# Patient Record
Sex: FEMALE | Race: OTHER | HISPANIC OR LATINO
[De-identification: names, ages, dates, MRNs, and addresses within clinical notes are randomized per-mention and may not be internally consistent; named-entity substitution may affect disease eponyms.]

---

## 2019-03-12 ENCOUNTER — APPOINTMENT (OUTPATIENT)
Dept: NEUROSURGERY | Facility: CLINIC | Age: 74
End: 2019-03-12

## 2019-04-22 ENCOUNTER — APPOINTMENT (OUTPATIENT)
Dept: MRI IMAGING | Facility: IMAGING CENTER | Age: 74
End: 2019-04-22

## 2019-04-23 ENCOUNTER — APPOINTMENT (OUTPATIENT)
Dept: NEUROSURGERY | Facility: CLINIC | Age: 74
End: 2019-04-23

## 2019-07-17 ENCOUNTER — OUTPATIENT (OUTPATIENT)
Dept: OUTPATIENT SERVICES | Facility: HOSPITAL | Age: 74
LOS: 1 days | End: 2019-07-17
Payer: MEDICARE

## 2019-07-17 ENCOUNTER — APPOINTMENT (OUTPATIENT)
Dept: MRI IMAGING | Facility: HOSPITAL | Age: 74
End: 2019-07-17

## 2019-07-17 DIAGNOSIS — G93.9 DISORDER OF BRAIN, UNSPECIFIED: ICD-10-CM

## 2019-07-17 DIAGNOSIS — D33.2 BENIGN NEOPLASM OF BRAIN, UNSPECIFIED: ICD-10-CM

## 2019-07-17 PROCEDURE — 70553 MRI BRAIN STEM W/O & W/DYE: CPT

## 2019-07-17 PROCEDURE — 70553 MRI BRAIN STEM W/O & W/DYE: CPT | Mod: 26

## 2019-07-17 PROCEDURE — A9585: CPT

## 2019-07-23 ENCOUNTER — APPOINTMENT (OUTPATIENT)
Dept: NEUROSURGERY | Facility: CLINIC | Age: 74
End: 2019-07-23
Payer: MEDICARE

## 2019-07-23 VITALS
OXYGEN SATURATION: 97 % | HEIGHT: 61 IN | SYSTOLIC BLOOD PRESSURE: 129 MMHG | WEIGHT: 125 LBS | DIASTOLIC BLOOD PRESSURE: 64 MMHG | HEART RATE: 80 BPM | RESPIRATION RATE: 16 BRPM | BODY MASS INDEX: 23.6 KG/M2 | TEMPERATURE: 98.1 F

## 2019-07-23 PROCEDURE — 99215 OFFICE O/P EST HI 40 MIN: CPT

## 2019-07-23 NOTE — PHYSICAL EXAM
[General Appearance - Alert] : alert [General Appearance - Well Developed] : well developed [General Appearance - In No Acute Distress] : in no acute distress [General Appearance - Well Nourished] : well nourished [General Appearance - Well-Appearing] : healthy appearing [Affect] : the affect was normal [Impaired Insight] : insight and judgment were intact [Oriented To Time, Place, And Person] : oriented to person, place, and time [Memory Recent] : recent memory was not impaired [Mood] : the mood was normal [Time] : oriented to time [Person] : oriented to person [Place] : oriented to place [Cranial Nerves Trigeminal (V)] : facial sensation intact symmetrically [Cranial Nerves Oculomotor (III)] : extraocular motion intact [Cranial Nerves Optic (II)] : visual acuity intact bilaterally,  pupils equal round and reactive to light [Cranial Nerves Facial (VII)] : face symmetrical [Cranial Nerves Vestibulocochlear (VIII)] : hearing was intact bilaterally [Cranial Nerves Glossopharyngeal (IX)] : tongue and palate midline [Cranial Nerves Accessory (XI - Cranial And Spinal)] : head turning and shoulder shrug symmetric [Cranial Nerves Hypoglossal (XII)] : there was no tongue deviation with protrusion [Motor Handedness Right-Handed] : the patient is right hand dominant [Motor Strength] : muscle strength was normal in all four extremities [Sclera] : the sclera and conjunctiva were normal [Extraocular Movements] : extraocular movements were intact [Outer Ear] : the ears and nose were normal in appearance [Hearing Threshold Finger Rub Not [place]] : hearing was normal [Oropharynx] : the oropharynx was normal [Respiration, Rhythm And Depth] : normal respiratory rhythm and effort [Neck Appearance] : the appearance of the neck was normal [Auscultation Breath Sounds / Voice Sounds] : lungs were clear to auscultation bilaterally [Exaggerated Use Of Accessory Muscles For Inspiration] : no accessory muscle use [Apical Impulse] : the apical impulse was normal [Heart Sounds] : normal S1 and S2 [Heart Rate And Rhythm] : heart rate was normal and rhythm regular [Involuntary Movements] : no involuntary movements were seen [Abnormal Walk] : normal gait [] : no rash [Skin Color & Pigmentation] : normal skin color and pigmentation [Motor Tone] : muscle strength and tone were normal [2+] : Ankle jerk left 2+ [Optic Disc Abnormality] : the optic disc were normal in size and color [FreeTextEntry1] : Left pupil is larger 4mm with Right pupil 3 mm.  Partial ptosis left eye. Mild diplopia on left lateral gaze with left eye. All findings seem unchanged form baseline. Vision 20/30 OD and 20/25 -3 OS,

## 2019-07-23 NOTE — DATA REVIEWED
[de-identified] : EXAM: MR BRAIN WAW IC \par \par \par PROCEDURE DATE: 07/17/2019 \par \par \par \par \par INTERPRETATION: CLINICAL INFORMATION: Left eye twitching and cramping. 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 \par \par TECHNIQUE: MRI of the brain was performed with and without contrast. \par Sagittal and axial T1, axial T2, axial T2 FLAIR, SWI, DWI, ADC map, axial, \par sagittal, and coronal T1 postcontrast images were obtained. \par \par 5.5 cc of Gadavist was injected, with 2.0 cc discarded. \par \par COMPARISON: CT brain 12/4/2013. \par \par FINDINGS: \par \par There is a 3.0 x 2.9 x 2.4 cm (AP by TR by CC) avidly enhancing, \par extra-axial, dural based mass in the left anterior middle cranial fossa, \par which invades into the left cavernous sinus. There is encasement of the left \par cavernous internal carotid artery, with marked narrowing of the vascular \par flow-voids. There is mass effect on the anterior medial temporal lobe with \par no vasogenic edema on the T2/FLAIR sequences. The mass extends to the left \par lateral aspect of the sella, and also extends into the suprasellar cistern. \par There is superior and medial deviation of the left optic nerve, optic \par chiasm, and left optic tract from mass effect. The mass abuts and may \par involve the left optic canal. \par \par There is mild prominence of ventricles and sulci compatible with age \par appropriate cerebral volume loss. There is no diffusion abnormality to \par suggest acute or subacute infarction. 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 A focus of blooming artifact in the left frontal lobe may \par be secondary to a calcification, cavernoma, or hemosiderin deposition from \par prior hemorrhage. Major flow-voids at the base of the brain follow expected \par course and contour. \par \par The calvarium is intact. The visualized intraorbital compartments, paranasal \par sinuses and tympanomastoid cavities appear free of acute disease. 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 \par \par \par EXAM: MR BRAIN WAW IC \par \par \par PROCEDURE DATE: 07/17/2019 \par \par \par \par \par INTERPRETATION: CLINICAL INFORMATION: Left eye twitching and cramping. 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 \par \par TECHNIQUE: MRI of the brain was performed with and without contrast. \par Sagittal and axial T1, axial T2, axial T2 FLAIR, SWI, DWI, ADC map, axial, \par sagittal, and coronal T1 postcontrast images were obtained. \par \par 5.5 cc of Gadavist was injected, with 2.0 cc discarded. \par \par COMPARISON: CT brain 12/4/2013. \par \par FINDINGS: \par \par There is a 3.0 x 2.9 x 2.4 cm (AP by TR by CC) avidly enhancing, \par extra-axial, dural based mass in the left anterior middle cranial fossa, \par which invades into the left cavernous sinus. There is encasement of the left \par cavernous internal carotid artery, with marked narrowing of the vascular \par flow-voids. There is mass effect on the anterior medial temporal lobe with \par no vasogenic edema on the T2/FLAIR sequences. The mass extends to the left \par lateral aspect of the sella, and also extends into the suprasellar cistern. \par There is superior and medial deviation of the left optic nerve, optic \par chiasm, and left optic tract from mass effect. The mass abuts and may \par involve the left optic canal. \par \par There is mild prominence of ventricles and sulci compatible with age \par appropriate cerebral volume loss. There is no diffusion abnormality to \par suggest acute or subacute infarction. 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 A focus of blooming artifact in the left frontal lobe may \par be secondary to a calcification, cavernoma, or hemosiderin deposition from \par prior hemorrhage. Major flow-voids at the base of the brain follow expected \par course and contour. \par \par The calvarium is intact. The visualized intraorbital compartments, paranasal \par sinuses and tympanomastoid cavities appear free of acute disease. 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 \par \par \par \par EXAM: MR BRAIN WAW IC \par \par \par PROCEDURE DATE: 07/17/2019 \par \par \par \par \par INTERPRETATION: CLINICAL INFORMATION: Left eye twitching and cramping. 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 \par \par TECHNIQUE: MRI of the brain was performed with and without contrast. \par Sagittal and axial T1, axial T2, axial T2 FLAIR, SWI, DWI, ADC map, axial, \par sagittal, and coronal T1 postcontrast images were obtained. \par \par 5.5 cc of Gadavist was injected, with 2.0 cc discarded. \par \par COMPARISON: CT brain 12/4/2013. \par \par FINDINGS: \par \par There is a 3.0 x 2.9 x 2.4 cm (AP by TR by CC) avidly enhancing, \par extra-axial, dural based mass in the left anterior middle cranial fossa, \par which invades into the left cavernous sinus. There is encasement of the left \par cavernous internal carotid artery, with marked narrowing of the vascular \par flow-voids. There is mass effect on the anterior medial temporal lobe with \par no vasogenic edema on the T2/FLAIR sequences. The mass extends to the left \par lateral aspect of the sella, and also extends into the suprasellar cistern. \par There is superior and medial deviation of the left optic nerve, optic \par chiasm, and left optic tract from mass effect. The mass abuts and may \par involve the left optic canal. \par \par There is mild prominence of ventricles and sulci compatible with age \par appropriate cerebral volume loss. There is no diffusion abnormality to \par suggest acute or subacute infarction. 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 A focus of blooming artifact in the left frontal lobe may \par be secondary to a calcification, cavernoma, or hemosiderin deposition from \par prior hemorrhage. Major flow-voids at the base of the brain follow expected \par course and contour. \par \par The calvarium is intact. The visualized intraorbital compartments, paranasal \par sinuses and tympanomastoid cavities appear free of acute disease. 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 \par \par \par \par \par \par \par \par ALLI WYNN M.D., RADIOLOGY RESIDENT \par This document has been electronically signed. \par SARAH SPICER M.D., ATTENDING RADIOLOGIST \par This document has been electronically signed. Jul 17 2019 4:22PM \par \par \par \par \par \par \par    \par  \par  \par \par \par Bookmarks \par   \par    \par \par  \par \par  \par \par  \par \par \par 1\par    \par \par \par 2\par    \par \par \par 3\par    \par \par

## 2019-07-23 NOTE — ASSESSMENT
[FreeTextEntry1] : IMPRESSION:\par Left medial sphenoid wing and cavernous sinus meningioma - being followed\par Exam seems unchanged with mild left ptosis and slight limitation of left eye abduction and upward gaze\par Left pupil slightly larger than right - as before\par \par \par PLAN:\par 1. F/U with neuro ophthalmologist Dr. Bella, or Dr. Smith.\par 2. Called Dr. Jiménez at [facility] to send me the previous MRIs dated  5/25/17 and 8/22/16 for comparison.\par 3. Will follow up with patient after Neuroradiologists have compared the studies.\par \par Parish Jackson MD, FACS, FAANS\par Professor and \par Department of Neurosurgery\par [facility]\par [address] [place] [address], [address] [place] [address]\[place] [address]\par [phone] Clinical\par [phone] Academic\par \par

## 2019-07-23 NOTE — HISTORY OF PRESENT ILLNESS
[FreeTextEntry1] : Zahraa Worthington is a pleasant right handed 73 year old lady from [place] who presented in 2016 for evaluation of an intracranial mass that was discovered during an eye exam bye her ophthalmologist Dr. Zaheer Bella.  She presented with chronic 3 year history of left eye ptosis and decreased vision with sensation of foreign body at times in her left eye. Symptoms occurred 2-3 times per month. Her ophthalmologist ordered a CT and then an MRI, which demonstrated a 3 cm mass involving the left cavernous sinus - likely a medial sphenoid wing and cavernous sinus meningioma.   Today she presents for a routine follow-up and on questioning reports occasional double vision in left eye when she looks to the left and upwards.  When looking to the right she does not experience the double vision.\par \par She gets regular headaches 4/10, not associated with other symptoms and  controlled with Tylenol prn.  She denies any other untoward symptoms.  Denies any seizures, recent falls or trauma, vertigo, nausea, vomiting, dysmetria or gait changes, or other sensorimotor deficits. \par \par Patient’s past medical history includes severe left facial trauma related to a fall down concrete basement stairs in 1997 and carbon monoxide poisoning in 2013 with residual deficits.

## 2019-08-20 ENCOUNTER — RX RENEWAL (OUTPATIENT)
Age: 74
End: 2019-08-20

## 2019-08-26 ENCOUNTER — NON-APPOINTMENT (OUTPATIENT)
Age: 74
End: 2019-08-26

## 2019-08-26 ENCOUNTER — APPOINTMENT (OUTPATIENT)
Dept: INTERNAL MEDICINE | Facility: CLINIC | Age: 74
End: 2019-08-26
Payer: MEDICARE

## 2019-08-26 VITALS
HEIGHT: 61 IN | SYSTOLIC BLOOD PRESSURE: 120 MMHG | BODY MASS INDEX: 21.9 KG/M2 | OXYGEN SATURATION: 98 % | WEIGHT: 116 LBS | HEART RATE: 69 BPM | TEMPERATURE: 98.4 F | DIASTOLIC BLOOD PRESSURE: 80 MMHG

## 2019-08-26 DIAGNOSIS — R92.2 INCONCLUSIVE MAMMOGRAM: ICD-10-CM

## 2019-08-26 DIAGNOSIS — R53.83 OTHER FATIGUE: ICD-10-CM

## 2019-08-26 DIAGNOSIS — R35.0 FREQUENCY OF MICTURITION: ICD-10-CM

## 2019-08-26 LAB
BILIRUB UR QL STRIP: NORMAL
CLARITY UR: CLEAR
COLLECTION METHOD: NORMAL
GLUCOSE UR-MCNC: NORMAL
HCG UR QL: 0.2 EU/DL
HGB UR QL STRIP.AUTO: NORMAL
KETONES UR-MCNC: NORMAL
LEUKOCYTE ESTERASE UR QL STRIP: ABNORMAL
NITRITE UR QL STRIP: NORMAL
PH UR STRIP: 5
PROT UR STRIP-MCNC: NORMAL
SP GR UR STRIP: 1

## 2019-08-26 PROCEDURE — 81003 URINALYSIS AUTO W/O SCOPE: CPT | Mod: QW

## 2019-08-26 PROCEDURE — G0444 DEPRESSION SCREEN ANNUAL: CPT | Mod: 59

## 2019-08-26 PROCEDURE — 36415 COLL VENOUS BLD VENIPUNCTURE: CPT

## 2019-08-26 PROCEDURE — G0439: CPT

## 2019-08-26 NOTE — HEALTH RISK ASSESSMENT
[Very Good] : ~his/her~  mood as very good [] : Yes [6-10] : 6-10 [Yes] : Yes [2 - 4 times a month (2 pts)] : 2-4 times a month (2 points) [Never (0 pts)] : Never (0 points) [1 or 2 (0 pts)] : 1 or 2 (0 points) [No] : In the past 12 months have you used drugs other than those required for medical reasons? No [No falls in past year] : Patient reported no falls in the past year [0] : 2) Feeling down, depressed, or hopeless: Not at all (0) [Patient reported mammogram was normal] : Patient reported mammogram was normal [Patient reported bone density results were abnormal] : Patient reported bone density results were abnormal [Patient reported PAP Smear was normal] : Patient reported PAP Smear was normal [HIV Test offered] : HIV Test offered [Patient reported colonoscopy was normal] : Patient reported colonoscopy was normal [Hepatitis C test offered] : Hepatitis C test offered [Audit-CScore] : 2 [de-identified] : reg [de-identified] : working in [employer] [MammogramDate] : 8/27/19 [PapSmearDate] : 8/7/2017 [BoneDensityDate] : 8/7.2018 [BoneDensityComments] : osteoporosis [ColonoscopyDate] : 2015

## 2019-08-27 LAB
25(OH)D3 SERPL-MCNC: 31.8 NG/ML
ALBUMIN SERPL ELPH-MCNC: 4.4 G/DL
ALP BLD-CCNC: 141 U/L
ALT SERPL-CCNC: 10 U/L
ANION GAP SERPL CALC-SCNC: 11 MMOL/L
AST SERPL-CCNC: 22 U/L
BASOPHILS # BLD AUTO: 0.06 K/UL
BASOPHILS NFR BLD AUTO: 0.7 %
BILIRUB SERPL-MCNC: 0.2 MG/DL
BUN SERPL-MCNC: 10 MG/DL
CALCIUM SERPL-MCNC: 9.5 MG/DL
CHLORIDE SERPL-SCNC: 105 MMOL/L
CHOLEST SERPL-MCNC: 198 MG/DL
CHOLEST/HDLC SERPL: 3.7 RATIO
CO2 SERPL-SCNC: 25 MMOL/L
CREAT SERPL-MCNC: 0.56 MG/DL
EOSINOPHIL # BLD AUTO: 0.19 K/UL
EOSINOPHIL NFR BLD AUTO: 2.3 %
ESTIMATED AVERAGE GLUCOSE: 114 MG/DL
GLUCOSE SERPL-MCNC: 91 MG/DL
HBA1C MFR BLD HPLC: 5.6 %
HCT VFR BLD CALC: 41.6 %
HDLC SERPL-MCNC: 53 MG/DL
HGB BLD-MCNC: 12.8 G/DL
IMM GRANULOCYTES NFR BLD AUTO: 0.2 %
LDLC SERPL CALC-MCNC: 122 MG/DL
LYMPHOCYTES # BLD AUTO: 2.46 K/UL
LYMPHOCYTES NFR BLD AUTO: 30 %
MAN DIFF?: NORMAL
MCHC RBC-ENTMCNC: 25.7 PG
MCHC RBC-ENTMCNC: 30.8 GM/DL
MCV RBC AUTO: 83.4 FL
MONOCYTES # BLD AUTO: 0.65 K/UL
MONOCYTES NFR BLD AUTO: 7.9 %
NEUTROPHILS # BLD AUTO: 4.82 K/UL
NEUTROPHILS NFR BLD AUTO: 58.9 %
PLATELET # BLD AUTO: 384 K/UL
POTASSIUM SERPL-SCNC: 4.7 MMOL/L
PROT SERPL-MCNC: 8.1 G/DL
RBC # BLD: 4.99 M/UL
RBC # FLD: 15.5 %
SODIUM SERPL-SCNC: 141 MMOL/L
TRIGL SERPL-MCNC: 117 MG/DL
TSH SERPL-ACNC: 1.52 UIU/ML
VIT B12 SERPL-MCNC: 701 PG/ML
WBC # FLD AUTO: 8.2 K/UL

## 2019-09-24 ENCOUNTER — APPOINTMENT (OUTPATIENT)
Dept: INTERNAL MEDICINE | Facility: CLINIC | Age: 74
End: 2019-09-24

## 2019-10-14 ENCOUNTER — LABORATORY RESULT (OUTPATIENT)
Age: 74
End: 2019-10-14

## 2019-10-14 ENCOUNTER — APPOINTMENT (OUTPATIENT)
Dept: INTERNAL MEDICINE | Facility: CLINIC | Age: 74
End: 2019-10-14
Payer: MEDICARE

## 2019-10-14 PROCEDURE — 36415 COLL VENOUS BLD VENIPUNCTURE: CPT

## 2019-10-16 LAB
ALBUMIN SERPL ELPH-MCNC: 4.2 G/DL
ALP BLD-CCNC: 132 U/L
ALT SERPL-CCNC: 9 U/L
ANION GAP SERPL CALC-SCNC: 11 MMOL/L
AST SERPL-CCNC: 18 U/L
BILIRUB SERPL-MCNC: 0.3 MG/DL
BUN SERPL-MCNC: 14 MG/DL
CALCIUM SERPL-MCNC: 9.4 MG/DL
CHLORIDE SERPL-SCNC: 106 MMOL/L
CO2 SERPL-SCNC: 25 MMOL/L
CREAT SERPL-MCNC: 0.6 MG/DL
GLUCOSE SERPL-MCNC: 99 MG/DL
POTASSIUM SERPL-SCNC: 4.5 MMOL/L
PROT SERPL-MCNC: 7.9 G/DL
SODIUM SERPL-SCNC: 142 MMOL/L

## 2020-09-28 ENCOUNTER — APPOINTMENT (OUTPATIENT)
Dept: GASTROENTEROLOGY | Facility: CLINIC | Age: 75
End: 2020-09-28
Payer: MEDICARE

## 2020-09-28 VITALS
DIASTOLIC BLOOD PRESSURE: 64 MMHG | BODY MASS INDEX: 22.84 KG/M2 | TEMPERATURE: 96.9 F | HEIGHT: 61 IN | SYSTOLIC BLOOD PRESSURE: 145 MMHG | HEART RATE: 72 BPM | WEIGHT: 121 LBS

## 2020-09-28 DIAGNOSIS — K59.09 OTHER CONSTIPATION: ICD-10-CM

## 2020-09-28 PROCEDURE — 99214 OFFICE O/P EST MOD 30 MIN: CPT

## 2020-09-28 NOTE — REVIEW OF SYSTEMS
[Joint Pain] : joint pain [Itching] : itching [Negative] : Heme/Lymph [As Noted in HPI] : as noted in HPI [Constipation] : constipation

## 2020-09-28 NOTE — HISTORY OF PRESENT ILLNESS
[Heartburn] : denies heartburn [Nausea] : denies nausea [Vomiting] : denies vomiting [Diarrhea] : denies diarrhea [Constipation] : stable constipation [Yellow Skin Or Eyes (Jaundice)] : denies jaundice [Abdominal Pain] : denies abdominal pain [Abdominal Swelling] : denies abdominal swelling [Rectal Pain] : denies rectal pain [_________] : Performed [unfilled] [de-identified] : Zahraa presents to the office today with her son for follow up.  She was last seen in the office in August 2015 when she underwent a colonoscopy with Dr. Shannon.\par \par She currently feels well from a GI perspective and denies any dysphagia, nausea, abdominal pain, change in bowel habits, GI bleeding, weight loss or family history of GI malignancies.  She has a tendency for constipation but reports that she uses Miralax if she goes more than 3 days without a BM.  On her colonoscopy in 2015, only hemorrhoids were seen.\par  [de-identified] : hemorrhoids

## 2020-10-18 ENCOUNTER — APPOINTMENT (OUTPATIENT)
Dept: INTERNAL MEDICINE | Facility: CLINIC | Age: 75
End: 2020-10-18

## 2020-11-08 ENCOUNTER — NON-APPOINTMENT (OUTPATIENT)
Age: 75
End: 2020-11-08

## 2020-11-08 ENCOUNTER — APPOINTMENT (OUTPATIENT)
Dept: FAMILY MEDICINE | Facility: CLINIC | Age: 75
End: 2020-11-08
Payer: MEDICARE

## 2020-11-08 VITALS
HEIGHT: 61 IN | BODY MASS INDEX: 23.6 KG/M2 | RESPIRATION RATE: 12 BRPM | DIASTOLIC BLOOD PRESSURE: 70 MMHG | TEMPERATURE: 98.5 F | HEART RATE: 74 BPM | SYSTOLIC BLOOD PRESSURE: 140 MMHG | WEIGHT: 125 LBS

## 2020-11-08 DIAGNOSIS — L30.9 DERMATITIS, UNSPECIFIED: ICD-10-CM

## 2020-11-08 DIAGNOSIS — Z23 ENCOUNTER FOR IMMUNIZATION: ICD-10-CM

## 2020-11-08 PROCEDURE — 36415 COLL VENOUS BLD VENIPUNCTURE: CPT

## 2020-11-08 PROCEDURE — G0439: CPT

## 2020-11-08 PROCEDURE — 90662 IIV NO PRSV INCREASED AG IM: CPT

## 2020-11-08 PROCEDURE — G0008: CPT

## 2020-11-08 PROCEDURE — 99072 ADDL SUPL MATRL&STAF TM PHE: CPT

## 2020-11-10 LAB
25(OH)D3 SERPL-MCNC: 29.7 NG/ML
ALBUMIN SERPL ELPH-MCNC: 4.2 G/DL
ALP BLD-CCNC: 125 U/L
ALT SERPL-CCNC: 14 U/L
ANION GAP SERPL CALC-SCNC: 11 MMOL/L
APPEARANCE: CLEAR
AST SERPL-CCNC: 21 U/L
BASOPHILS # BLD AUTO: 0.07 K/UL
BASOPHILS NFR BLD AUTO: 1 %
BILIRUB SERPL-MCNC: 0.3 MG/DL
BILIRUBIN URINE: NEGATIVE
BLOOD URINE: NEGATIVE
BUN SERPL-MCNC: 13 MG/DL
CALCIUM SERPL-MCNC: 8.9 MG/DL
CHLORIDE SERPL-SCNC: 107 MMOL/L
CHOLEST SERPL-MCNC: 171 MG/DL
CO2 SERPL-SCNC: 23 MMOL/L
COLOR: COLORLESS
CREAT SERPL-MCNC: 0.53 MG/DL
EOSINOPHIL # BLD AUTO: 0.34 K/UL
EOSINOPHIL NFR BLD AUTO: 4.9 %
ESTIMATED AVERAGE GLUCOSE: 120 MG/DL
GLUCOSE QUALITATIVE U: NEGATIVE
GLUCOSE SERPL-MCNC: 96 MG/DL
HBA1C MFR BLD HPLC: 5.8 %
HCT VFR BLD CALC: 40.2 %
HDLC SERPL-MCNC: 51 MG/DL
HGB BLD-MCNC: 12.5 G/DL
IMM GRANULOCYTES NFR BLD AUTO: 0.1 %
KETONES URINE: NEGATIVE
LDLC SERPL CALC-MCNC: 102 MG/DL
LEUKOCYTE ESTERASE URINE: NEGATIVE
LYMPHOCYTES # BLD AUTO: 2.08 K/UL
LYMPHOCYTES NFR BLD AUTO: 30.2 %
MAN DIFF?: NORMAL
MCHC RBC-ENTMCNC: 25.9 PG
MCHC RBC-ENTMCNC: 31.1 GM/DL
MCV RBC AUTO: 83.4 FL
MONOCYTES # BLD AUTO: 0.69 K/UL
MONOCYTES NFR BLD AUTO: 10 %
NEUTROPHILS # BLD AUTO: 3.7 K/UL
NEUTROPHILS NFR BLD AUTO: 53.8 %
NITRITE URINE: NEGATIVE
NONHDLC SERPL-MCNC: 121 MG/DL
PH URINE: 7
PLATELET # BLD AUTO: 354 K/UL
POTASSIUM SERPL-SCNC: 4.1 MMOL/L
PROT SERPL-MCNC: 7.4 G/DL
PROTEIN URINE: NEGATIVE
RBC # BLD: 4.82 M/UL
RBC # FLD: 15.2 %
SARS-COV-2 IGG SERPL IA-ACNC: 0.09 INDEX
SARS-COV-2 IGG SERPL QL IA: NEGATIVE
SODIUM SERPL-SCNC: 141 MMOL/L
SPECIFIC GRAVITY URINE: 1.01
TRIGL SERPL-MCNC: 92 MG/DL
TSH SERPL-ACNC: 1.36 UIU/ML
UROBILINOGEN URINE: NORMAL
VIT B12 SERPL-MCNC: 607 PG/ML
WBC # FLD AUTO: 6.89 K/UL

## 2020-11-16 ENCOUNTER — APPOINTMENT (OUTPATIENT)
Dept: VASCULAR SURGERY | Facility: CLINIC | Age: 75
End: 2020-11-16
Payer: MEDICARE

## 2020-11-16 VITALS
SYSTOLIC BLOOD PRESSURE: 125 MMHG | BODY MASS INDEX: 23.6 KG/M2 | HEIGHT: 61 IN | TEMPERATURE: 97.7 F | HEART RATE: 68 BPM | DIASTOLIC BLOOD PRESSURE: 72 MMHG | WEIGHT: 125 LBS

## 2020-11-16 PROCEDURE — 93970 EXTREMITY STUDY: CPT

## 2020-11-16 PROCEDURE — 99072 ADDL SUPL MATRL&STAF TM PHE: CPT

## 2020-11-16 PROCEDURE — 99203 OFFICE O/P NEW LOW 30 MIN: CPT

## 2020-11-16 NOTE — PHYSICAL EXAM
[de-identified] : On physical examination the patient is in no acute distress. The lungs are clear to auscultation and the heart has a regular rate and rhythm. The abdomen is benign. Bilateral femoral and pulses are palpable. Hyperpigmentation changes of both ankles and lateral legs and anterior left shin. Varicose veins of both legs. No wounds present. \par \par A lower extremity venous duplex was obtained in the office today and showed:\par -No evidence of DVT/SVT in either extremity\par -Right: reflux in the great saphenous vein and calf tributaries\par -Left: reflux in calf tributaries\par -Both SSV appear closed\par

## 2020-11-16 NOTE — ASSESSMENT
[FreeTextEntry1] : In summary, Mrs. Worthington presents with lower extremity superficial venous insufficiency. I have provided her a new prescription for knee-high, 20-30mmg Hg compression stockings and instructed her to wear these during the day and remove at night. She should exercise regularly and keep her legs elevated whenever possible. We will schedule her for right GSV radiofrequency ablation and bilateral stab phlebectomy at her convenience. Her skin hyperpigmentation is likely the result of prior sclerotherapy and is unlikely to resolve.\par \par Thank you for allowing me to participate in the care of this nice lady. Please do not hesitate to contact me with any questions. \par   \par

## 2020-11-16 NOTE — HISTORY OF PRESENT ILLNESS
[FreeTextEntry1] : I have just had the pleasure of seeing Mrs. Zahraa Worthington in consultation for lower extremity venous insufficiency.\par \par Mrs. Worthington is a pleasant 75-year-old lady who presents with a long history of lower extremity varicose veins. She underwent ultrasound-guided sclerotherapy at [facility] 1-2 years ago and has had skin hyperpigmentation since the procedure. She works as a hairdresser’s assistant and spends her days standing for long periods of time. She has been wearing compression stockings since her vein procedure. Her legs feel fatigued as the day progresses and her veins are pruritic. She denies any lower extremity edema or ulcers. She denies any history of DVT/SVT/PE or other thromboembolic disease. She denies any history of lower extremity claudication, rest pain, or tissue loss. \par \par She denies any history of CAD, MI, DM, CRI, CVA, or TIA.\par \par Her medical history is otherwise significant for meningioma and breast cyst\par \par Medications: Vitamins\par \par Allergies: PCN\par \par Social history: Non-smoker\par \par FH: NC\par

## 2020-11-23 ENCOUNTER — APPOINTMENT (OUTPATIENT)
Dept: RADIOLOGY | Facility: CLINIC | Age: 75
End: 2020-11-23
Payer: MEDICARE

## 2020-11-23 ENCOUNTER — OUTPATIENT (OUTPATIENT)
Dept: OUTPATIENT SERVICES | Facility: HOSPITAL | Age: 75
LOS: 1 days | End: 2020-11-23
Payer: MEDICARE

## 2020-11-23 ENCOUNTER — RESULT REVIEW (OUTPATIENT)
Age: 75
End: 2020-11-23

## 2020-11-23 DIAGNOSIS — D33.2 BENIGN NEOPLASM OF BRAIN, UNSPECIFIED: ICD-10-CM

## 2020-11-23 PROCEDURE — 77080 DXA BONE DENSITY AXIAL: CPT

## 2020-11-23 PROCEDURE — 77080 DXA BONE DENSITY AXIAL: CPT | Mod: 26

## 2021-01-04 ENCOUNTER — APPOINTMENT (OUTPATIENT)
Dept: VASCULAR SURGERY | Facility: CLINIC | Age: 76
End: 2021-01-04

## 2021-01-04 DIAGNOSIS — I83.93 ASYMPTOMATIC VARICOSE VEINS OF BILATERAL LOWER EXTREMITIES: ICD-10-CM

## 2021-01-05 LAB — SARS-COV-2 N GENE NPH QL NAA+PROBE: NOT DETECTED

## 2021-01-07 ENCOUNTER — APPOINTMENT (OUTPATIENT)
Dept: VASCULAR SURGERY | Facility: CLINIC | Age: 76
End: 2021-01-07
Payer: MEDICARE

## 2021-01-07 PROCEDURE — 99072 ADDL SUPL MATRL&STAF TM PHE: CPT

## 2021-01-07 PROCEDURE — 37765 STAB PHLEB VEINS XTR 10-20: CPT | Mod: LT

## 2021-01-07 NOTE — PROCEDURE
[FreeTextEntry1] : left stab phlebectomy [FreeTextEntry3] : Procedural safety checklist and time out completed:\par Confirmed patient identification (Patient Name, , and/or medical record number including when possible affirmation by patient or parent/family/other.\par Confirmed procedure with the patient. Consent present, accurate and signed. \par Confirmed special equipment and supplies are present.\par Sterility confirmed. Position verified. \par Site/ side is marked and visible and confirmed. \par Procedure confirmed by consent. Accurate consent including side and site.\par Review of medical records noting correct procedure including site and side.\par MD/PA verifies presence and review of imaging studies and or written report of imaging studies.\par Specify equipment are available for the planned procedure.\par MD/PA has marked the patient's procedural site and side.\par Agreement on the procedure to be performed\par Time out completed.\par All of the above has been confirmed by the team.\par All patient-specific concerns have been addressed. \par \par Indication: Left lower extremity varicose veins with inflammation, leg pain, leg swelling, and leg cramping.  COVID PCR negative.\par \par Procedure: Stab phlebectomy left lower extremity\par \par  Ms. KIM CONNELLY is a 75 year old F with a history of symptomatic left lower extremity varicose veins. A trial of compression stockings, exercise, elevation, and pain medication was attempted without relief and definitive treatment with microphlebectomy was offered. \par \par I have discussed the risks of the procedure at length with the patient. The risks discussed were inclusive of but not limited to infection, irritation at the site of infiltration of local anesthesia, and also rare risk of deep venous thrombosis and pulmonary emboli. The patient agrees to proceed with the procedure. \par The patient was escorted into the procedure room, the varicose veins for treatment were marked out and the patient placed on the examination table. The entire limb was prepped and draped in sterile fashion and a  time out was called. \par \par Local anesthesia using 15 cc 1% lidocaine was infiltrated using a 25 gauge needle over the previously marked prominent varicose vein sites.\par Multiple small stab incisions, each less than 1 cm in length was made at the noted sites. With the help of a vein hook, the vein was fished out at each of these sites, rolled over a narrow-tipped mosquito clamp and removed. Hemostasis was secured with leg elevation and application of manual pressure. After assuring hemostasis, a sterile 4x4 was placed on the access sites and an ACE compression wrap was applied. Estimated blood loss: minimal. Patient tolerated procedure well. Patient was given post-procedure instructions and follow up appointment was scheduled. \par \par SIDE -  LEFT	\par SITE - CALF / THIGH\par LOCATION - MEDIAL / LATERAL  / POSTERIOR	\par Total Stab Incisions 12\par \par

## 2021-02-02 ENCOUNTER — APPOINTMENT (OUTPATIENT)
Dept: VASCULAR SURGERY | Facility: CLINIC | Age: 76
End: 2021-02-02

## 2021-02-03 LAB — SARS-COV-2 N GENE NPH QL NAA+PROBE: NOT DETECTED

## 2021-02-04 ENCOUNTER — APPOINTMENT (OUTPATIENT)
Dept: VASCULAR SURGERY | Facility: CLINIC | Age: 76
End: 2021-02-04
Payer: MEDICARE

## 2021-02-04 PROCEDURE — 99072 ADDL SUPL MATRL&STAF TM PHE: CPT

## 2021-02-04 PROCEDURE — 36475 ENDOVENOUS RF 1ST VEIN: CPT | Mod: RT

## 2021-02-04 RX ORDER — LIDOCAINE HYDROCHLORIDE 10 MG/ML
1 INJECTION, SOLUTION INFILTRATION; PERINEURAL
Qty: 1 | Refills: 0 | Status: COMPLETED | COMMUNITY
Start: 2021-01-04 | End: 2021-02-04

## 2021-02-04 RX ORDER — SODIUM CHLORIDE 9 G/ML
0.9 INJECTION, SOLUTION INTRAVENOUS
Qty: 1 | Refills: 0 | Status: COMPLETED | COMMUNITY
Start: 2021-01-29 | End: 2021-02-04

## 2021-02-04 RX ORDER — SODIUM BICARBONATE 84 MG/ML
8.4 INJECTION, SOLUTION INTRAVENOUS
Qty: 1 | Refills: 0 | Status: COMPLETED | COMMUNITY
Start: 2021-01-04 | End: 2021-02-04

## 2021-02-04 RX ORDER — SODIUM CHLORIDE 9 G/ML
0.9 INJECTION, SOLUTION INTRAVENOUS
Qty: 1 | Refills: 0 | Status: COMPLETED | COMMUNITY
Start: 2021-01-04 | End: 2021-02-04

## 2021-02-04 RX ORDER — SODIUM BICARBONATE 84 MG/ML
8.4 INJECTION, SOLUTION INTRAVENOUS
Qty: 1 | Refills: 0 | Status: COMPLETED | COMMUNITY
Start: 2021-01-29 | End: 2021-02-04

## 2021-02-04 RX ORDER — LIDOCAINE HYDROCHLORIDE 10 MG/ML
1 INJECTION, SOLUTION INFILTRATION; PERINEURAL
Qty: 1 | Refills: 0 | Status: COMPLETED | COMMUNITY
Start: 2021-01-29 | End: 2021-02-04

## 2021-02-04 NOTE — PROCEDURE
[FreeTextEntry1] : right GSV RFA [FreeTextEntry3] : Procedural safety checklist and time out completed:\par Confirmed patient identification (Patient Name, , and/or medical record number including when possible affirmation by patient or parent/family/other).\par Confirmed procedure with the patient. Consent present, accurate and signed. \par Confirmed special equipment and supplies are present.\par Sterility confirmed. Position verified. \par Site/ side is marked and visible and confirmed. \par Procedure confirmed by consent. Accurate consent including side and site.\par Review of medical records, including venous ultrasound, noting correct procedure including site and side.\par MD/PA verifies presence and review of imaging studies and or written report of imaging studies.\par Agreement on the procedure to be performed\par Time out completed.\par All of the above has been confirmed by the team.\par All patient-specific concerns have been addressed. \par \par Indication: Right lower extremity varicose veins with inflammation, leg pain, leg swelling, and leg cramping.  Venous insufficiency/ reflux.\par \par Procedure: radiofrequency ablation of the right saphenous vein. \par 	\par Ms. KIM CONNELLY is a 75 year old F with a history of right lower extremity varicose veins previously seen in the office.  Ultrasound examination demonstrated venous insufficiency. A trial of compression stockings, exercise, elevation, and pain medication was attempted without relief and definitive treatment with radiofrequency ablation was offered. \par \par The patient has come for radiofrequency ablation treatment of the right great saphenous vein. Pre-procedure COVID PCR test was negative.\par I have discussed the risks of the procedure at length with the patient. The risks discussed were inclusive of but not limited to infection, irritation at the site of infiltration of local anesthesia, and also rare risk of deep venous thrombosis and pulmonary emboli. The patient agrees to proceed with the procedure. \par The patient was escorted into the procedure room and a time out called.\par The entire limb was prepped and draped in sterile fashion. The RF fiber was placed on the sterile field and connected by a sterile cable. Actuation, temperature, and impedance testing were performed to ensure that all components were connected and operating properly. \par The patient was placed on the procedure table and local anesthesia was instilled in the skin overlying the access site. Under ultrasound guidance, the vein was punctured with a micropuncture needle, using the anterior wall technique. A guide wire was now introduced through the needle, and the needle was then exchanged over the guide wire for a 7F sheath. The guide wire was removed and the RF probe was then placed into the right great saphenous vein through the sheath and position confirmed using ultrasound guidance. After the RF probe position was verified by ultrasound, tumescent anesthesia consisting of normal saline, 1% lidocaine with 8.4% sodium bicarbonate was infiltrated, under ultrasound guidance, precisely into the perivenous compartment along the entire length of the vein until a “halo” of fluid was noted around the vein. After RF probe position was again confirmed with ultrasound imaging, RF energy was applied. The probe was gradually and carefully withdrawn at a rate of 6.5cm/20seconds. \par \par 6 cycles of RF performed using the 7 cm probe\par Total treatment time was 120 seconds.\par The total volume injected was 300  cc\par Treatment length was 33 cm and \par The probe is 3.7 cm from the SFJ.\par \par Estimated Blood Loss: minimal\par Repeat ultrasound of the treated vein was performed confirming successful treatment. The catheter and sheath were withdrawn and hemostasis established with direct pressure. After assuring hemostasis, a sterile 4x4 was placed on the access site and an ACE compression wrap was applied. Patient tolerated procedure well. Patient was given post-procedure instructions and follow up appointment was scheduled.\par \par \par

## 2021-02-08 ENCOUNTER — APPOINTMENT (OUTPATIENT)
Dept: VASCULAR SURGERY | Facility: CLINIC | Age: 76
End: 2021-02-08
Payer: MEDICARE

## 2021-02-08 PROCEDURE — 93923 UPR/LXTR ART STDY 3+ LVLS: CPT

## 2021-02-08 PROCEDURE — 99072 ADDL SUPL MATRL&STAF TM PHE: CPT

## 2021-03-01 ENCOUNTER — APPOINTMENT (OUTPATIENT)
Dept: VASCULAR SURGERY | Facility: CLINIC | Age: 76
End: 2021-03-01

## 2021-03-01 ENCOUNTER — LABORATORY RESULT (OUTPATIENT)
Age: 76
End: 2021-03-01

## 2021-03-01 DIAGNOSIS — I83.892 VARICOSE VEINS OF LEFT LOWER EXTREMITY WITH OTHER COMPLICATIONS: ICD-10-CM

## 2021-03-04 ENCOUNTER — APPOINTMENT (OUTPATIENT)
Dept: VASCULAR SURGERY | Facility: CLINIC | Age: 76
End: 2021-03-04
Payer: MEDICARE

## 2021-03-04 DIAGNOSIS — I83.891 VARICOSE VEINS OF RIGHT LOWER EXTREMITY WITH OTHER COMPLICATIONS: ICD-10-CM

## 2021-03-04 PROCEDURE — 37765 STAB PHLEB VEINS XTR 10-20: CPT | Mod: RT

## 2021-03-04 PROCEDURE — 99072 ADDL SUPL MATRL&STAF TM PHE: CPT

## 2021-03-04 RX ORDER — SODIUM CHLORIDE 9 G/ML
0.9 INJECTION, SOLUTION INTRAVENOUS
Qty: 1 | Refills: 0 | Status: COMPLETED | COMMUNITY
Start: 2021-03-01 | End: 2021-03-04

## 2021-03-04 RX ORDER — SODIUM BICARBONATE 84 MG/ML
8.4 INJECTION, SOLUTION INTRAVENOUS
Qty: 1 | Refills: 0 | Status: COMPLETED | COMMUNITY
Start: 2021-03-01 | End: 2021-03-04

## 2021-03-04 RX ORDER — LIDOCAINE HYDROCHLORIDE 10 MG/ML
1 INJECTION, SOLUTION INFILTRATION; PERINEURAL
Qty: 1 | Refills: 0 | Status: COMPLETED | COMMUNITY
Start: 2021-03-01 | End: 2021-03-04

## 2021-03-04 NOTE — PROCEDURE
[FreeTextEntry1] : right stab phlebectomy [FreeTextEntry3] : Procedural safety checklist and time out completed:\par Confirmed patient identification (Patient Name, , and/or medical record number including when possible affirmation by patient or parent/family/other.\par Confirmed procedure with the patient. Consent present, accurate and signed. \par Confirmed special equipment and supplies are present.\par Sterility confirmed. Position verified. \par Site/ side is marked and visible and confirmed. \par Procedure confirmed by consent. Accurate consent including side and site.\par Review of medical records noting correct procedure including site and side.\par MD/PA verifies presence and review of imaging studies and or written report of imaging studies.\par Specify equipment are available for the planned procedure.\par MD/PA has marked the patient's procedural site and side.\par Agreement on the procedure to be performed\par Time out completed.\par All of the above has been confirmed by the team.\par All patient-specific concerns have been addressed. \par \par Indication:  Right lower extremity varicose veins with inflammation, leg pain, leg swelling, and leg cramping.  COVID PCR negative.\par \par Procedure: Stab phlebectomy right  lower extremity\par \par  Ms. KIM CONNELLY is a 75 year old F with a history of symptomatic right lower extremity varicose veins. A trial of compression stockings, exercise, elevation, and pain medication was attempted without relief and definitive treatment with microphlebectomy was offered. \par \par I have discussed the risks of the procedure at length with the patient. The risks discussed were inclusive of but not limited to infection, irritation at the site of infiltration of local anesthesia, and also rare risk of deep venous thrombosis and pulmonary emboli. The patient agrees to proceed with the procedure. \par The patient was escorted into the procedure room, the varicose veins for treatment were marked out and the patient placed on the examination table. The entire limb was prepped and draped in sterile fashion and a  time out was called. \par \par Local anesthesia using __ cc 1% lidocaine was infiltrated using a 25 gauge needle over the previously marked prominent varicose vein sites.\par Multiple small stab incisions, each less than 1 cm in length was made at the noted sites. With the help of a vein hook, the vein was fished out at each of these sites, rolled over a narrow-tipped mosquito clamp and removed. Hemostasis was secured with leg elevation and application of manual pressure. After assuring hemostasis, a sterile 4x4 was placed on the access sites and an ACE compression wrap was applied. Estimated blood loss: minimal. Patient tolerated procedure well. Patient was given post-procedure instructions and follow up appointment was scheduled. \par \par SIDE - RIGHT \par SITE - CALF \par LOCATION - LATERAL / POSTERIOR	\par Total Stab Incisions 10\par \par

## 2021-04-05 ENCOUNTER — APPOINTMENT (OUTPATIENT)
Dept: VASCULAR SURGERY | Facility: CLINIC | Age: 76
End: 2021-04-05
Payer: MEDICARE

## 2021-04-05 VITALS
TEMPERATURE: 97.5 F | SYSTOLIC BLOOD PRESSURE: 119 MMHG | WEIGHT: 125 LBS | HEIGHT: 61 IN | HEART RATE: 79 BPM | DIASTOLIC BLOOD PRESSURE: 73 MMHG | BODY MASS INDEX: 23.6 KG/M2

## 2021-04-05 PROCEDURE — 99213 OFFICE O/P EST LOW 20 MIN: CPT

## 2021-04-05 PROCEDURE — 99072 ADDL SUPL MATRL&STAF TM PHE: CPT

## 2021-04-05 NOTE — HISTORY OF PRESENT ILLNESS
[FreeTextEntry1] : I have just had the pleasure of seeing Mrs. Zahraa Worthington in follow up for lower extremity venous insufficiency.\par \par Mrs. Worthington is a pleasant 75-year-old lady who presented with a long history of lower extremity varicose veins. She underwent ultrasound-guided sclerotherapy at [facility] 1-2 years ago and has had skin hyperpigmentation since the procedure. She works as a hairdresser’s assistant and spends her days standing for long periods of time. She has been wearing compression stockings since her vein procedure. Her legs feel fatigued as the day progresses and her veins are pruritic. She denies any lower extremity edema or ulcers. She denies any history of DVT/SVT/PE or other thromboembolic disease. She denies any history of lower extremity claudication, rest pain, or tissue loss. \par \par Since her initial consultation, Mrs. Worthington has undergone left leg stab phlebectomy (1/7/21), right GSV RFA (2/4/21) and right leg stab phlebectomy (3/4/21). Her post-RFA duplex showed that the right GSV was closed and there was no evidence of DVT. Her stab incisions have healed well and there is no erythema, induration, fluctuance, or drainage. She is wearing compression stockings as instructed and she is without complaints. \par \par She denies any history of CAD, MI, DM, CRI, CVA, or TIA.\par \par Her medical history is otherwise significant for meningioma and breast cyst\par \par Medications: Vitamins\par \par Allergies: PCN\par \par Social history: Non-smoker\par \par FH: NC\par

## 2021-04-05 NOTE — ASSESSMENT
[FreeTextEntry1] : I instructed Mrs. Worthington to continue to wear compression stockings as instructed. She will follow up as needed.

## 2021-04-05 NOTE — PHYSICAL EXAM
[de-identified] : On physical examination the patient is in no acute distress. The lungs are clear to auscultation and the heart has a regular rate and rhythm. The abdomen is benign. Bilateral femoral and pulses are palpable. Hyperpigmentation changes of both ankles and lateral legs and anterior left shin. Well healed stab incisions of both lower extremities. No edema or wounds present. \par \par

## 2021-12-07 ENCOUNTER — APPOINTMENT (OUTPATIENT)
Dept: FAMILY MEDICINE | Facility: CLINIC | Age: 76
End: 2021-12-07
Payer: MEDICARE

## 2021-12-07 ENCOUNTER — LABORATORY RESULT (OUTPATIENT)
Age: 76
End: 2021-12-07

## 2021-12-07 VITALS
SYSTOLIC BLOOD PRESSURE: 124 MMHG | OXYGEN SATURATION: 98 % | TEMPERATURE: 97.9 F | HEIGHT: 61 IN | DIASTOLIC BLOOD PRESSURE: 70 MMHG | HEART RATE: 81 BPM | WEIGHT: 120 LBS | BODY MASS INDEX: 22.66 KG/M2

## 2021-12-07 DIAGNOSIS — R74.8 ABNORMAL LEVELS OF OTHER SERUM ENZYMES: ICD-10-CM

## 2021-12-07 DIAGNOSIS — L25.9 UNSPECIFIED CONTACT DERMATITIS, UNSPECIFIED CAUSE: ICD-10-CM

## 2021-12-07 PROCEDURE — G0439: CPT

## 2021-12-07 PROCEDURE — G0442 ANNUAL ALCOHOL SCREEN 15 MIN: CPT

## 2021-12-07 PROCEDURE — G0444 DEPRESSION SCREEN ANNUAL: CPT

## 2021-12-07 PROCEDURE — 36415 COLL VENOUS BLD VENIPUNCTURE: CPT

## 2021-12-07 PROCEDURE — 93000 ELECTROCARDIOGRAM COMPLETE: CPT | Mod: 59

## 2021-12-07 NOTE — HISTORY OF PRESENT ILLNESS
[FreeTextEntry1] : annual wellness visit [de-identified] : 75 yo female presents for annual physical. She reports intermittent lower back pain. She had a varicose vein procedure this summer, and reports after the surgery she developed an itchy dark rash on the back of the right knee where the procedure was done, unresolved . She has tried a Lotrimin spray and hydrocortisone cream with no significant relief. \par Has history of osteoporosis,  started Prolia last year, had 2 doses, but became too expensive\par States daughter just moved to [place]. 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 \par

## 2021-12-07 NOTE — PHYSICAL EXAM
[Normal] : normal gait, coordination grossly intact, no focal deficits and deep tendon reflexes were 2+ and symmetric [de-identified] : dark erythematous rash on right popliteal region

## 2021-12-07 NOTE — HEALTH RISK ASSESSMENT
[Very Good] : ~his/her~  mood as very good [] : Yes [Yes] : Yes [2 - 4 times a month (2 pts)] : 2-4 times a month (2 points) [1 or 2 (0 pts)] : 1 or 2 (0 points) [Never (0 pts)] : Never (0 points) [No] : In the past 12 months have you used drugs other than those required for medical reasons? No [No falls in past year] : Patient reported no falls in the past year [0] : 2) Feeling down, depressed, or hopeless: Not at all (0) [PHQ-2 Negative - No further assessment needed] : PHQ-2 Negative - No further assessment needed [Patient reported mammogram was normal] : Patient reported mammogram was normal [Patient reported PAP Smear was normal] : Patient reported PAP Smear was normal [Patient reported bone density results were abnormal] : Patient reported bone density results were abnormal [Patient reported colonoscopy was normal] : Patient reported colonoscopy was normal [HIV Test offered] : HIV Test offered [Hepatitis C test offered] : Hepatitis C test offered [None] : None [With Family] : lives with family [Employed] : employed [Single] : single [Feels Safe at Home] : Feels safe at home [Fully functional (bathing, dressing, toileting, transferring, walking, feeding)] : Fully functional (bathing, dressing, toileting, transferring, walking, feeding) [Fully functional (using the telephone, shopping, preparing meals, housekeeping, doing laundry, using] : Fully functional and needs no help or supervision to perform IADLs (using the telephone, shopping, preparing meals, housekeeping, doing laundry, using transportation, managing medications and managing finances) [Smoke Detector] : smoke detector [Carbon Monoxide Detector] : carbon monoxide detector [YearQuit] : 1983 [Audit-CScore] : 2 [de-identified] : working and walking in [employer] [de-identified] : reg, veggies, protein, fish, less red meat now [HFW8Chtsi] : 0 [Change in mental status noted] : No change in mental status noted [Language] : denies difficulty with language [Behavior] : denies difficulty with behavior [Reasoning] : denies difficulty with reasoning [Sexually Active] : not sexually active [Reports changes in hearing] : Reports no changes in hearing [Reports changes in vision] : Reports no changes in vision [Reports changes in dental health] : Reports no changes in dental health [MammogramDate] : 10/26/21 [MammogramComments] : BIRADS 2 [PapSmearDate] : 8/7/2017 [BoneDensityDate] : 11/23/2020 [BoneDensityComments] : osteoporosis [ColonoscopyDate] : 2019

## 2021-12-09 LAB
25(OH)D3 SERPL-MCNC: 35.4 NG/ML
ALBUMIN SERPL ELPH-MCNC: 4.3 G/DL
ALP BLD-CCNC: 123 U/L
ALT SERPL-CCNC: 11 U/L
ANION GAP SERPL CALC-SCNC: 10 MMOL/L
APPEARANCE: CLEAR
AST SERPL-CCNC: 20 U/L
BASOPHILS # BLD AUTO: 0.04 K/UL
BASOPHILS NFR BLD AUTO: 0.6 %
BILIRUB SERPL-MCNC: <0.2 MG/DL
BILIRUBIN URINE: NEGATIVE
BLOOD URINE: NEGATIVE
BUN SERPL-MCNC: 10 MG/DL
CALCIUM SERPL-MCNC: 9.4 MG/DL
CHLORIDE SERPL-SCNC: 102 MMOL/L
CHOLEST SERPL-MCNC: 200 MG/DL
CO2 SERPL-SCNC: 26 MMOL/L
COLOR: COLORLESS
CREAT SERPL-MCNC: 0.6 MG/DL
EOSINOPHIL # BLD AUTO: 0.12 K/UL
EOSINOPHIL NFR BLD AUTO: 1.9 %
ESTIMATED AVERAGE GLUCOSE: 120 MG/DL
GLUCOSE QUALITATIVE U: NEGATIVE
GLUCOSE SERPL-MCNC: 93 MG/DL
HBA1C MFR BLD HPLC: 5.8 %
HCT VFR BLD CALC: 40 %
HDLC SERPL-MCNC: 56 MG/DL
HGB BLD-MCNC: 12.8 G/DL
IMM GRANULOCYTES NFR BLD AUTO: 0.2 %
KETONES URINE: NEGATIVE
LDLC SERPL CALC-MCNC: 130 MG/DL
LEUKOCYTE ESTERASE URINE: ABNORMAL
LYMPHOCYTES # BLD AUTO: 1.97 K/UL
LYMPHOCYTES NFR BLD AUTO: 31.8 %
MAN DIFF?: NORMAL
MCHC RBC-ENTMCNC: 26 PG
MCHC RBC-ENTMCNC: 32 GM/DL
MCV RBC AUTO: 81.3 FL
MONOCYTES # BLD AUTO: 0.93 K/UL
MONOCYTES NFR BLD AUTO: 15 %
NEUTROPHILS # BLD AUTO: 3.13 K/UL
NEUTROPHILS NFR BLD AUTO: 50.5 %
NITRITE URINE: NEGATIVE
NONHDLC SERPL-MCNC: 143 MG/DL
PH URINE: 6
PLATELET # BLD AUTO: 330 K/UL
POTASSIUM SERPL-SCNC: 4 MMOL/L
PROT SERPL-MCNC: 7.7 G/DL
PROTEIN URINE: NEGATIVE
RBC # BLD: 4.92 M/UL
RBC # FLD: 15.4 %
SODIUM SERPL-SCNC: 138 MMOL/L
SPECIFIC GRAVITY URINE: 1.01
TRIGL SERPL-MCNC: 67 MG/DL
TSH SERPL-ACNC: 1.35 UIU/ML
UROBILINOGEN URINE: NORMAL
VIT B12 SERPL-MCNC: 565 PG/ML
WBC # FLD AUTO: 6.2 K/UL

## 2021-12-29 ENCOUNTER — EMERGENCY (EMERGENCY)
Facility: HOSPITAL | Age: 76
LOS: 0 days | Discharge: HOME | End: 2021-12-30
Attending: STUDENT IN AN ORGANIZED HEALTH CARE EDUCATION/TRAINING PROGRAM
Payer: MEDICARE

## 2021-12-29 VITALS
SYSTOLIC BLOOD PRESSURE: 139 MMHG | DIASTOLIC BLOOD PRESSURE: 68 MMHG | HEART RATE: 66 BPM | OXYGEN SATURATION: 98 % | TEMPERATURE: 98 F | RESPIRATION RATE: 16 BRPM

## 2021-12-29 VITALS
SYSTOLIC BLOOD PRESSURE: 158 MMHG | TEMPERATURE: 98 F | HEART RATE: 70 BPM | OXYGEN SATURATION: 98 % | WEIGHT: 119.93 LBS | DIASTOLIC BLOOD PRESSURE: 77 MMHG | HEIGHT: 61 IN | RESPIRATION RATE: 16 BRPM
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
## 2021-12-29 DIAGNOSIS — R07.9 CHEST PAIN, UNSPECIFIED: ICD-10-CM

## 2021-12-29 DIAGNOSIS — M54.9 DORSALGIA, UNSPECIFIED: ICD-10-CM

## 2021-12-29 DIAGNOSIS — Z88.0 ALLERGY STATUS TO PENICILLIN: ICD-10-CM

## 2021-12-29 PROCEDURE — 93010 ELECTROCARDIOGRAM REPORT: CPT

## 2021-12-29 PROCEDURE — 71045 X-RAY EXAM CHEST 1 VIEW: CPT | Mod: 26

## 2021-12-29 PROCEDURE — 99285 EMERGENCY DEPT VISIT HI MDM: CPT

## 2021-12-30 LAB
ALBUMIN SERPL ELPH-MCNC: 3.3 G/DL — SIGNIFICANT CHANGE UP (ref 3.3–5)
ALP SERPL-CCNC: 123 U/L — HIGH (ref 40–120)
ALT FLD-CCNC: 19 U/L — SIGNIFICANT CHANGE UP (ref 12–78)
ANION GAP SERPL CALC-SCNC: 5 MMOL/L — SIGNIFICANT CHANGE UP (ref 5–17)
AST SERPL-CCNC: 19 U/L — SIGNIFICANT CHANGE UP (ref 15–37)
BILIRUB SERPL-MCNC: 0.2 MG/DL — SIGNIFICANT CHANGE UP (ref 0.2–1.2)
BUN SERPL-MCNC: 14 MG/DL — SIGNIFICANT CHANGE UP (ref 7–23)
CALCIUM SERPL-MCNC: 8.7 MG/DL — SIGNIFICANT CHANGE UP (ref 8.5–10.1)
CHLORIDE SERPL-SCNC: 110 MMOL/L — HIGH (ref 96–108)
CO2 SERPL-SCNC: 27 MMOL/L — SIGNIFICANT CHANGE UP (ref 22–31)
CREAT SERPL-MCNC: 0.49 MG/DL — LOW (ref 0.5–1.3)
GLUCOSE SERPL-MCNC: 100 MG/DL — HIGH (ref 70–99)
HCT VFR BLD CALC: 37.9 % — SIGNIFICANT CHANGE UP (ref 34.5–45)
HGB BLD-MCNC: 12.3 G/DL — SIGNIFICANT CHANGE UP (ref 11.5–15.5)
MCHC RBC-ENTMCNC: 25.6 PG — LOW (ref 27–34)
MCHC RBC-ENTMCNC: 32.5 GM/DL — SIGNIFICANT CHANGE UP (ref 32–36)
MCV RBC AUTO: 79 FL — LOW (ref 80–100)
NRBC # BLD: 0 /100 WBCS — SIGNIFICANT CHANGE UP (ref 0–0)
PLATELET # BLD AUTO: 322 K/UL — SIGNIFICANT CHANGE UP (ref 150–400)
POTASSIUM SERPL-MCNC: 3.5 MMOL/L — SIGNIFICANT CHANGE UP (ref 3.5–5.3)
POTASSIUM SERPL-SCNC: 3.5 MMOL/L — SIGNIFICANT CHANGE UP (ref 3.5–5.3)
PROT SERPL-MCNC: 8 GM/DL — SIGNIFICANT CHANGE UP (ref 6–8.3)
RBC # BLD: 4.8 M/UL — SIGNIFICANT CHANGE UP (ref 3.8–5.2)
RBC # FLD: 15.3 % — HIGH (ref 10.3–14.5)
SODIUM SERPL-SCNC: 142 MMOL/L — SIGNIFICANT CHANGE UP (ref 135–145)
TROPONIN I, HIGH SENSITIVITY RESULT: 10.5 NG/L — SIGNIFICANT CHANGE UP
WBC # BLD: 10.62 K/UL — HIGH (ref 3.8–10.5)
WBC # FLD AUTO: 10.62 K/UL — HIGH (ref 3.8–10.5)

## 2021-12-30 RX ORDER — LIDOCAINE 4 G/100G
1 CREAM TOPICAL ONCE
Refills: 0 | Status: COMPLETED | OUTPATIENT
Start: 2021-12-30 | End: 2021-12-30

## 2021-12-30 RX ORDER — KETOROLAC TROMETHAMINE 30 MG/ML
15 SYRINGE (ML) INJECTION ONCE
Refills: 0 | Status: DISCONTINUED | OUTPATIENT
Start: 2021-12-30 | End: 2021-12-30

## 2021-12-30 RX ORDER — LIDOCAINE 4 G/100G
1 CREAM TOPICAL
Qty: 24 | Refills: 0
Start: 2021-12-30

## 2021-12-30 RX ORDER — ACETAMINOPHEN 500 MG
1 TABLET ORAL
Qty: 28 | Refills: 0
Start: 2021-12-30 | End: 2022-01-05

## 2021-12-30 RX ORDER — CYCLOBENZAPRINE HYDROCHLORIDE 10 MG/1
1 TABLET, FILM COATED ORAL
Qty: 30 | Refills: 0
Start: 2021-12-30

## 2021-12-30 RX ORDER — CYCLOBENZAPRINE HYDROCHLORIDE 10 MG/1
10 TABLET, FILM COATED ORAL ONCE
Refills: 0 | Status: COMPLETED | OUTPATIENT
Start: 2021-12-30 | End: 2021-12-30

## 2021-12-30 RX ADMIN — LIDOCAINE 1 PATCH: 4 CREAM TOPICAL at 00:40

## 2021-12-30 RX ADMIN — Medication 15 MILLIGRAM(S): at 00:39

## 2021-12-30 RX ADMIN — CYCLOBENZAPRINE HYDROCHLORIDE 10 MILLIGRAM(S): 10 TABLET, FILM COATED ORAL at 00:40

## 2021-12-30 NOTE — ED PROVIDER NOTE - PHYSICAL EXAMINATION
General: Awake, alert and oriented. No acute distress. Well developed, hydrated and nourished. Appears stated age.   Skin: Skin in warm, dry and intact without rashes or lesions. Appropriate color for ethnicity  HENMT: head normocephalic and atraumatic; bilateral external ears without swelling. no nasal discharge. moist oral mucosa. supple neck, trachea midline  EYES: Conjunctiva clear. nonicteric sclera. EOM intact, Eyelids are normal in appearance without swelling or lesions.  Cardiac: well perfused, s1, s2, rrr.  Respiratory: breathing comfortably on room air. no audible wheezing or stridor. no chest wall ttp  Abdominal: nondistended, aoft, atraumatic, nontender  MSK: Neck and back are without deformity, visible external skin changes, or signs of trauma. Curvature of the cervical, thoracic, and lumbar spine are within normal limits. no external signs of trauma. no apparent deficits in ROM of any extremity. ttp to left trapezius muscle. no midline spinal ttp  Neurological: The patient is awake, alert and oriented to person, place, and time with normal speech. CN 2-12 grossly intact. no apparent deficits. Memory is normal and thought process is intact. No gait abnormalities are appreciated.   Psychiatric: Appropriate mood and affect. Good judgement and insight. No visual or auditory hallucinations.

## 2021-12-30 NOTE — ED PROVIDER NOTE - PATIENT PORTAL LINK FT
You can access the FollowMyHealth Patient Portal offered by [facility] by registering at the following website: [email or web address]. By joining [facility]’s FollowMyHealth portal, you will also be able to view your health information using other applications (apps) compatible with our system.
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
## 2021-12-30 NOTE — ED ADULT NURSE NOTE - NSIMPLEMENTINTERV_GEN_ALL_ED
Implemented All Universal Safety Interventions:  [place] to call system. Call bell, personal items and telephone within reach. Instruct patient to call for assistance. Room bathroom lighting operational. Non-slip footwear when patient is off stretcher. Physically safe environment: no spills, clutter or unnecessary equipment. Stretcher in lowest position, wheels locked, appropriate side rails in place.
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
## 2022-06-13 ENCOUNTER — APPOINTMENT (OUTPATIENT)
Dept: FAMILY MEDICINE | Facility: CLINIC | Age: 77
End: 2022-06-13
Payer: MEDICARE

## 2022-06-13 VITALS
TEMPERATURE: 97.5 F | DIASTOLIC BLOOD PRESSURE: 80 MMHG | OXYGEN SATURATION: 97 % | HEART RATE: 79 BPM | BODY MASS INDEX: 22.66 KG/M2 | WEIGHT: 120 LBS | SYSTOLIC BLOOD PRESSURE: 140 MMHG | HEIGHT: 61 IN

## 2022-06-13 PROCEDURE — 99213 OFFICE O/P EST LOW 20 MIN: CPT

## 2022-06-13 NOTE — HISTORY OF PRESENT ILLNESS
[FreeTextEntry8] : 76 year old female here with complaints of diarrhea/vomitting for one day after having different food on friday. Patients active medications, allergies and issues were all reviewed with the patient at time of visit.\par

## 2022-06-13 NOTE — ASSESSMENT
[FreeTextEntry1] : gastroenteritis\par Patient was advised to drink plenty of fluid, preferably ones with electrolytes such as vitamin water or Gatorade. Patient was told to stick to a simple diet of bread, rice, tea and to avoid dairy, fatty foods and friend foods. Progress diet slowly and as tolerated. No antibiotics are necessary at this time.  If there is any worsening of symptoms, or no improvement, come back to this office or go directly to the ER.\par ate left over chicken saturday, sunday morning started vomiting, then progressive diarrhea \par will give zofran, prn\par dicyclomine prn \par \par requested covid test, performed\par

## 2022-06-14 LAB — SARS-COV-2 N GENE NPH QL NAA+PROBE: NOT DETECTED

## 2022-07-25 ENCOUNTER — APPOINTMENT (OUTPATIENT)
Dept: INTERNAL MEDICINE | Facility: CLINIC | Age: 77
End: 2022-07-25

## 2022-07-26 ENCOUNTER — APPOINTMENT (OUTPATIENT)
Dept: FAMILY MEDICINE | Facility: CLINIC | Age: 77
End: 2022-07-26

## 2022-07-26 PROCEDURE — 99213 OFFICE O/P EST LOW 20 MIN: CPT | Mod: 95

## 2022-08-24 ENCOUNTER — APPOINTMENT (OUTPATIENT)
Dept: FAMILY MEDICINE | Facility: CLINIC | Age: 77
End: 2022-08-24

## 2022-09-06 ENCOUNTER — APPOINTMENT (OUTPATIENT)
Dept: FAMILY MEDICINE | Facility: CLINIC | Age: 77
End: 2022-09-06

## 2022-09-06 ENCOUNTER — NON-APPOINTMENT (OUTPATIENT)
Age: 77
End: 2022-09-06

## 2022-09-06 VITALS
BODY MASS INDEX: 21.9 KG/M2 | WEIGHT: 116 LBS | SYSTOLIC BLOOD PRESSURE: 139 MMHG | TEMPERATURE: 97.7 F | DIASTOLIC BLOOD PRESSURE: 81 MMHG | HEIGHT: 61 IN | OXYGEN SATURATION: 98 % | HEART RATE: 97 BPM

## 2022-09-06 DIAGNOSIS — R51.9 HEADACHE, UNSPECIFIED: ICD-10-CM

## 2022-09-06 DIAGNOSIS — Z20.822 ENCOUNTER FOR PREPROCEDURAL LABORATORY EXAMINATION: ICD-10-CM

## 2022-09-06 DIAGNOSIS — J30.9 ALLERGIC RHINITIS, UNSPECIFIED: ICD-10-CM

## 2022-09-06 DIAGNOSIS — H92.09 OTALGIA, UNSPECIFIED EAR: ICD-10-CM

## 2022-09-06 DIAGNOSIS — J02.9 ACUTE PHARYNGITIS, UNSPECIFIED: ICD-10-CM

## 2022-09-06 DIAGNOSIS — U07.1 COVID-19: ICD-10-CM

## 2022-09-06 DIAGNOSIS — Z23 ENCOUNTER FOR IMMUNIZATION: ICD-10-CM

## 2022-09-06 DIAGNOSIS — Z01.812 ENCOUNTER FOR PREPROCEDURAL LABORATORY EXAMINATION: ICD-10-CM

## 2022-09-06 DIAGNOSIS — K52.9 NONINFECTIVE GASTROENTERITIS AND COLITIS, UNSPECIFIED: ICD-10-CM

## 2022-09-06 PROCEDURE — G0008: CPT

## 2022-09-06 PROCEDURE — 99214 OFFICE O/P EST MOD 30 MIN: CPT | Mod: 25

## 2022-09-06 PROCEDURE — 90662 IIV NO PRSV INCREASED AG IM: CPT

## 2022-09-06 RX ORDER — NIRMATRELVIR AND RITONAVIR 300-100 MG
20 X 150 MG & KIT ORAL
Qty: 1 | Refills: 0 | Status: DISCONTINUED | COMMUNITY
Start: 2022-07-26 | End: 2022-09-06

## 2022-09-06 RX ORDER — DICYCLOMINE HYDROCHLORIDE 10 MG/1
10 CAPSULE ORAL 3 TIMES DAILY
Qty: 30 | Refills: 0 | Status: DISCONTINUED | COMMUNITY
Start: 2022-06-13 | End: 2022-09-06

## 2022-09-06 RX ORDER — ONDANSETRON 4 MG/1
4 TABLET ORAL
Qty: 6 | Refills: 0 | Status: DISCONTINUED | COMMUNITY
Start: 2022-06-13 | End: 2022-09-06

## 2022-09-06 RX ORDER — PREDNISONE 10 MG/1
10 TABLET ORAL
Qty: 12 | Refills: 0 | Status: DISCONTINUED | COMMUNITY
Start: 2022-07-26 | End: 2022-09-06

## 2022-09-06 NOTE — PHYSICAL EXAM
[No Acute Distress] : no acute distress [Well Nourished] : well nourished [Normal Sclera/Conjunctiva] : normal sclera/conjunctiva [EOMI] : extraocular movements intact [Normal Outer Ear/Nose] : the outer ears and nose were normal in appearance [Normal Oropharynx] : the oropharynx was normal [Normal TMs] : both tympanic membranes were normal [Normal] : normal rate, regular rhythm, normal S1 and S2 and no murmur heard [Coordination Grossly Intact] : coordination grossly intact [Normal Affect] : the affect was normal [Alert and Oriented x3] : oriented to person, place, and time [Normal Insight/Judgement] : insight and judgment were intact

## 2022-09-06 NOTE — REVIEW OF SYSTEMS
[Earache] : earache [Nasal Discharge] : nasal discharge [Negative] : Psychiatric [FreeTextEntry6] : chest tightness, phlegm

## 2022-09-06 NOTE — HISTORY OF PRESENT ILLNESS
[FreeTextEntry1] : c/o residual symptoms since covid dx end of july, feels chest tightness, ear and nose congestion, particularly in the morning\par from [place], speaks [place]

## 2022-12-13 ENCOUNTER — APPOINTMENT (OUTPATIENT)
Dept: FAMILY MEDICINE | Facility: CLINIC | Age: 77
End: 2022-12-13

## 2022-12-13 ENCOUNTER — APPOINTMENT (OUTPATIENT)
Dept: CARDIOLOGY | Facility: CLINIC | Age: 77
End: 2022-12-13

## 2022-12-13 VITALS — HEART RATE: 64 BPM | SYSTOLIC BLOOD PRESSURE: 150 MMHG | DIASTOLIC BLOOD PRESSURE: 78 MMHG

## 2022-12-13 VITALS
BODY MASS INDEX: 21.34 KG/M2 | OXYGEN SATURATION: 98 % | WEIGHT: 113 LBS | TEMPERATURE: 97.5 F | HEART RATE: 76 BPM | HEIGHT: 61 IN | DIASTOLIC BLOOD PRESSURE: 78 MMHG | SYSTOLIC BLOOD PRESSURE: 120 MMHG

## 2022-12-13 DIAGNOSIS — Z00.00 ENCOUNTER FOR GENERAL ADULT MEDICAL EXAMINATION W/OUT ABNORMAL FINDINGS: ICD-10-CM

## 2022-12-13 DIAGNOSIS — R10.9 UNSPECIFIED ABDOMINAL PAIN: ICD-10-CM

## 2022-12-13 DIAGNOSIS — M81.0 AGE-RELATED OSTEOPOROSIS W/OUT CURRENT PATHOLOGICAL FRACTURE: ICD-10-CM

## 2022-12-13 DIAGNOSIS — D33.2 BENIGN NEOPLASM OF BRAIN, UNSPECIFIED: ICD-10-CM

## 2022-12-13 DIAGNOSIS — R06.09 OTHER FORMS OF DYSPNEA: ICD-10-CM

## 2022-12-13 DIAGNOSIS — R94.31 ABNORMAL ELECTROCARDIOGRAM [ECG] [EKG]: ICD-10-CM

## 2022-12-13 DIAGNOSIS — R07.89 OTHER CHEST PAIN: ICD-10-CM

## 2022-12-13 PROCEDURE — 99203 OFFICE O/P NEW LOW 30 MIN: CPT

## 2022-12-13 PROCEDURE — G0439: CPT

## 2022-12-13 PROCEDURE — 93000 ELECTROCARDIOGRAM COMPLETE: CPT

## 2022-12-13 PROCEDURE — 36415 COLL VENOUS BLD VENIPUNCTURE: CPT

## 2022-12-13 NOTE — HISTORY OF PRESENT ILLNESS
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

## 2022-12-13 NOTE — DISCUSSION/SUMMARY
[FreeTextEntry1] : Low voltage ECG with PRWP- longstanding changes\par No prior echo on file\par Some lingering dyspnea, chest tightness since COVID this past summer\par \par Will check echo\par BP running higher today, usually better. Pt is admittedly nervous. Trend closely\par Blood work done with PCP today

## 2022-12-14 LAB
25(OH)D3 SERPL-MCNC: 34.4 NG/ML
ALBUMIN SERPL ELPH-MCNC: 4.3 G/DL
ALP BLD-CCNC: 151 U/L
ALT SERPL-CCNC: 8 U/L
ANION GAP SERPL CALC-SCNC: 9 MMOL/L
APPEARANCE: CLEAR
AST SERPL-CCNC: 17 U/L
BACTERIA: NEGATIVE
BASOPHILS # BLD AUTO: 0.06 K/UL
BASOPHILS NFR BLD AUTO: 0.8 %
BILIRUB SERPL-MCNC: 0.3 MG/DL
BILIRUBIN URINE: NEGATIVE
BLOOD URINE: NEGATIVE
BUN SERPL-MCNC: 10 MG/DL
CALCIUM SERPL-MCNC: 9.3 MG/DL
CHLORIDE SERPL-SCNC: 103 MMOL/L
CHOLEST SERPL-MCNC: 180 MG/DL
CO2 SERPL-SCNC: 25 MMOL/L
COLOR: NORMAL
CREAT SERPL-MCNC: 0.6 MG/DL
EGFR: 94 ML/MIN/1.73M2
EOSINOPHIL # BLD AUTO: 0.21 K/UL
EOSINOPHIL NFR BLD AUTO: 2.8 %
ESTIMATED AVERAGE GLUCOSE: 120 MG/DL
GLUCOSE QUALITATIVE U: NEGATIVE
GLUCOSE SERPL-MCNC: 97 MG/DL
HBA1C MFR BLD HPLC: 5.8 %
HCT VFR BLD CALC: 40.3 %
HDLC SERPL-MCNC: 53 MG/DL
HGB BLD-MCNC: 12.6 G/DL
HYALINE CASTS: 0 /LPF
IMM GRANULOCYTES NFR BLD AUTO: 0.1 %
KETONES URINE: NEGATIVE
LDLC SERPL CALC-MCNC: 106 MG/DL
LEUKOCYTE ESTERASE URINE: ABNORMAL
LYMPHOCYTES # BLD AUTO: 2.28 K/UL
LYMPHOCYTES NFR BLD AUTO: 30.6 %
MAN DIFF?: NORMAL
MCHC RBC-ENTMCNC: 26.1 PG
MCHC RBC-ENTMCNC: 31.3 GM/DL
MCV RBC AUTO: 83.4 FL
MICROSCOPIC-UA: NORMAL
MONOCYTES # BLD AUTO: 0.56 K/UL
MONOCYTES NFR BLD AUTO: 7.5 %
NEUTROPHILS # BLD AUTO: 4.34 K/UL
NEUTROPHILS NFR BLD AUTO: 58.2 %
NITRITE URINE: NEGATIVE
NONHDLC SERPL-MCNC: 127 MG/DL
PH URINE: 6
PLATELET # BLD AUTO: 337 K/UL
POTASSIUM SERPL-SCNC: 4 MMOL/L
PROT SERPL-MCNC: 7.6 G/DL
PROTEIN URINE: NORMAL
RBC # BLD: 4.83 M/UL
RBC # FLD: 15.2 %
RED BLOOD CELLS URINE: 1 /HPF
SODIUM SERPL-SCNC: 138 MMOL/L
SPECIFIC GRAVITY URINE: 1.01
SQUAMOUS EPITHELIAL CELLS: 1 /HPF
TRIGL SERPL-MCNC: 104 MG/DL
TSH SERPL-ACNC: 1.79 UIU/ML
UROBILINOGEN URINE: NORMAL
WBC # FLD AUTO: 7.46 K/UL
WHITE BLOOD CELLS URINE: 2 /HPF

## 2023-01-09 ENCOUNTER — APPOINTMENT (OUTPATIENT)
Dept: INTERNAL MEDICINE | Facility: CLINIC | Age: 78
End: 2023-01-09

## 2023-01-10 ENCOUNTER — APPOINTMENT (OUTPATIENT)
Dept: INTERNAL MEDICINE | Facility: CLINIC | Age: 78
End: 2023-01-10
Payer: MEDICARE

## 2023-01-10 PROCEDURE — 93306 TTE W/DOPPLER COMPLETE: CPT

## 2023-05-10 ENCOUNTER — APPOINTMENT (OUTPATIENT)
Dept: PULMONOLOGY | Facility: CLINIC | Age: 78
End: 2023-05-10

## 2023-07-04 ENCOUNTER — EMERGENCY (EMERGENCY)
Facility: HOSPITAL | Age: 78
LOS: 1 days | Discharge: HOME | End: 2023-07-04
Attending: EMERGENCY MEDICINE | Admitting: EMERGENCY MEDICINE
Payer: MEDICARE

## 2023-07-04 VITALS
DIASTOLIC BLOOD PRESSURE: 49 MMHG | TEMPERATURE: 99 F | OXYGEN SATURATION: 96 % | SYSTOLIC BLOOD PRESSURE: 136 MMHG | HEART RATE: 74 BPM | RESPIRATION RATE: 16 BRPM

## 2023-07-04 PROCEDURE — 99284 EMERGENCY DEPT VISIT MOD MDM: CPT

## 2023-07-04 RX ORDER — IBUPROFEN 200 MG
400 TABLET ORAL ONCE
Refills: 0 | Status: COMPLETED | OUTPATIENT
Start: 2023-07-04 | End: 2023-07-04

## 2023-07-04 RX ADMIN — Medication 20 MILLIGRAM(S): at 14:23

## 2023-07-04 RX ADMIN — Medication 400 MILLIGRAM(S): at 14:53

## 2023-07-04 NOTE — ED PROVIDER NOTE - NSTIMEPROVIDERCAREINITIATE_GEN_ER
04-Jul-2023 13:26 Detail Level: Simple Instructions: This plan will send the code FBSD to the PM system.  DO NOT or CHANGE the price. Price (Do Not Change): 0.00

## 2023-07-04 NOTE — ED PROVIDER NOTE - PHYSICAL EXAMINATION
GENERAL: no acute distress, mesomorphic body habitus  HEENT: atraumatic, normocephalic, vision grossly intact, EOMI, no conjunctivitis or discharge, hearing grossly intact, no nasal discharge or epistaxis, clear pharynx  CV: regular rate, normal rhythm, normal S1/S2, no murmurs/rubs, no cyanosis  PULM: normal work of breathing, clear breath sounds in b/l upper/lower lung fields, no crackles/rales/rhonchi/wheezing  GI: soft/non-tender/nondistended abdomen, no guarding or rebound tenderness, no palpable masses  NEURO: A&Ox4, follows commands, normal speech, no focal motor or sensory deficits  MSK: no joint tenderness/swelling/erythema, ranging all extremities with no appreciable loss of ROM  EXT: no peripheral edema, no calf tenderness  SKIN: b/l patch-like/erythematous/dry rashes w/ 2-3 focal areas per leg  PSYCH: appropriate mood and affect

## 2023-07-04 NOTE — ED ADULT NURSE NOTE - NSFALLUNIVINTERV_ED_ALL_ED
Bed/Stretcher in lowest position, wheels locked, appropriate side rails in place/Call bell, personal items and telephone in reach/Instruct patient to call for assistance before getting out of bed/chair/stretcher/Non-slip footwear applied when patient is off stretcher/[place] to call system/Physically safe environment - no spills, clutter or unnecessary equipment/Purposeful proactive rounding/Room/bathroom lighting operational, light cord in reach Bed/Stretcher in lowest position, wheels locked, appropriate side rails in place/Call bell, personal items and telephone in reach/Instruct patient to call for assistance before getting out of bed/chair/stretcher/Non-slip footwear applied when patient is off stretcher/[place] to call system/Physically safe environment - no spills, clutter or unnecessary equipment/Purposeful proactive rounding/Room/bathroom lighting operational, light cord in reach Bed/Stretcher in lowest position, wheels locked, appropriate side rails in place/Call bell, personal items and telephone in reach/Instruct patient to call for assistance before getting out of bed/chair/stretcher/Non-slip footwear applied when patient is off stretcher/[place] to call system/Physically safe environment - no spills, clutter or unnecessary equipment/Purposeful proactive rounding/Room/bathroom lighting operational, light cord in reach

## 2023-07-04 NOTE — ED ADULT NURSE NOTE - OBJECTIVE STATEMENT
Patient presents to ED for itchiness, rash, pain shonda. lower extremities that started last Wednesday and started to worsen and spread around calves. She is AA&Ox4, in no acute distress, calm, cooperative. Respirations even, unlabored on room air. No slurring of speech, no swelling of mouth, able to speak full sentences. Denies CP, dizziness, SOB, dyspnea, N/V, fevers. Allergy to penicillin. Patient presents to ED for itchiness, rash, pain shonda. lower extremities that started about 5-6 months ago and worsened last Wednesday around calves. Round areas of redness observed shonda. calves. No drainage, no broken skin, no bleed. She is AA&Ox4, in no acute distress, calm, cooperative. Respirations even, unlabored on room air. No slurring of speech, no swelling of mouth, able to speak full sentences. Denies CP, dizziness, SOB, dyspnea, N/V, fevers. Allergy to penicillin.

## 2023-07-04 NOTE — ED PROVIDER NOTE - OBJECTIVE STATEMENT
76 yo F w/ osteoporosis presents for 3 weeks of worsening patch-like/pruritic/erythematous rashes on b/l calves. She first notice a few patches on b/l calves 3 weeks ago. She started taking her son's  ketoconazole 7d ago, but since then her rashes have become more confluent and she has burning sensation. She has previously had similar rashes a few months ago w/ improvement w/ betemethasone 0.1% cream. She endorses using copper compression stockings daily for the past few months and a new shampoo for the past 2 weeks. ROS is negative for fevers/chills/cough/sore throats, travel, bleeding disorder hx, mucosal rashes, CP, SOB, lip swelling, abdominal pain, or  symptoms. 78 yo F w/ osteoporosis presents for 3 weeks of worsening patch-like/pruritic/erythematous rashes on b/l calves. She first notice a few patches on b/l calves 3 weeks ago. She started taking her son's  ketoconazole 7d ago, but since then her rashes have become more confluent and she has burning sensation. She has previously had similar rashes a few months ago w/ improvement w/ betemethasone 0.1% cream. She endorses using copper compression stockings daily for the past few months and a new shampoo for the past 2 weeks. ROS is negative for fevers/chills/cough/sore throats, travel, bleeding disorder hx, mucosal rashes, CP, SOB, lip swelling, abdominal pain, or  symptoms.

## 2023-07-04 NOTE — ED PROVIDER NOTE - CLINICAL SUMMARY MEDICAL DECISION MAKING FREE TEXT BOX
76 yo F w/ osteoporosis presents for 3 weeks of worsening patch-like/pruritic/erythematous rashes on b/l calves. Physical exam is remarkable for b/l patch-like/erythematous/dry rashes w/ 2-3 focal areas per leg. Concern for allergic/contact dermatitis vs psoriasis vs erythema nodosum . Plan for oral prednisone 20 mg for 7-10 days w/ OP derm follow-up. 78 yo F w/ osteoporosis presents for 3 weeks of worsening patch-like/pruritic/erythematous rashes on b/l calves. Physical exam is remarkable for b/l patch-like/erythematous/dry rashes w/ 2-3 focal areas per leg. Concern for allergic/contact dermatitis vs psoriasis vs erythema nodosum . Plan for oral prednisone 20 mg for 7-10 days w/ OP derm follow-up.

## 2023-07-04 NOTE — ED PROVIDER NOTE - ATTENDING CONTRIBUTION TO CARE
DR. EVANS, ATTENDING MD-  I performed a face to face bedside interview with the patient regarding history of present illness, review of symptoms and past medical history. I completed an independent physical exam.  I have discussed the patient's plan of care with the resident.   Documentation as above in the note.    77-year-old female here with rash to bilateral lower extremities for few weeks.  Rashes intermittently pruritic tender to palpation.  She otherwise feels at her usual state of health.  No new medications or known exposures.  On exam rash consistent with erythema nodosum.  There is no bullae no crepitus.  No mucosal involvement.  Will treat with steroid give close Derm follow-up as outpatient.

## 2023-07-04 NOTE — ED ADULT TRIAGE NOTE - CHIEF COMPLAINT QUOTE
Pt. c/o redness and pain to bilateral LE. States she had a few itchy red bumps to both calfs a few days ago. Pt. was itching the area a lot and now area is more inflamed and rash appearance. Denies fevers.

## 2023-07-04 NOTE — ED PROVIDER NOTE - NSFOLLOWUPINSTRUCTIONS_ED_ALL_ED_FT
You were seen for worsening patch-like, itchy rashes overlying both calves for the past 3 weeks. You were evaluated with physical exam and your rash was concerning for contact dermatitis versus erythema nodosum. You were treated with prednisone and ibuprofen.  Verbal instructions provided, including instructions to return to ED immediately for any new, worsening, or concerning symptoms. Patient and/or family/caregiver endorsed understanding.    Please take the following medications at home:   - ibuprofen 400 mg by mouth every 6 hours until symptoms resolve   - prednisone 20 mg by mouth once daily for 7 days    Please follow up with a dermatologist and/or primary care provider regarding this ED visit.    Thank you for choosing us for your care.

## 2023-07-19 ENCOUNTER — NON-APPOINTMENT (OUTPATIENT)
Age: 78
End: 2023-07-19

## 2023-07-20 ENCOUNTER — APPOINTMENT (OUTPATIENT)
Age: 78
End: 2023-07-20
Payer: MEDICARE

## 2023-07-20 VITALS — WEIGHT: 113 LBS | HEIGHT: 61 IN | BODY MASS INDEX: 21.34 KG/M2

## 2023-07-20 DIAGNOSIS — D48.5 NEOPLASM OF UNCERTAIN BEHAVIOR OF SKIN: ICD-10-CM

## 2023-07-20 PROCEDURE — 11102 TANGNTL BX SKIN SINGLE LES: CPT

## 2023-07-20 PROCEDURE — 99204 OFFICE O/P NEW MOD 45 MIN: CPT | Mod: 25

## 2023-08-07 LAB — DERMATOLOGY BIOPSY: NORMAL

## 2023-11-23 ENCOUNTER — INPATIENT (INPATIENT)
Facility: HOSPITAL | Age: 78
LOS: 5 days | Discharge: HOME | End: 2023-11-29
Attending: HOSPITALIST | Admitting: HOSPITALIST
Payer: MEDICARE

## 2023-11-23 VITALS
TEMPERATURE: 98 F | RESPIRATION RATE: 17 BRPM | SYSTOLIC BLOOD PRESSURE: 157 MMHG | OXYGEN SATURATION: 100 % | HEART RATE: 71 BPM | DIASTOLIC BLOOD PRESSURE: 73 MMHG

## 2023-11-23 DIAGNOSIS — R42 DIZZINESS AND GIDDINESS: ICD-10-CM

## 2023-11-23 LAB
ALBUMIN SERPL ELPH-MCNC: 4.2 G/DL — SIGNIFICANT CHANGE UP (ref 3.3–5)
ALBUMIN SERPL ELPH-MCNC: 4.2 G/DL — SIGNIFICANT CHANGE UP (ref 3.3–5)
ALP SERPL-CCNC: 110 U/L — SIGNIFICANT CHANGE UP (ref 40–120)
ALP SERPL-CCNC: 110 U/L — SIGNIFICANT CHANGE UP (ref 40–120)
ALT FLD-CCNC: 11 U/L — SIGNIFICANT CHANGE UP (ref 4–33)
ALT FLD-CCNC: 11 U/L — SIGNIFICANT CHANGE UP (ref 4–33)
ANION GAP SERPL CALC-SCNC: 12 MMOL/L — SIGNIFICANT CHANGE UP (ref 7–14)
ANION GAP SERPL CALC-SCNC: 12 MMOL/L — SIGNIFICANT CHANGE UP (ref 7–14)
APTT BLD: 32.4 SEC — SIGNIFICANT CHANGE UP (ref 24.5–35.6)
APTT BLD: 32.4 SEC — SIGNIFICANT CHANGE UP (ref 24.5–35.6)
AST SERPL-CCNC: 19 U/L — SIGNIFICANT CHANGE UP (ref 4–32)
AST SERPL-CCNC: 19 U/L — SIGNIFICANT CHANGE UP (ref 4–32)
BASOPHILS # BLD AUTO: 0.06 K/UL — SIGNIFICANT CHANGE UP (ref 0–0.2)
BASOPHILS # BLD AUTO: 0.06 K/UL — SIGNIFICANT CHANGE UP (ref 0–0.2)
BASOPHILS NFR BLD AUTO: 0.6 % — SIGNIFICANT CHANGE UP (ref 0–2)
BASOPHILS NFR BLD AUTO: 0.6 % — SIGNIFICANT CHANGE UP (ref 0–2)
BILIRUB SERPL-MCNC: <0.2 MG/DL — SIGNIFICANT CHANGE UP (ref 0.2–1.2)
BILIRUB SERPL-MCNC: <0.2 MG/DL — SIGNIFICANT CHANGE UP (ref 0.2–1.2)
BUN SERPL-MCNC: 13 MG/DL — SIGNIFICANT CHANGE UP (ref 7–23)
BUN SERPL-MCNC: 13 MG/DL — SIGNIFICANT CHANGE UP (ref 7–23)
CALCIUM SERPL-MCNC: 9.4 MG/DL — SIGNIFICANT CHANGE UP (ref 8.4–10.5)
CALCIUM SERPL-MCNC: 9.4 MG/DL — SIGNIFICANT CHANGE UP (ref 8.4–10.5)
CHLORIDE SERPL-SCNC: 104 MMOL/L — SIGNIFICANT CHANGE UP (ref 98–107)
CHLORIDE SERPL-SCNC: 104 MMOL/L — SIGNIFICANT CHANGE UP (ref 98–107)
CO2 SERPL-SCNC: 24 MMOL/L — SIGNIFICANT CHANGE UP (ref 22–31)
CO2 SERPL-SCNC: 24 MMOL/L — SIGNIFICANT CHANGE UP (ref 22–31)
CREAT SERPL-MCNC: 0.52 MG/DL — SIGNIFICANT CHANGE UP (ref 0.5–1.3)
CREAT SERPL-MCNC: 0.52 MG/DL — SIGNIFICANT CHANGE UP (ref 0.5–1.3)
EGFR: 96 ML/MIN/1.73M2 — SIGNIFICANT CHANGE UP
EGFR: 96 ML/MIN/1.73M2 — SIGNIFICANT CHANGE UP
EOSINOPHIL # BLD AUTO: 0.13 K/UL — SIGNIFICANT CHANGE UP (ref 0–0.5)
EOSINOPHIL # BLD AUTO: 0.13 K/UL — SIGNIFICANT CHANGE UP (ref 0–0.5)
EOSINOPHIL NFR BLD AUTO: 1.3 % — SIGNIFICANT CHANGE UP (ref 0–6)
EOSINOPHIL NFR BLD AUTO: 1.3 % — SIGNIFICANT CHANGE UP (ref 0–6)
GLUCOSE SERPL-MCNC: 93 MG/DL — SIGNIFICANT CHANGE UP (ref 70–99)
GLUCOSE SERPL-MCNC: 93 MG/DL — SIGNIFICANT CHANGE UP (ref 70–99)
HCT VFR BLD CALC: 38.2 % — SIGNIFICANT CHANGE UP (ref 34.5–45)
HCT VFR BLD CALC: 38.2 % — SIGNIFICANT CHANGE UP (ref 34.5–45)
HGB BLD-MCNC: 12.5 G/DL — SIGNIFICANT CHANGE UP (ref 11.5–15.5)
HGB BLD-MCNC: 12.5 G/DL — SIGNIFICANT CHANGE UP (ref 11.5–15.5)
IANC: 7.26 K/UL — SIGNIFICANT CHANGE UP (ref 1.8–7.4)
IANC: 7.26 K/UL — SIGNIFICANT CHANGE UP (ref 1.8–7.4)
IMM GRANULOCYTES NFR BLD AUTO: 0.3 % — SIGNIFICANT CHANGE UP (ref 0–0.9)
IMM GRANULOCYTES NFR BLD AUTO: 0.3 % — SIGNIFICANT CHANGE UP (ref 0–0.9)
INR BLD: 1.03 RATIO — SIGNIFICANT CHANGE UP (ref 0.85–1.18)
INR BLD: 1.03 RATIO — SIGNIFICANT CHANGE UP (ref 0.85–1.18)
LYMPHOCYTES # BLD AUTO: 1.91 K/UL — SIGNIFICANT CHANGE UP (ref 1–3.3)
LYMPHOCYTES # BLD AUTO: 1.91 K/UL — SIGNIFICANT CHANGE UP (ref 1–3.3)
LYMPHOCYTES # BLD AUTO: 18.9 % — SIGNIFICANT CHANGE UP (ref 13–44)
LYMPHOCYTES # BLD AUTO: 18.9 % — SIGNIFICANT CHANGE UP (ref 13–44)
MCHC RBC-ENTMCNC: 26.4 PG — LOW (ref 27–34)
MCHC RBC-ENTMCNC: 26.4 PG — LOW (ref 27–34)
MCHC RBC-ENTMCNC: 32.7 GM/DL — SIGNIFICANT CHANGE UP (ref 32–36)
MCHC RBC-ENTMCNC: 32.7 GM/DL — SIGNIFICANT CHANGE UP (ref 32–36)
MCV RBC AUTO: 80.6 FL — SIGNIFICANT CHANGE UP (ref 80–100)
MCV RBC AUTO: 80.6 FL — SIGNIFICANT CHANGE UP (ref 80–100)
MONOCYTES # BLD AUTO: 0.74 K/UL — SIGNIFICANT CHANGE UP (ref 0–0.9)
MONOCYTES # BLD AUTO: 0.74 K/UL — SIGNIFICANT CHANGE UP (ref 0–0.9)
MONOCYTES NFR BLD AUTO: 7.3 % — SIGNIFICANT CHANGE UP (ref 2–14)
MONOCYTES NFR BLD AUTO: 7.3 % — SIGNIFICANT CHANGE UP (ref 2–14)
NEUTROPHILS # BLD AUTO: 7.26 K/UL — SIGNIFICANT CHANGE UP (ref 1.8–7.4)
NEUTROPHILS # BLD AUTO: 7.26 K/UL — SIGNIFICANT CHANGE UP (ref 1.8–7.4)
NEUTROPHILS NFR BLD AUTO: 71.6 % — SIGNIFICANT CHANGE UP (ref 43–77)
NEUTROPHILS NFR BLD AUTO: 71.6 % — SIGNIFICANT CHANGE UP (ref 43–77)
NRBC # BLD: 0 /100 WBCS — SIGNIFICANT CHANGE UP (ref 0–0)
NRBC # BLD: 0 /100 WBCS — SIGNIFICANT CHANGE UP (ref 0–0)
NRBC # FLD: 0 K/UL — SIGNIFICANT CHANGE UP (ref 0–0)
NRBC # FLD: 0 K/UL — SIGNIFICANT CHANGE UP (ref 0–0)
PLATELET # BLD AUTO: 352 K/UL — SIGNIFICANT CHANGE UP (ref 150–400)
PLATELET # BLD AUTO: 352 K/UL — SIGNIFICANT CHANGE UP (ref 150–400)
POTASSIUM SERPL-MCNC: 3.7 MMOL/L — SIGNIFICANT CHANGE UP (ref 3.5–5.3)
POTASSIUM SERPL-MCNC: 3.7 MMOL/L — SIGNIFICANT CHANGE UP (ref 3.5–5.3)
POTASSIUM SERPL-SCNC: 3.7 MMOL/L — SIGNIFICANT CHANGE UP (ref 3.5–5.3)
POTASSIUM SERPL-SCNC: 3.7 MMOL/L — SIGNIFICANT CHANGE UP (ref 3.5–5.3)
PROT SERPL-MCNC: 7.6 G/DL — SIGNIFICANT CHANGE UP (ref 6–8.3)
PROT SERPL-MCNC: 7.6 G/DL — SIGNIFICANT CHANGE UP (ref 6–8.3)
PROTHROM AB SERPL-ACNC: 11.5 SEC — SIGNIFICANT CHANGE UP (ref 9.5–13)
PROTHROM AB SERPL-ACNC: 11.5 SEC — SIGNIFICANT CHANGE UP (ref 9.5–13)
RBC # BLD: 4.74 M/UL — SIGNIFICANT CHANGE UP (ref 3.8–5.2)
RBC # BLD: 4.74 M/UL — SIGNIFICANT CHANGE UP (ref 3.8–5.2)
RBC # FLD: 14 % — SIGNIFICANT CHANGE UP (ref 10.3–14.5)
RBC # FLD: 14 % — SIGNIFICANT CHANGE UP (ref 10.3–14.5)
SODIUM SERPL-SCNC: 140 MMOL/L — SIGNIFICANT CHANGE UP (ref 135–145)
SODIUM SERPL-SCNC: 140 MMOL/L — SIGNIFICANT CHANGE UP (ref 135–145)
TROPONIN T, HIGH SENSITIVITY RESULT: 6 NG/L — SIGNIFICANT CHANGE UP
TROPONIN T, HIGH SENSITIVITY RESULT: 6 NG/L — SIGNIFICANT CHANGE UP
TROPONIN T, HIGH SENSITIVITY RESULT: <6 NG/L — SIGNIFICANT CHANGE UP
TROPONIN T, HIGH SENSITIVITY RESULT: <6 NG/L — SIGNIFICANT CHANGE UP
WBC # BLD: 10.13 K/UL — SIGNIFICANT CHANGE UP (ref 3.8–10.5)
WBC # BLD: 10.13 K/UL — SIGNIFICANT CHANGE UP (ref 3.8–10.5)
WBC # FLD AUTO: 10.13 K/UL — SIGNIFICANT CHANGE UP (ref 3.8–10.5)
WBC # FLD AUTO: 10.13 K/UL — SIGNIFICANT CHANGE UP (ref 3.8–10.5)

## 2023-11-23 PROCEDURE — 71250 CT THORAX DX C-: CPT | Mod: 26,MA

## 2023-11-23 PROCEDURE — 70498 CT ANGIOGRAPHY NECK: CPT | Mod: 26,MA

## 2023-11-23 PROCEDURE — 70496 CT ANGIOGRAPHY HEAD: CPT | Mod: 26,MA

## 2023-11-23 PROCEDURE — 99285 EMERGENCY DEPT VISIT HI MDM: CPT

## 2023-11-23 PROCEDURE — 70450 CT HEAD/BRAIN W/O DYE: CPT | Mod: 26,MA,59

## 2023-11-23 RX ORDER — LANOLIN ALCOHOL/MO/W.PET/CERES
3 CREAM (GRAM) TOPICAL AT BEDTIME
Refills: 0 | Status: DISCONTINUED | OUTPATIENT
Start: 2023-11-23 | End: 2023-11-29

## 2023-11-23 RX ORDER — ACETAMINOPHEN 500 MG
975 TABLET ORAL ONCE
Refills: 0 | Status: COMPLETED | OUTPATIENT
Start: 2023-11-23 | End: 2023-11-23

## 2023-11-23 RX ORDER — IBUPROFEN 200 MG
600 TABLET ORAL ONCE
Refills: 0 | Status: COMPLETED | OUTPATIENT
Start: 2023-11-23 | End: 2023-11-23

## 2023-11-23 RX ORDER — MECLIZINE HCL 12.5 MG
25 TABLET ORAL ONCE
Refills: 0 | Status: COMPLETED | OUTPATIENT
Start: 2023-11-23 | End: 2023-11-23

## 2023-11-23 RX ADMIN — Medication 600 MILLIGRAM(S): at 20:37

## 2023-11-23 RX ADMIN — Medication 600 MILLIGRAM(S): at 21:07

## 2023-11-23 RX ADMIN — Medication 975 MILLIGRAM(S): at 17:01

## 2023-11-23 RX ADMIN — Medication 975 MILLIGRAM(S): at 17:31

## 2023-11-23 NOTE — ED ADULT NURSE REASSESSMENT NOTE - NS ED NURSE REASSESS COMMENT FT1
Received report from Day RN Tre Mosher: Pt appears to be resting comfortably, NAD, respirations are even and unlabored, denies any complaints at this moment, Safety precautions implemented as per protocol, awaiting further MD orders, plan of care ongoing.

## 2023-11-23 NOTE — ED PROVIDER NOTE - CADM POA CENTRAL LINE
Subjective:      Tabby Kee is a 11 y.o. female who presents with Ankle Pain (left ankle tripped over a sprinkler today around 11 am) and Wrist Injury (hurts a little but not like the ankle)            Patient reports she stepped on sprinkler rolling her left ankle into a concrete stair this morning. She was able to bear weight however with significant pain.  Reports she is also having left wrist pain but that is not bothering her as much as the ankle.       Ankle Pain  This is a new problem. The current episode started today. The problem occurs constantly. The problem has been unchanged. Pertinent negatives include no abdominal pain, chills, congestion, coughing, fever, myalgias, nausea, numbness, rash, sore throat, vomiting or weakness. The symptoms are aggravated by standing and walking (Palpation). She has tried NSAIDs for the symptoms. The treatment provided mild relief.   Wrist Injury  This is a new problem. The current episode started today. The problem occurs constantly. The problem has been gradually improving. Pertinent negatives include no abdominal pain, chills, congestion, coughing, fever, myalgias, nausea, numbness, rash, sore throat, vomiting or weakness. Exacerbated by: Palpation. She has tried NSAIDs for the symptoms. The treatment provided mild relief.       Review of Systems   Constitutional: Negative for chills, fever and malaise/fatigue.   HENT: Negative for congestion, ear discharge, ear pain, nosebleeds and sore throat.    Eyes: Negative for discharge and redness.   Respiratory: Negative for cough, shortness of breath, wheezing and stridor.    Cardiovascular: Negative for leg swelling.   Gastrointestinal: Negative for abdominal pain, blood in stool, constipation, diarrhea, nausea and vomiting.   Musculoskeletal: Positive for falls and joint pain. Negative for myalgias.   Skin: Negative for rash.   Neurological: Negative for speech change, seizures, loss of consciousness, weakness and numbness.  "  Endo/Heme/Allergies: Does not bruise/bleed easily.   Psychiatric/Behavioral: The patient is not nervous/anxious and does not have insomnia.    All other systems reviewed and are negative.         Objective:     Pulse 106   Temp 36.7 °C (98 °F) (Temporal)   Resp 20   Ht 1.486 m (4' 10.5\")   Wt 67.9 kg (149 lb 9.6 oz)   SpO2 99%   BMI 30.73 kg/m²      Physical Exam  Vitals signs reviewed.   Constitutional:       General: She is active. She is not in acute distress.     Appearance: She is not toxic-appearing.   HENT:      Head: Normocephalic and atraumatic.      Right Ear: Tympanic membrane, ear canal and external ear normal.      Left Ear: Tympanic membrane, ear canal and external ear normal.      Nose: Nose normal. No congestion or rhinorrhea.      Mouth/Throat:      Mouth: Mucous membranes are moist.      Pharynx: No oropharyngeal exudate or posterior oropharyngeal erythema.   Eyes:      Pupils: Pupils are equal, round, and reactive to light.   Neck:      Musculoskeletal: Neck supple.   Cardiovascular:      Rate and Rhythm: Normal rate and regular rhythm.      Pulses: Normal pulses.      Heart sounds: Normal heart sounds. No murmur.   Pulmonary:      Effort: Pulmonary effort is normal.      Breath sounds: Normal breath sounds.   Abdominal:      General: Bowel sounds are normal.      Palpations: Abdomen is soft. There is no mass.      Tenderness: There is no abdominal tenderness.   Musculoskeletal:         General: Tenderness present. No swelling or deformity.      Left wrist: She exhibits decreased range of motion, tenderness and bony tenderness. She exhibits no swelling, no effusion, no crepitus, no deformity and no laceration.      Left ankle: She exhibits decreased range of motion and swelling. She exhibits no ecchymosis and normal pulse. Tenderness. Lateral malleolus tenderness found. Achilles tendon normal. Achilles tendon exhibits no pain.        Arms:    Lymphadenopathy:      Cervical: No cervical " adenopathy.   Skin:     General: Skin is warm and dry.      Capillary Refill: Capillary refill takes less than 2 seconds.      Findings: No rash.   Neurological:      Mental Status: She is alert and oriented for age.   Psychiatric:         Mood and Affect: Mood normal.         Behavior: Behavior normal.       Reading Physician Reading Date Result Priority   Salvatore Meade M.D.  [phone] 9/19/2020 [facility]      Narrative & Impression        9/19/2020 3:59 PM     HISTORY/REASON FOR EXAM:  Pain/Deformity Following Trauma.        TECHNIQUE/EXAM DESCRIPTION AND NUMBER OF VIEWS:  3 views of the LEFT wrist.     COMPARISON: None     FINDINGS:  Nondisplaced buckle fracture of the volar aspect of the distal radial metaphysis. No extension to the physis. No dislocation.     IMPRESSION:     Buckle fracture of the distal radial metaphysis.     Reading Physician Reading Date Result Priority   Salvatore Meade M.D.  [phone] 9/19/2020 [facility]      Narrative & Impression        9/19/2020 3:59 PM     HISTORY/REASON FOR EXAM:  Pain/Deformity Following Trauma.        TECHNIQUE/EXAM DESCRIPTION AND NUMBER OF VIEWS:  3 views of the LEFT ankle.     COMPARISON: None.     FINDINGS:     There is no fracture or dislocation of the ankle. The fifth metatarsal base is somewhat suboptimally visualized with a lucency seen on the lateral projection. This could be related to the apophysis however a fracture is difficult to exclude. Consider   follow-up with foot radiographs if there is pain to this region. The visualized osseous structures are in anatomic alignment.  Ankle mortise is symmetric.  The joint spaces are preserved.  Bone mineralization is age-appropriate..        IMPRESSION:     Suboptimal evaluation of the fifth metatarsal base. Correlate with point tenderness an consider foot radiographs as clinically indicated.     Otherwise no acute fracture or dislocation of the ankle               Assessment/Plan:     1. Closed torus  fracture of distal end of left radius, initial encounter  - DX-WRIST-COMPLETE 3+ LEFT; Future  - REFERRAL TO SPORTS MEDICINE    2. Injury of left ankle, initial encounter  Xray: no fracture or dislocation per radiology  - DX-ANKLE 3+ VIEWS LEFT; Future  - REFERRAL TO SPORTS MEDICINE  - DX-FOOT-2- LEFT; Future    RICE therapy discussed  Volar Splint placed left wrist.   WBAT left ankle  Ice/heat therapy discussed  OTC ibuprofen for pain control  Rest, fluids encouraged.  AVS with medical info given.  Pt was in full understanding and agreement with the plan.  Follow-up as needed if symptoms worsen or fail to improve.       No

## 2023-11-23 NOTE — ED ADULT NURSE NOTE - NSFALLRISKINTERV_ED_ALL_ED
Communicate fall risk and risk factors to all staff, patient, and family/Provide visual cue: yellow wristband, yellow gown, etc/Reinforce activity limits and safety measures with patient and family/Call bell, personal items and telephone in reach/Instruct patient to call for assistance before getting out of bed/chair/stretcher/Non-slip footwear applied when patient is off stretcher/[place] to call system/Physically safe environment - no spills, clutter or unnecessary equipment/Purposeful Proactive Rounding/Room/bathroom lighting operational, light cord in reach Communicate fall risk and risk factors to all staff, patient, and family/Provide visual cue: yellow wristband, yellow gown, etc/Reinforce activity limits and safety measures with patient and family/Call bell, personal items and telephone in reach/Instruct patient to call for assistance before getting out of bed/chair/stretcher/Non-slip footwear applied when patient is off stretcher/[place] to call system/Physically safe environment - no spills, clutter or unnecessary equipment/Purposeful Proactive Rounding/Room/bathroom lighting operational, light cord in reach Communicate fall risk and risk factors to all staff, patient, and family/Provide visual cue: yellow wristband, yellow gown, etc/Reinforce activity limits and safety measures with patient and family/Call bell, personal items and telephone in reach/Instruct patient to call for assistance before getting out of bed/chair/stretcher/Non-slip footwear applied when patient is off stretcher/[place] to call system/Physically safe environment - no spills, clutter or unnecessary equipment/Purposeful Proactive Rounding/Room/bathroom lighting operational, light cord in reach

## 2023-11-23 NOTE — ED ADULT NURSE NOTE - OBJECTIVE STATEMENT
Patient presents to ED for left sided chest pain worsening over the past week s/p fall. She is A&Ox4, in no acute distress, calm, cooperative. She states she was in the kitchen cleaning dishes last Wednesday when she slipped and fell. Denies hitting head, no LOC. She states she has pain left chest that worsens with deep breath and is tender to touch. No dizziness, SOB, dyspnea, N/V, fevers, chills. History osteoporosis. On cardiac monitor showing NSR in 70s bpm. 20G IV placed left AC.

## 2023-11-23 NOTE — ED ADULT NURSE NOTE - NSFALLCONCLUSION_ED_ALL_ED
Fall Risk Additional Notes: Specimen(s) sent to [facility] for testing

## 2023-11-23 NOTE — ED ADULT TRIAGE NOTE - CHIEF COMPLAINT QUOTE
patient c/o chest pain x1 day. patient also endorsing dizziness x3 days. patient denies any shortness of breath, nausea or vomiting. denies past medical history.

## 2023-11-23 NOTE — ED PROVIDER NOTE - PROGRESS NOTE DETAILS
Patient is unsteady while walking and states on reeval that she has had dizziness since August that may have contributed to her fall. She lives alone and does not feel comfortable going home due to her intermittent dizziness. Will admit at this time.    Sandra Zimmerman MD, PGY3

## 2023-11-23 NOTE — ED PROVIDER NOTE - CLINICAL SUMMARY MEDICAL DECISION MAKING FREE TEXT BOX
79yo female pt who presents to the ED for chest wall pain after recent fall and injury over L chest wall. Patient endorses pain has been worsening since. On exam, found uncomfortable with twisting and bending. Palpation of the left chest wall reproduces pain. Will evaluate for rib fractures vs rib bruising. will rule out hemo/pneumo thorax however found with BL clear breath sounds. Will give pain medication and reassess 77yo female pt who presents to the ED for chest wall pain after recent fall and injury over L chest wall. Patient endorses pain has been worsening since. On exam, found uncomfortable with twisting and bending. Palpation of the left chest wall reproduces pain. Will evaluate for rib fractures vs rib bruising. will rule out hemo/pneumo thorax however found with BL clear breath sounds. Will give pain medication and reassess

## 2023-11-23 NOTE — CONSULT NOTE ADULT - SUBJECTIVE AND OBJECTIVE BOX
HPI:  This 79 yo female with PMH of osteoporosis presented to the ER c/o L-sided chest wall pain since falling 1 week ago. She slipped and fell onto a tile floor, striking the left side of her chest.  The pain worsens with deep breathing and has not improved over the week, prompting her visit to the ER for an evaluation.  She takes Motrin as needed for the pain.    PAST MEDICAL & SURGICAL HISTORY:  Osteoporosis  H/O: varicose veins  No PSH    REVIEW OF SYSTEMS  General: No h/a or dizziness	  Skin/Breast: No Hematoma or bruise	  Ophthalmologic: No Blurry vision	  ENMT: No Hearing loss	  Respiratory and Thorax: + L chest Pleuritic pain;  No Cough, Wheezing, SOB, Hemoptysis, or Sputum production	  Cardiovascular: No SSCP, Palpitations, or Diaphoresis	  Gastrointestinal: No Nausea or Vomiting	  Genitourinary: No Hematuria or Dysuria  Musculoskeletal: See HPI  Neurological: No Seizures  Psychiatric: No Dementia or Depression  Hematology/Lymphatics: No hx of bleeding or Edema; No Blood-thinners	  Endocrine: No DM  Allergic/Immunologic: No Anaphylaxis, Intolerance, or Recent illnesses    MEDICATIONS  (Home):  Motrin PRN    ALLERGIES:  penicillin (Rash)    SOCIAL HISTORY:  No smoking, EtOH, or recreational drugs    FAMILY HISTORY:  Not contributory    Vital Signs Last 24 Hrs  T(C): 36.7 (23 Nov 2023 19:06), Max: 36.7 (23 Nov 2023 13:40)  T(F): 98 (23 Nov 2023 19:06), Max: 98.1 (23 Nov 2023 13:40)  HR: 75 (23 Nov 2023 19:06) (71 - 75)  BP: 116/98 (23 Nov 2023 19:06) (116/98 - 157/73)  RR: 16 (23 Nov 2023 19:06) (16 - 17)  SpO2: 100% (23 Nov 2023 19:06) (100% - 100%) RA    General: WN/WD NAD  Neurology: Awake, nonfocal, MORAN x 4  Eyes: Scleras clear, Gross vision intact  ENT: Gross hearing intact, grossly patent pharynx, no stridor  Neck: Neck supple, trachea midline, No JVD,   Respiratory: CTA B/L, No wheezing, rales, rhonchi  Chest: TTP over L ant/ lat ribs; No hematoma, No ecchymosis  CV: RRR, S1, S2, no murmurs  Abdominal: Soft, NT, ND   Extremities: No edema  Psych: Oriented x 3, normal affect    LABS:                        12.5   10.13 )-----------( 352      ( 23 Nov 2023 16:12 )             38.2     11-23    140  |  104  |  13  ----------------------------<  93  3.7   |  24  |  0.52    Ca    9.4      23 Nov 2023 16:12    TPro  7.6  /  Alb  4.2  /  TBili  <0.2  /  DBili  x   /  AST  19  /  ALT  11  /  AlkPhos  110  11-23    PT/INR - ( 23 Nov 2023 16:12 )   PT: 11.5 sec;   INR: 1.03 ratio    PTT - ( 23 Nov 2023 16:12 )  PTT:32.4 sec    Urinalysis Basic - ( 23 Nov 2023 16:12 )  Color: x / Appearance: x / SG: x / pH: x  Gluc: 93 mg/dL / Ketone: x  / Bili: x / Urobili: x   Blood: x / Protein: x / Nitrite: x   Leuk Esterase: x / RBC: x / WBC x   Sq Epi: x / Non Sq Epi: x / Bacteria: x    RADIOLOGY & ADDITIONAL STUDIES:  < from: CT Chest No Cont (11.23.23 @ 17:00) >  1.  Left lateral fourth through seventh rib fractures.  2.  No pneumothorax.  3.  Small pleural effusion likely of simple fluid density.  4.  Cholelithiasis.             HPI:  This 77 yo female with PMH of osteoporosis presented to the ER c/o L-sided chest wall pain since falling 1 week ago. She slipped and fell onto a tile floor, striking the left side of her chest.  The pain worsens with deep breathing and has not improved over the week, prompting her visit to the ER for an evaluation.  She takes Motrin as needed for the pain.    PAST MEDICAL & SURGICAL HISTORY:  Osteoporosis  H/O: varicose veins  No PSH    REVIEW OF SYSTEMS  General: No h/a or dizziness	  Skin/Breast: No Hematoma or bruise	  Ophthalmologic: No Blurry vision	  ENMT: No Hearing loss	  Respiratory and Thorax: + L chest Pleuritic pain;  No Cough, Wheezing, SOB, Hemoptysis, or Sputum production	  Cardiovascular: No SSCP, Palpitations, or Diaphoresis	  Gastrointestinal: No Nausea or Vomiting	  Genitourinary: No Hematuria or Dysuria  Musculoskeletal: See HPI  Neurological: No Seizures  Psychiatric: No Dementia or Depression  Hematology/Lymphatics: No hx of bleeding or Edema; No Blood-thinners	  Endocrine: No DM  Allergic/Immunologic: No Anaphylaxis, Intolerance, or Recent illnesses    MEDICATIONS  (Home):  Motrin PRN    ALLERGIES:  penicillin (Rash)    SOCIAL HISTORY:  No smoking, EtOH, or recreational drugs    FAMILY HISTORY:  Not contributory    Vital Signs Last 24 Hrs  T(C): 36.7 (23 Nov 2023 19:06), Max: 36.7 (23 Nov 2023 13:40)  T(F): 98 (23 Nov 2023 19:06), Max: 98.1 (23 Nov 2023 13:40)  HR: 75 (23 Nov 2023 19:06) (71 - 75)  BP: 116/98 (23 Nov 2023 19:06) (116/98 - 157/73)  RR: 16 (23 Nov 2023 19:06) (16 - 17)  SpO2: 100% (23 Nov 2023 19:06) (100% - 100%) RA    General: WN/WD NAD  Neurology: Awake, nonfocal, MORAN x 4  Eyes: Scleras clear, Gross vision intact  ENT: Gross hearing intact, grossly patent pharynx, no stridor  Neck: Neck supple, trachea midline, No JVD,   Respiratory: CTA B/L, No wheezing, rales, rhonchi  Chest: TTP over L ant/ lat ribs; No hematoma, No ecchymosis  CV: RRR, S1, S2, no murmurs  Abdominal: Soft, NT, ND   Extremities: No edema  Psych: Oriented x 3, normal affect    LABS:                        12.5   10.13 )-----------( 352      ( 23 Nov 2023 16:12 )             38.2     11-23    140  |  104  |  13  ----------------------------<  93  3.7   |  24  |  0.52    Ca    9.4      23 Nov 2023 16:12    TPro  7.6  /  Alb  4.2  /  TBili  <0.2  /  DBili  x   /  AST  19  /  ALT  11  /  AlkPhos  110  11-23    PT/INR - ( 23 Nov 2023 16:12 )   PT: 11.5 sec;   INR: 1.03 ratio    PTT - ( 23 Nov 2023 16:12 )  PTT:32.4 sec    Urinalysis Basic - ( 23 Nov 2023 16:12 )  Color: x / Appearance: x / SG: x / pH: x  Gluc: 93 mg/dL / Ketone: x  / Bili: x / Urobili: x   Blood: x / Protein: x / Nitrite: x   Leuk Esterase: x / RBC: x / WBC x   Sq Epi: x / Non Sq Epi: x / Bacteria: x    RADIOLOGY & ADDITIONAL STUDIES:  < from: CT Chest No Cont (11.23.23 @ 17:00) >  1.  Left lateral fourth through seventh rib fractures.  2.  No pneumothorax.  3.  Small pleural effusion likely of simple fluid density.  4.  Cholelithiasis.

## 2023-11-23 NOTE — ED PROVIDER NOTE - ATTENDING CONTRIBUTION TO CARE
The patient is a 89y Female who has a past medical and surgery history of   ,   PAST MEDICAL & SURGICAL HISTORY:   PTED with   Vital Signs PE: as described; my additions and exceptions are noted in the chart    DATA:  EKG: pending at time of evaluation  LAB: Pending at time of evaluation            IMPRESSION/RISK:  Dx=  Differential includes but not limited to conditions listed in order of most possible first:   Consideration include  Plan Resident HPI: 79yo female pt w no significant PMHx who presents to the ED for L chest wall pain s/p fall 1 week ago. Patient endorses she slipped and fell on her left side while walking at home. Unknown head trauma, no loc, and denies blood thinners. Patient endorses pain is worse with deep breathing, bending and palpations. Has taken motrin for pain control. Denies sob, n/v/d, fevers, cough"   Vital Signs PE: as described; my additions and exceptions are noted in the chart    DATA:  EKG: pending at time of evaluation  LAB: Pending at time of evaluation    IMPRESSION/RISK:  Dx= chest wall trauma persistent despite remote trauma   Consideration include: Will CT chest; no CNS s/s and no blood thinners but given age will CT head   Plan  as above  analgesics   Preops should w/u be +   reassess  dispo as per findings/response shared decision making

## 2023-11-23 NOTE — ED PROVIDER NOTE - PHYSICAL EXAMINATION
GENERAL: Awake, alert, NAD  HEENT: NC/AT, moist mucous membranes, EOMI  LUNGS: CTAB, no wheezes or crackles   CARDIAC: L anterior and lateral chest wall ttp  ABDOMEN: Soft, non tender, non distended, no rebound, no guarding  BACK: No midline spinal tenderness  EXT: No edema, no calf tenderness, no deformities.  NEURO: A&Ox3. Moving all extremities.  SKIN: Warm and dry. No rash.  PSYCH: Normal affect.

## 2023-11-23 NOTE — ED PROVIDER NOTE - OBJECTIVE STATEMENT
79yo female pt w no significant PMHx who presents to the ED for L chest wall pain s/p fall 1 week ago. Patient endorses she slipped and fell on her left side while walking at home. Unknown head trauma, no loc, and denies blood thinners. Patient endorses pain is worse with deep breathing, bending and palpations. Has taken motrin for pain control. Denies sob, n/v/d, fevers, cough 77yo female pt w no significant PMHx who presents to the ED for L chest wall pain s/p fall 1 week ago. Patient endorses she slipped and fell on her left side while walking at home. Unknown head trauma, no loc, and denies blood thinners. Patient endorses pain is worse with deep breathing, bending and palpations. Has taken motrin for pain control. Denies sob, n/v/d, fevers, cough

## 2023-11-23 NOTE — CONSULT NOTE ADULT - ASSESSMENT
ASSESSMENT:   Left 4th through 7th rib fx s/p fall 1 week ago.      PLAN:  No Thoracic surgery intervention  Recommend ER short stay for AM CXR to f/u the effusion  Pain control  Disposition  per ER

## 2023-11-24 ENCOUNTER — TRANSCRIPTION ENCOUNTER (OUTPATIENT)
Age: 78
End: 2023-11-24

## 2023-11-24 DIAGNOSIS — I65.22 OCCLUSION AND STENOSIS OF LEFT CAROTID ARTERY: ICD-10-CM

## 2023-11-24 DIAGNOSIS — Z29.9 ENCOUNTER FOR PROPHYLACTIC MEASURES, UNSPECIFIED: ICD-10-CM

## 2023-11-24 DIAGNOSIS — D32.9 BENIGN NEOPLASM OF MENINGES, UNSPECIFIED: ICD-10-CM
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
## 2023-11-24 DIAGNOSIS — S22.39XA FRACTURE OF ONE RIB, UNSPECIFIED SIDE, INITIAL ENCOUNTER FOR CLOSED FRACTURE: ICD-10-CM

## 2023-11-24 LAB
ALBUMIN SERPL ELPH-MCNC: 3.6 G/DL — SIGNIFICANT CHANGE UP (ref 3.3–5)
ALBUMIN SERPL ELPH-MCNC: 3.6 G/DL — SIGNIFICANT CHANGE UP (ref 3.3–5)
ALP SERPL-CCNC: 91 U/L — SIGNIFICANT CHANGE UP (ref 40–120)
ALP SERPL-CCNC: 91 U/L — SIGNIFICANT CHANGE UP (ref 40–120)
ALT FLD-CCNC: 10 U/L — SIGNIFICANT CHANGE UP (ref 4–33)
ALT FLD-CCNC: 10 U/L — SIGNIFICANT CHANGE UP (ref 4–33)
ANION GAP SERPL CALC-SCNC: 12 MMOL/L — SIGNIFICANT CHANGE UP (ref 7–14)
ANION GAP SERPL CALC-SCNC: 12 MMOL/L — SIGNIFICANT CHANGE UP (ref 7–14)
AST SERPL-CCNC: 15 U/L — SIGNIFICANT CHANGE UP (ref 4–32)
AST SERPL-CCNC: 15 U/L — SIGNIFICANT CHANGE UP (ref 4–32)
BASOPHILS # BLD AUTO: 0.08 K/UL — SIGNIFICANT CHANGE UP (ref 0–0.2)
BASOPHILS # BLD AUTO: 0.08 K/UL — SIGNIFICANT CHANGE UP (ref 0–0.2)
BASOPHILS NFR BLD AUTO: 1.1 % — SIGNIFICANT CHANGE UP (ref 0–2)
BASOPHILS NFR BLD AUTO: 1.1 % — SIGNIFICANT CHANGE UP (ref 0–2)
BILIRUB SERPL-MCNC: 0.3 MG/DL — SIGNIFICANT CHANGE UP (ref 0.2–1.2)
BILIRUB SERPL-MCNC: 0.3 MG/DL — SIGNIFICANT CHANGE UP (ref 0.2–1.2)
BUN SERPL-MCNC: 10 MG/DL — SIGNIFICANT CHANGE UP (ref 7–23)
BUN SERPL-MCNC: 10 MG/DL — SIGNIFICANT CHANGE UP (ref 7–23)
CALCIUM SERPL-MCNC: 8.7 MG/DL — SIGNIFICANT CHANGE UP (ref 8.4–10.5)
CALCIUM SERPL-MCNC: 8.7 MG/DL — SIGNIFICANT CHANGE UP (ref 8.4–10.5)
CHLORIDE SERPL-SCNC: 105 MMOL/L — SIGNIFICANT CHANGE UP (ref 98–107)
CHLORIDE SERPL-SCNC: 105 MMOL/L — SIGNIFICANT CHANGE UP (ref 98–107)
CO2 SERPL-SCNC: 23 MMOL/L — SIGNIFICANT CHANGE UP (ref 22–31)
CO2 SERPL-SCNC: 23 MMOL/L — SIGNIFICANT CHANGE UP (ref 22–31)
CREAT SERPL-MCNC: 0.59 MG/DL — SIGNIFICANT CHANGE UP (ref 0.5–1.3)
CREAT SERPL-MCNC: 0.59 MG/DL — SIGNIFICANT CHANGE UP (ref 0.5–1.3)
EGFR: 93 ML/MIN/1.73M2 — SIGNIFICANT CHANGE UP
EGFR: 93 ML/MIN/1.73M2 — SIGNIFICANT CHANGE UP
EOSINOPHIL # BLD AUTO: 0.25 K/UL — SIGNIFICANT CHANGE UP (ref 0–0.5)
EOSINOPHIL # BLD AUTO: 0.25 K/UL — SIGNIFICANT CHANGE UP (ref 0–0.5)
EOSINOPHIL NFR BLD AUTO: 3.4 % — SIGNIFICANT CHANGE UP (ref 0–6)
EOSINOPHIL NFR BLD AUTO: 3.4 % — SIGNIFICANT CHANGE UP (ref 0–6)
GLUCOSE BLDC GLUCOMTR-MCNC: 83 MG/DL — SIGNIFICANT CHANGE UP (ref 70–99)
GLUCOSE BLDC GLUCOMTR-MCNC: 83 MG/DL — SIGNIFICANT CHANGE UP (ref 70–99)
GLUCOSE SERPL-MCNC: 72 MG/DL — SIGNIFICANT CHANGE UP (ref 70–99)
GLUCOSE SERPL-MCNC: 72 MG/DL — SIGNIFICANT CHANGE UP (ref 70–99)
HCT VFR BLD CALC: 35.3 % — SIGNIFICANT CHANGE UP (ref 34.5–45)
HCT VFR BLD CALC: 35.3 % — SIGNIFICANT CHANGE UP (ref 34.5–45)
HCV AB S/CO SERPL IA: 0.04 S/CO — SIGNIFICANT CHANGE UP (ref 0–0.99)
HCV AB S/CO SERPL IA: 0.04 S/CO — SIGNIFICANT CHANGE UP (ref 0–0.99)
HCV AB SERPL-IMP: SIGNIFICANT CHANGE UP
HCV AB SERPL-IMP: SIGNIFICANT CHANGE UP
HGB BLD-MCNC: 11.8 G/DL — SIGNIFICANT CHANGE UP (ref 11.5–15.5)
HGB BLD-MCNC: 11.8 G/DL — SIGNIFICANT CHANGE UP (ref 11.5–15.5)
IANC: 4.11 K/UL — SIGNIFICANT CHANGE UP (ref 1.8–7.4)
IANC: 4.11 K/UL — SIGNIFICANT CHANGE UP (ref 1.8–7.4)
IMM GRANULOCYTES NFR BLD AUTO: 0.1 % — SIGNIFICANT CHANGE UP (ref 0–0.9)
IMM GRANULOCYTES NFR BLD AUTO: 0.1 % — SIGNIFICANT CHANGE UP (ref 0–0.9)
LYMPHOCYTES # BLD AUTO: 2.21 K/UL — SIGNIFICANT CHANGE UP (ref 1–3.3)
LYMPHOCYTES # BLD AUTO: 2.21 K/UL — SIGNIFICANT CHANGE UP (ref 1–3.3)
LYMPHOCYTES # BLD AUTO: 29.8 % — SIGNIFICANT CHANGE UP (ref 13–44)
LYMPHOCYTES # BLD AUTO: 29.8 % — SIGNIFICANT CHANGE UP (ref 13–44)
MCHC RBC-ENTMCNC: 26.8 PG — LOW (ref 27–34)
MCHC RBC-ENTMCNC: 26.8 PG — LOW (ref 27–34)
MCHC RBC-ENTMCNC: 33.4 GM/DL — SIGNIFICANT CHANGE UP (ref 32–36)
MCHC RBC-ENTMCNC: 33.4 GM/DL — SIGNIFICANT CHANGE UP (ref 32–36)
MCV RBC AUTO: 80 FL — SIGNIFICANT CHANGE UP (ref 80–100)
MCV RBC AUTO: 80 FL — SIGNIFICANT CHANGE UP (ref 80–100)
MONOCYTES # BLD AUTO: 0.75 K/UL — SIGNIFICANT CHANGE UP (ref 0–0.9)
MONOCYTES # BLD AUTO: 0.75 K/UL — SIGNIFICANT CHANGE UP (ref 0–0.9)
MONOCYTES NFR BLD AUTO: 10.1 % — SIGNIFICANT CHANGE UP (ref 2–14)
MONOCYTES NFR BLD AUTO: 10.1 % — SIGNIFICANT CHANGE UP (ref 2–14)
NEUTROPHILS # BLD AUTO: 4.11 K/UL — SIGNIFICANT CHANGE UP (ref 1.8–7.4)
NEUTROPHILS # BLD AUTO: 4.11 K/UL — SIGNIFICANT CHANGE UP (ref 1.8–7.4)
NEUTROPHILS NFR BLD AUTO: 55.5 % — SIGNIFICANT CHANGE UP (ref 43–77)
NEUTROPHILS NFR BLD AUTO: 55.5 % — SIGNIFICANT CHANGE UP (ref 43–77)
NRBC # BLD: 0 /100 WBCS — SIGNIFICANT CHANGE UP (ref 0–0)
NRBC # BLD: 0 /100 WBCS — SIGNIFICANT CHANGE UP (ref 0–0)
NRBC # FLD: 0 K/UL — SIGNIFICANT CHANGE UP (ref 0–0)
NRBC # FLD: 0 K/UL — SIGNIFICANT CHANGE UP (ref 0–0)
PLATELET # BLD AUTO: 323 K/UL — SIGNIFICANT CHANGE UP (ref 150–400)
PLATELET # BLD AUTO: 323 K/UL — SIGNIFICANT CHANGE UP (ref 150–400)
POTASSIUM SERPL-MCNC: 3.7 MMOL/L — SIGNIFICANT CHANGE UP (ref 3.5–5.3)
POTASSIUM SERPL-MCNC: 3.7 MMOL/L — SIGNIFICANT CHANGE UP (ref 3.5–5.3)
POTASSIUM SERPL-SCNC: 3.7 MMOL/L — SIGNIFICANT CHANGE UP (ref 3.5–5.3)
POTASSIUM SERPL-SCNC: 3.7 MMOL/L — SIGNIFICANT CHANGE UP (ref 3.5–5.3)
PROT SERPL-MCNC: 6.4 G/DL — SIGNIFICANT CHANGE UP (ref 6–8.3)
PROT SERPL-MCNC: 6.4 G/DL — SIGNIFICANT CHANGE UP (ref 6–8.3)
RBC # BLD: 4.41 M/UL — SIGNIFICANT CHANGE UP (ref 3.8–5.2)
RBC # BLD: 4.41 M/UL — SIGNIFICANT CHANGE UP (ref 3.8–5.2)
RBC # FLD: 14.1 % — SIGNIFICANT CHANGE UP (ref 10.3–14.5)
RBC # FLD: 14.1 % — SIGNIFICANT CHANGE UP (ref 10.3–14.5)
SODIUM SERPL-SCNC: 140 MMOL/L — SIGNIFICANT CHANGE UP (ref 135–145)
SODIUM SERPL-SCNC: 140 MMOL/L — SIGNIFICANT CHANGE UP (ref 135–145)
WBC # BLD: 7.41 K/UL — SIGNIFICANT CHANGE UP (ref 3.8–10.5)
WBC # BLD: 7.41 K/UL — SIGNIFICANT CHANGE UP (ref 3.8–10.5)
WBC # FLD AUTO: 7.41 K/UL — SIGNIFICANT CHANGE UP (ref 3.8–10.5)
WBC # FLD AUTO: 7.41 K/UL — SIGNIFICANT CHANGE UP (ref 3.8–10.5)

## 2023-11-24 PROCEDURE — 99223 1ST HOSP IP/OBS HIGH 75: CPT | Mod: GC

## 2023-11-24 PROCEDURE — 93306 TTE W/DOPPLER COMPLETE: CPT | Mod: 26

## 2023-11-24 PROCEDURE — 99223 1ST HOSP IP/OBS HIGH 75: CPT

## 2023-11-24 RX ORDER — INFLUENZA VIRUS VACCINE 15; 15; 15; 15 UG/.5ML; UG/.5ML; UG/.5ML; UG/.5ML
0.7 SUSPENSION INTRAMUSCULAR ONCE
Refills: 0 | Status: DISCONTINUED | OUTPATIENT
Start: 2023-11-24 | End: 2023-11-29

## 2023-11-24 RX ORDER — OXYCODONE HYDROCHLORIDE 5 MG/1
2.5 TABLET ORAL EVERY 6 HOURS
Refills: 0 | Status: DISCONTINUED | OUTPATIENT
Start: 2023-11-24 | End: 2023-11-29

## 2023-11-24 RX ORDER — OXYCODONE HYDROCHLORIDE 5 MG/1
5 TABLET ORAL EVERY 6 HOURS
Refills: 0 | Status: DISCONTINUED | OUTPATIENT
Start: 2023-11-24 | End: 2023-11-29

## 2023-11-24 RX ORDER — ENOXAPARIN SODIUM 100 MG/ML
40 INJECTION SUBCUTANEOUS EVERY 24 HOURS
Refills: 0 | Status: DISCONTINUED | OUTPATIENT
Start: 2023-11-24 | End: 2023-11-24

## 2023-11-24 RX ORDER — MECLIZINE HCL 12.5 MG
25 TABLET ORAL EVERY 8 HOURS
Refills: 0 | Status: DISCONTINUED | OUTPATIENT
Start: 2023-11-24 | End: 2023-11-29

## 2023-11-24 RX ORDER — ACETAMINOPHEN 500 MG
650 TABLET ORAL EVERY 6 HOURS
Refills: 0 | Status: DISCONTINUED | OUTPATIENT
Start: 2023-11-24 | End: 2023-11-29

## 2023-11-24 RX ORDER — ENOXAPARIN SODIUM 100 MG/ML
40 INJECTION SUBCUTANEOUS EVERY 24 HOURS
Refills: 0 | Status: DISCONTINUED | OUTPATIENT
Start: 2023-11-24 | End: 2023-11-29

## 2023-11-24 RX ADMIN — Medication 650 MILLIGRAM(S): at 11:21

## 2023-11-24 RX ADMIN — Medication 650 MILLIGRAM(S): at 10:21

## 2023-11-24 RX ADMIN — Medication 25 MILLIGRAM(S): at 00:34

## 2023-11-24 RX ADMIN — OXYCODONE HYDROCHLORIDE 5 MILLIGRAM(S): 5 TABLET ORAL at 17:44

## 2023-11-24 RX ADMIN — OXYCODONE HYDROCHLORIDE 5 MILLIGRAM(S): 5 TABLET ORAL at 16:44

## 2023-11-24 NOTE — CONSULT NOTE ADULT - TIME BILLING
Abdomen , soft, TTP in epigastric region, scar from surgery noted nondistended , no guarding or rigidity , no masses palpable , normal bowel sounds , Liver and Spleen , no hepatomegaly present , no hepatosplenomegaly , liver nontender , spleen not palpable  I interviewed the patient, discussed the case with the Resident and agree with the findings and plan as documented in the Resident's note except below.  Ms. 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 Neurology consulted for dizziness.  Non Focal exam except left lateral gaze palsy at baseline.  Etiology of dizziness is most likely due to the peripheral etiology.  Also patient would benefit from normal saline 100 cc per hours for 24 to 48 hours, meclizine 12.5 mg TID.  Vestibular rehab if symptoms persist.  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   Fall precaution were discussed.  Continue medical management, neuro- check.  GI and DVT prophylaxis.  I discussed the diagnosis and treatment plan with the patient. All questions and concerns were addressed. The patient demonstrated good understanding of the treatment plan.  If you have any further questions, please do not hesitate to contact our team.  Thank you for allowing us to participate in this patient care.

## 2023-11-24 NOTE — PROGRESS NOTE ADULT - SUBJECTIVE AND OBJECTIVE BOX
***************************************************************  Geoff Travis, PG1  Internal Medicine   Pager:  TEAMS  ***************************************************************    KIM GARRISON  78y  MRN: [id number]    Patient is a 78y old  Female who presents with a chief complaint of Fall (24 Nov 2023 06:56)      Interval/Overnight Events: no events ON.     Subjective: Pt seen and examined at bedside. Denies fever, HA CP, SOB, abn pain, N/V, dysuria. Feels well.  Reports some chronic blurry vision and difficulty moving her left eye.  Reports that her rib pain is improved with her pain medications    MEDICATIONS  (STANDING):  enoxaparin Injectable 40 milliGRAM(s) SubCutaneous every 24 hours  influenza  Vaccine (HIGH DOSE) 0.7 milliLiter(s) IntraMuscular once    MEDICATIONS  (PRN):  acetaminophen     Tablet .. 650 milliGRAM(s) Oral every 6 hours PRN Mild Pain (1 - 3)  meclizine 25 milliGRAM(s) Oral every 8 hours PRN Dizziness  melatonin 3 milliGRAM(s) Oral at bedtime PRN Insomnia  oxyCODONE    IR 2.5 milliGRAM(s) Oral every 6 hours PRN Moderate Pain (4 - 6)  oxyCODONE    IR 5 milliGRAM(s) Oral every 6 hours PRN Severe Pain (7 - 10)      Objective:    Vitals: Vital Signs Last 24 Hrs  T(C): 36.6 (11-24-23 @ 10:10), Max: 37.1 (11-24-23 @ 00:23)  T(F): 97.8 (11-24-23 @ 10:10), Max: 98.7 (11-24-23 @ 00:23)  HR: 73 (11-24-23 @ 10:10) (61 - 75)  BP: 137/63 (11-24-23 @ 10:10) (116/98 - 157/73)  BP(mean): --  RR: 18 (11-24-23 @ 10:10) (16 - 18)  SpO2: 99% (11-24-23 @ 10:10) (97% - 100%)                I&O's Summary      PHYSICAL EXAM:  GENERAL: NAD  HEAD:  Atraumatic, Normocephalic  EYES: EOMI, conjunctiva and sclera clear  CHEST/LUNG: Clear to auscultation bilaterally; No rales, rhonchi, wheezing, or rubs  HEART: Regular rate and rhythm; No murmurs, rubs, or gallops  ABDOMEN: Soft, Nontender, Nondistended;   SKIN: No rashes or lesions  NERVOUS SYSTEM:  Alert & Oriented X3, Muscle strength 5/5 throughout, sensation intact, reflexes 2+ throughout, CNII-XII grossly intact    LABS:                        11.8   7.41  )-----------( 323      ( 24 Nov 2023 05:15 )             35.3                         12.5   10.13 )-----------( 352      ( 23 Nov 2023 16:12 )             38.2     11-24    140  |  105  |  10  ----------------------------<  72  3.7   |  23  |  0.59  11-23    140  |  104  |  13  ----------------------------<  93  3.7   |  24  |  0.52    Ca    8.7      24 Nov 2023 05:15  Ca    9.4      23 Nov 2023 16:12    TPro  6.4  /  Alb  3.6  /  TBili  0.3  /  DBili  x   /  AST  15  /  ALT  10  /  AlkPhos  91  11-24  TPro  7.6  /  Alb  4.2  /  TBili  <0.2  /  DBili  x   /  AST  19  /  ALT  11  /  AlkPhos  110  11-23    CAPILLARY BLOOD GLUCOSE      POCT Blood Glucose.: 83 mg/dL (24 Nov 2023 12:07)    PT/INR - ( 23 Nov 2023 16:12 )   PT: 11.5 sec;   INR: 1.03 ratio         PTT - ( 23 Nov 2023 16:12 )  PTT:32.4 sec    Urinalysis Basic - ( 24 Nov 2023 05:15 )    Color: x / Appearance: x / SG: x / pH: x  Gluc: 72 mg/dL / Ketone: x  / Bili: x / Urobili: x   Blood: x / Protein: x / Nitrite: x   Leuk Esterase: x / RBC: x / WBC x   Sq Epi: x / Non Sq Epi: x / Bacteria: x          RADIOLOGY & ADDITIONAL TESTS:    Imaging Personally Reviewed:  [x ] YES  [ ] NO    Consultants involved in case:   Consultant(s) Notes Reviewed:  [ x] YES  [ ] NO:   Care Discussed with Consultants/Other Providers [x ] YES  [ ] NO         ***************************************************************  Geoff Travis, PG1  Internal Medicine   Pager:  TEAMS  ***************************************************************    KIM GARRISON  78y  MRN: [id number]    Patient is a 78y old  Female who presents with a chief complaint of Fall (24 Nov 2023 06:56)      Interval/Overnight Events: no events ON.     Subjective: Pt seen and examined at bedside. Denies fever, HA CP, SOB, abn pain, N/V, dysuria. Feels well.  Reports some chronic blurry vision and difficulty moving her left eye.  Reports that her rib pain is improved with her pain medications    MEDICATIONS  (STANDING):  enoxaparin Injectable 40 milliGRAM(s) SubCutaneous every 24 hours  influenza  Vaccine (HIGH DOSE) 0.7 milliLiter(s) IntraMuscular once    MEDICATIONS  (PRN):  acetaminophen     Tablet .. 650 milliGRAM(s) Oral every 6 hours PRN Mild Pain (1 - 3)  meclizine 25 milliGRAM(s) Oral every 8 hours PRN Dizziness  melatonin 3 milliGRAM(s) Oral at bedtime PRN Insomnia  oxyCODONE    IR 2.5 milliGRAM(s) Oral every 6 hours PRN Moderate Pain (4 - 6)  oxyCODONE    IR 5 milliGRAM(s) Oral every 6 hours PRN Severe Pain (7 - 10)      Objective:    Vitals: Vital Signs Last 24 Hrs  T(C): 36.6 (11-24-23 @ 10:10), Max: 37.1 (11-24-23 @ 00:23)  T(F): 97.8 (11-24-23 @ 10:10), Max: 98.7 (11-24-23 @ 00:23)  HR: 73 (11-24-23 @ 10:10) (61 - 75)  BP: 137/63 (11-24-23 @ 10:10) (116/98 - 157/73)  BP(mean): --  RR: 18 (11-24-23 @ 10:10) (16 - 18)  SpO2: 99% (11-24-23 @ 10:10) (97% - 100%)                I&O's Summary      PHYSICAL EXAM:  GENERAL: NAD  HEAD:  Atraumatic, Normocephalic  EYES: EOMI, conjunctiva and sclera clear  CHEST/LUNG: Clear to auscultation bilaterally; No rales, rhonchi, wheezing, or rubs  HEART: Regular rate and rhythm; No murmurs, rubs, or gallops  ABDOMEN: Soft, Nontender, Nondistended;   SKIN: No rashes or lesions  NERVOUS SYSTEM:  Alert & Oriented X3, Muscle strength 5/5 throughout, sensation intact, reflexes 2+ throughout, CNII-XII grossly intact    LABS:                        11.8   7.41  )-----------( 323      ( 24 Nov 2023 05:15 )             35.3                         12.5   10.13 )-----------( 352      ( 23 Nov 2023 16:12 )             38.2     11-24    140  |  105  |  10  ----------------------------<  72  3.7   |  23  |  0.59  11-23    140  |  104  |  13  ----------------------------<  93  3.7   |  24  |  0.52    Ca    8.7      24 Nov 2023 05:15  Ca    9.4      23 Nov 2023 16:12    TPro  6.4  /  Alb  3.6  /  TBili  0.3  /  DBili  x   /  AST  15  /  ALT  10  /  AlkPhos  91  11-24  TPro  7.6  /  Alb  4.2  /  TBili  <0.2  /  DBili  x   /  AST  19  /  ALT  11  /  AlkPhos  110  11-23    CAPILLARY BLOOD GLUCOSE      POCT Blood Glucose.: 83 mg/dL (24 Nov 2023 12:07)    PT/INR - ( 23 Nov 2023 16:12 )   PT: 11.5 sec;   INR: 1.03 ratio         PTT - ( 23 Nov 2023 16:12 )  PTT:32.4 sec    Urinalysis Basic - ( 24 Nov 2023 05:15 )    Color: x / Appearance: x / SG: x / pH: x  Gluc: 72 mg/dL / Ketone: x  / Bili: x / Urobili: x   Blood: x / Protein: x / Nitrite: x   Leuk Esterase: x / RBC: x / WBC x   Sq Epi: x / Non Sq Epi: x / Bacteria: x          RADIOLOGY & ADDITIONAL TESTS:    Imaging Personally Reviewed:  [x ] YES  [ ] NO    Consultants involved in case:   Consultant(s) Notes Reviewed:  [ x] YES  [ ] NO:   Care Discussed with Consultants/Other Providers [x ] YES  [ ] NO         ***************************************************************  Geoff Travis, PG1  Internal Medicine   Pager:  TEAMS  ***************************************************************    KIM GARRISON  78y  MRN: [id number]    Patient is a 78y old  Female who presents with a chief complaint of Fall (24 Nov 2023 06:56)      Interval/Overnight Events: no events ON.     Subjective: Pt seen and examined at bedside. Denies fever, HA CP, SOB, abn pain, N/V, dysuria. Feels well.  Reports some chronic blurry vision and difficulty moving her left eye.  Reports that her rib pain is improved with her pain medications    MEDICATIONS  (STANDING):  enoxaparin Injectable 40 milliGRAM(s) SubCutaneous every 24 hours  influenza  Vaccine (HIGH DOSE) 0.7 milliLiter(s) IntraMuscular once    MEDICATIONS  (PRN):  acetaminophen     Tablet .. 650 milliGRAM(s) Oral every 6 hours PRN Mild Pain (1 - 3)  meclizine 25 milliGRAM(s) Oral every 8 hours PRN Dizziness  melatonin 3 milliGRAM(s) Oral at bedtime PRN Insomnia  oxyCODONE    IR 2.5 milliGRAM(s) Oral every 6 hours PRN Moderate Pain (4 - 6)  oxyCODONE    IR 5 milliGRAM(s) Oral every 6 hours PRN Severe Pain (7 - 10)      Objective:    Vitals: Vital Signs Last 24 Hrs  T(C): 36.6 (11-24-23 @ 10:10), Max: 37.1 (11-24-23 @ 00:23)  T(F): 97.8 (11-24-23 @ 10:10), Max: 98.7 (11-24-23 @ 00:23)  HR: 73 (11-24-23 @ 10:10) (61 - 75)  BP: 137/63 (11-24-23 @ 10:10) (116/98 - 157/73)  BP(mean): --  RR: 18 (11-24-23 @ 10:10) (16 - 18)  SpO2: 99% (11-24-23 @ 10:10) (97% - 100%)                I&O's Summary      PHYSICAL EXAM:  GENERAL: NAD  HEAD:  Atraumatic, Normocephalic  EYES: EOMI, conjunctiva and sclera clear  CHEST/LUNG: Clear to auscultation bilaterally; No rales, rhonchi, wheezing, or rubs  HEART: Regular rate and rhythm; No murmurs, rubs, or gallops  ABDOMEN: Soft, Nontender, Nondistended;   SKIN: No rashes or lesions  NERVOUS SYSTEM:  Alert & Oriented X3, Muscle strength 5/5 throughout, sensation intact, reflexes 2+ throughout, CNII-XII grossly intact    LABS:                        11.8   7.41  )-----------( 323      ( 24 Nov 2023 05:15 )             35.3                         12.5   10.13 )-----------( 352      ( 23 Nov 2023 16:12 )             38.2     11-24    140  |  105  |  10  ----------------------------<  72  3.7   |  23  |  0.59  11-23    140  |  104  |  13  ----------------------------<  93  3.7   |  24  |  0.52    Ca    8.7      24 Nov 2023 05:15  Ca    9.4      23 Nov 2023 16:12    TPro  6.4  /  Alb  3.6  /  TBili  0.3  /  DBili  x   /  AST  15  /  ALT  10  /  AlkPhos  91  11-24  TPro  7.6  /  Alb  4.2  /  TBili  <0.2  /  DBili  x   /  AST  19  /  ALT  11  /  AlkPhos  110  11-23    CAPILLARY BLOOD GLUCOSE      POCT Blood Glucose.: 83 mg/dL (24 Nov 2023 12:07)    PT/INR - ( 23 Nov 2023 16:12 )   PT: 11.5 sec;   INR: 1.03 ratio         PTT - ( 23 Nov 2023 16:12 )  PTT:32.4 sec    Urinalysis Basic - ( 24 Nov 2023 05:15 )    Color: x / Appearance: x / SG: x / pH: x  Gluc: 72 mg/dL / Ketone: x  / Bili: x / Urobili: x   Blood: x / Protein: x / Nitrite: x   Leuk Esterase: x / RBC: x / WBC x   Sq Epi: x / Non Sq Epi: x / Bacteria: x          RADIOLOGY & ADDITIONAL TESTS:    Imaging Personally Reviewed:  [x ] YES  [ ] NO    Consultants involved in case:   Consultant(s) Notes Reviewed:  [ x] YES  [ ] NO:   Care Discussed with Consultants/Other Providers [x ] YES  [ ] NO

## 2023-11-24 NOTE — H&P ADULT - PROBLEM SELECTOR PLAN 2
- Left 4-7th rib fractures with associated pain and shallower breaths. CT Chest pertinent for bilateral lower lobe, right middle lobe, and lingular linear atelectasis  - Incentive spirometry explained and encouraged  - Pain control: Tylenol q4 for 1-3 pain - Left 4-7th rib fractures with associated pain and shallower breaths. CT Chest pertinent for bilateral lower lobe, right middle lobe, and lingular linear atelectasis  - Incentive spirometry explained and encouraged  - Pain control: Tylenol q4 PRN for 1-3 pain, Oxycodone IR 2.5 PRN for 4-7 pain, Oxycodone IR 5 for 8-10 - Left 4-7th rib fractures with associated pain and shallower breaths. CT Chest pertinent for bilateral lower lobe, right middle lobe, and lingular linear atelectasis  - Incentive spirometry explained and encouraged  - Pain control: Tylenol q6 PRN for 1-3 pain, Oxycodone IR 2.5 PRN for 4-7 pain, Oxycodone IR 5 for 8-10 - Per patient story, patient slipped on the ground and fell. Denies dizziness, chest palpitations, or LOC at the time.   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 Denies dizziness, chest palpitations, or LOC at the time.   - Suspect mechanical fall, though can be in setting of orthostatic hypotension  - EKG with no arrhythmia noted  - Monitor on tele  - TTE ordered to r/o structural heart disease  - F/u MRI/MRA brain w/wo contrast given left ICA occlusion that could have contributed to fall  - Check orthostatics  - Fall risk protocol  - PT consult

## 2023-11-24 NOTE — PATIENT PROFILE ADULT - FALL HARM RISK - HARM RISK INTERVENTIONS
Assistance with ambulation/Assistance OOB with selected safe patient handling equipment/Communicate Risk of Fall with Harm to all staff/Reinforce activity limits and safety measures with patient and family/Tailored Fall Risk Interventions/Visual Cue: Yellow wristband and red socks/Bed in lowest position, wheels locked, appropriate side rails in place/Call bell, personal items and telephone in reach/Instruct patient to call for assistance before getting out of bed or chair/Non-slip footwear when patient is out of bed/[place] to call system/Physically safe environment - no spills, clutter or unnecessary equipment/Purposeful Proactive Rounding/Room/bathroom lighting operational, light cord in reach Assistance with ambulation/Assistance OOB with selected safe patient handling equipment/Communicate Risk of Fall with Harm to all staff/Reinforce activity limits and safety measures with patient and family/Tailored Fall Risk Interventions/Visual Cue: Yellow wristband and red socks/Bed in lowest position, wheels locked, appropriate side rails in place/Call bell, personal items and telephone in reach/Instruct patient to call for assistance before getting out of bed or chair/Non-slip footwear when patient is out of bed/[place] to call system/Physically safe environment - no spills, clutter or unnecessary equipment/Purposeful Proactive Rounding/Room/bathroom lighting operational, light cord in reach Assistance with ambulation/Assistance OOB with selected safe patient handling equipment/Communicate Risk of Fall with Harm to all staff/Reinforce activity limits and safety measures with patient and family/Tailored Fall Risk Interventions/Visual Cue: Yellow wristband and red socks/Bed in lowest position, wheels locked, appropriate side rails in place/Call bell, personal items and telephone in reach/Instruct patient to call for assistance before getting out of bed or chair/Non-slip footwear when patient is out of bed/[place] to call system/Physically safe environment - no spills, clutter or unnecessary equipment/Purposeful Proactive Rounding/Room/bathroom lighting operational, light cord in reach

## 2023-11-24 NOTE — DISCHARGE NOTE PROVIDER - CARE PROVIDERS DIRECT ADDRESSES
,[email or web address] ,[email or web address] ,[email or web address] ,[email or web address],DirectAddress_Unknown,[email or web address] ,[email or web address],DirectAddress_Unknown,[email or web address] ,[email or web address],DirectAddress_Unknown,[email or web address]

## 2023-11-24 NOTE — DISCHARGE NOTE PROVIDER - CARE PROVIDER_API CALL
Danny Person  Thoracic Surgery  [address] [place] [address] [place] [address]  Phone: [phone]  Fax: [phone]  Follow Up Time: 1 month   Danny Person  Thoracic Surgery  [address] [place] [address] [place] [address]  Phone: [phone]  Fax: [phone]  Follow Up Time: 1 month   Danny Person  Thoracic Surgery  [address] [place] [address] [place] [address]  Phone: [phone]  Fax: [phone]  Follow Up Time: 1 month   Danny Person  Thoracic Surgery  [address] [place] [address], [place] [address]  Phone: [phone]  Fax: [phone]  Follow Up Time: 1 month    Sofi Quintanilla NP in [address], [address]  [place] [address]  Phone: [phone]  Fax: [phone]  Established Patient  Follow Up Time: 1 week    Parish Jackson)  Neurosurgery  [address] [place] [address]  Phone: [phone]  Fax: [phone]  Established Patient  Follow Up Time: 1 week   Danny Person  Thoracic Surgery  [address] [place] [address], [place] [address]  Phone: [phone]  Fax: [phone]  Follow Up Time: 1 month    Sofi Quintanilla NP in [address], [address]  [place] [address]  Phone: [phone]  Fax: [phone]  Established Patient  Follow Up Time: 1 week    Parish Jackson)  Neurosurgery  [address] [place] [address]  Phone: [phone]  Fax: [phone]  Established Patient  Follow Up Time: 1 week   Danny Person  Thoracic Surgery  [address] [place] [address], [place] [address]  Phone: [phone]  Fax: [phone]  Follow Up Time: 1 month    Sofi Quintanilla NP in [address], [address]  [place] [address]  Phone: [phone]  Fax: [phone]  Established Patient  Follow Up Time: 1 week    Parish Jackson)  Neurosurgery  [address] [place] [address]  Phone: [phone]  Fax: [phone]  Established Patient  Follow Up Time: 1 week

## 2023-11-24 NOTE — PROGRESS NOTE ADULT - PROBLEM SELECTOR PLAN 2
- Left 4-7th rib fractures with associated pain and shallower breaths. CT Chest pertinent for bilateral lower lobe, right middle lobe, and lingular linear atelectasis  - Incentive spirometry explained and encouraged  - Pain control: Tylenol q6 PRN for 1-3 pain, Oxycodone IR 2.5 PRN for 4-7 pain, Oxycodone IR 5 for 8-10 Left 4-7th rib fractures with associated pain and shallower breaths. CT Chest pertinent for bilateral lower lobe, right middle lobe, and lingular linear atelectasis.    - Thoracic Surgery consulted, no acute interventions at this time  - Incentive spirometry explained and encouraged  - Pain control: Tylenol q6 PRN for 1-3 pain, Oxycodone IR 2.5 PRN for 4-7 pain, Oxycodone IR 5 for 8-10 Left 4-7th rib fractures with associated pain and shallower breaths. CT Chest pertinent for bilateral lower lobe, right middle lobe, and lingular linear atelectasis.    - Thoracic Surgery consulted, no acute interventions at this time  - Incentive spirometry explained and encouraged  - Pain control: Tylenol q6 PRN for 1-3 pain, Oxycodone IR 2.5 PRN for 4-7 pain, Oxycodone IR 5 for 8-10  - F/u with Dr Danny Person in 3-4 weeks with repeat CXR

## 2023-11-24 NOTE — CONSULT NOTE ADULT - SUBJECTIVE AND OBJECTIVE BOX
78yF PMHx meningioma (diagnosed in 2016) presenting 1 week after a fall. Patient reports that she was in the kitchen wearing slippers when she turned and slipped and fell on her left side. She denies LOC, palpitations, dizziness, n/v prior or after the fall. She tried to find a piece of furniture to raise herself and was able to ambulate afterwards. Patient was pain free until 11/22 when she started having left-sided chest pain that worsened to 10/10 pain on 11/23 after a shower that prompted her to come to the ED.    Patient was found to have a meningioma on imaging in 2016 but it was not acted on until July 2023 when it was noted to have increased in size. Patient finished radiation treatment on August 2023 and had an MRI done 10/31 which showed a stable meningioma with slight reduction (per patient's son, no official report given). Outpatient records show a MRI done in 2019 which showed a 3.0 x 2.9 x 2.4 cm dural based mass in the left anterior middle cranial fossa, which invades into the left cavernous sinus with encasement of the left cavernous internal carotid artery, with marked narrowing of the vascular flow-voids.     Patient was evaluated by Dr Jackson, per patient she was not a candidate for operative intervention due to location of the tumor and was referred for radiosurgery. Patient states she has had blurred vision in her left eye from the time of her previous neurosurgical evaluation.    WDWN female in NAD  Vital Signs Last 24 Hrs  T(C): 36.3 (24 Nov 2023 01:00), Max: 37.1 (24 Nov 2023 00:23)  T(F): 97.3 (24 Nov 2023 01:00), Max: 98.7 (24 Nov 2023 00:23)  HR: 61 (24 Nov 2023 01:00) (61 - 75)  BP: 127/61 (24 Nov 2023 01:00) (116/98 - 157/73)  BP(mean): --  RR: 17 (24 Nov 2023 01:00) (16 - 17)  SpO2: 99% (24 Nov 2023 01:00) (97% - 100%)    Parameters below as of 24 Nov 2023 01:00  Patient On (Oxygen Delivery Method): room air    AAO X 3  PERRLA, EOMI  CN 2-12 grossly intact  MORAN strength 5/5   SILT    < from: CT Angio Head w/ IV Cont (11.23.23 @ 22:18) >  FINDINGS:    CTA BRAIN    INTERNAL CAROTID ARTERIES: Asymmetrically diminished flow throughout the   intracranial left ICA, with string sign/near occlusion throughout the   cavernous, clinoid and supraclinoid levels, with reconstitution in the   communicating segment. The ICA bifurcations are unremarkable.    ANTERIOR CEREBRAL ARTERIES: No proximal flow-limiting stenosis or   occlusion. Anterior communicating artery is unremarkable without aneurysm.    MIDDLE CEREBRAL ARTERIES: No proximal flow-limiting stenosis or   occlusion. MCA bifurcations are unremarkable without aneurysm.    POSTERIOR CEREBRAL ARTERIES: No flow-limiting stenosis or occlusion.   Posterior communicating artery is well-developed on the left.    VERTEBROBASILAR SYSTEM: No flow-limiting stenosis or occlusion. Basilar   tip is unremarkable.    Miscellaneous: Homogeneously enhancing dural based mass measures 2.9 x   2.5 cm, obscures the cavernous sinus, extends into the sella and   suprasellar cistern and also appears to involve the proximal left optic   nerve sheath via the supraorbital foramen.    CTA NECK    RIGHT CAROTID SYSTEM: Normal in course and caliber without flow-limiting   stenosis or occlusion.    LEFT CAROTID SYSTEM: Normal in course and caliber without flow-limiting   stenosis or occlusion. Gradual diminished enhancement of the left   internal carotid artery beginning at the level of C2.    VERTEBRAL SYSTEM:  Normal in course and caliber  without flow-limiting   stenosis or occlusion. Origin of the vertebral arteries are unremarkable.    AORTIC ARCH: Origin of the great vessels are unremarkable.    IMPRESSION:    CTA BRAIN:  1. 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  2. 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 This could be related to extrinsic mass effect from the dural   based mass in the left middle cranial fossa and/or parasitized flow to   the mass.    An MRI brain with and without contrast with MRA is recommended.      CTA NECK: No flow-limiting stenosis or occlusion. Gradual diminished   enhancement of the left internal carotid artery beginning at the C2 level   and extending cranially.    NASCET CAROTID STENOSIS CRITERIA (distal normal appearing ICA as   denominator for measurement): 0%-none, 1-49%-mild, 50-70%-moderate,   70-89%-severe, 90-99%-critical.    < end of copied text >     EXAM:  MR BRAIN WAW IC                            *** ADDENDUM 07/26/2019  ***    An outside brain MRI study from 05/16/2017 was submitted for comparison.   The meningioma involving the left cavernous sinus, left sella,   suprasellar cistern, and left middle cranial fossa is grossly stable in   size compared to the 2017 MRI examination. The marked narrowing of the   left cavernous segment internal carotid artery flow-void is also grossly   stable.      *** END OF ADDENDUM 07/26/2019  ***      PROCEDURE DATE:  07/17/2019            INTERPRETATION:  CLINICAL INFORMATION: Left eye twitching and cramping.   Follow-up left cavernous sinus and medial sphenoid wing meningioma.    TECHNIQUE: MRI of the brain was performed with and without contrast.   Sagittal and axial T1, axial T2, axial T2 FLAIR, SWI, DWI, ADC map,   axial, sagittal, and coronal T1 postcontrast images were obtained.    5.5 cc of Gadavist was injected, with 2.0 cc discarded.    COMPARISON: CT brain 12/4/2013.    FINDINGS:    There is a 3.0 x 2.9 x 2.4 cm (AP by TR by CC) avidly enhancing,   extra-axial, dural based mass in the left anterior middle cranial fossa,   which invades into the left cavernous sinus. There is encasement of the   left cavernous internal carotid artery, with marked narrowing of the   vascular flow-voids. There is mass effect on the anterior medial temporal   lobe with no vasogenic edema on the T2/FLAIR sequences. The mass extends   to the left lateral aspect of the sella, and also extends into the   suprasellar cistern. There is superior and medial deviation of the left   optic nerve, optic chiasm, and left optic tract from mass effect. The   mass abuts and may involve the left optic canal.    There is mild prominence of ventricles and sulci compatible with age   appropriate cerebral volume loss. There is no diffusion abnormality to   suggest acute or subacute infarction. 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 A focus of blooming artifact in the left frontal   lobe may be secondary to a calcification, cavernoma, or hemosiderin   deposition from prior hemorrhage. Major flow-voids at the base of the   brain follow expected course and contour.    The calvarium is intact. The visualized intraorbital compartments,   paranasal sinuses and tympanomastoid cavities appear free of acute   disease.    IMPRESSION:    Left anterior middle cranial fossa meningioma with invasion of the left   cavernous sinus, as above.

## 2023-11-24 NOTE — DISCHARGE NOTE PROVIDER - NSDCCPTREATMENT_GEN_ALL_CORE_FT
PRINCIPAL PROCEDURE  Procedure: MRA head w/contrast  Findings and Treatment:       SECONDARY PROCEDURE  Procedure: CTA head w/w/o contrast  Findings and Treatment: IMPRESSION:  CTA BRAIN:  1. 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  2. 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 This could be related to extrinsic mass effect from the dural   based mass in the left middle cranial fossa and/or parasitized flow to   the mass.    Procedure: CTA neck w/w/o contrast  Findings and Treatment:   CTA NECK: No flow-limiting stenosis or occlusion. Gradual diminished   enhancement of the left internal carotid artery beginning at the C2 level   and extending cranially.    Procedure: CT head wo contrast  Findings and Treatment: FINDINGS: An extra-axial mass is seen off the left cavernous sinus   location which measures 2.6 x 1.7 cm (series 2, image 8). Minimal   adjacent mass effect is seen. There is no surrounding vasogenic edema.  There is no acute intracranial hemorrhage, shift of the midline   structures, herniation, or evidence of obstructive hydrocephalus.   Ventriculomegaly is seen.  There is diffuse cerebral volume loss with prominence of the sulci,   fissures, and cisternal spaces which is normal for the patient's age.   There is mild periventricular white matter hypoattenuation statistically   compatible with microvascular type changes given calcific atherosclerotic   disease of the intracranial arteries.  The paranasal sinuses and tympanomastoid cavities are clear. Chronic   bilateral nasal bone deformities are notable. The calvarium appears   intact. The orbits appear unremarkable.  IMPRESSION: No acute intracranial hemorrhage.  Suspected meningioma adjacent to the left cavernous sinus. This can be   more optimally evaluated with a contrast-enhanced MRI study of the brain,   if clinically warranted, and if there are no clinical contraindications.      Procedure: CT chest wo contrast  Findings and Treatment: FINDINGS:  Tubes/Lines: None.  Lungs, airways and pleura: No pneumothorax. Small left pleural effusion   likely a simple fluid density. Bilateral lower lobe, right middle lobe,   and lingular linear atelectasis.  Mediastinum: The thyroid gland is normal. No enlarged chest. Unremarkable   esophagus. The heart is enlarged. No pericardial effusion. The aorta is   tortuous and ectatic.  Upper Abdomen: Cholelithiasis. Diverticulosis.  Bones And Soft Tissues: The left lateral fourth through seventh ribs are   fractured. Spondylosis of the thoracic spine. The bones are qualitatively   osteopenic.  The soft tissues are unremarkable.  IMPRESSION:  1.  Left lateral fourth through seventh rib fractures.  2.  No pneumothorax.  3.  Small pleural effusion likely of simple fluid density.  4.  Cholelithiasis.    Procedure: Transthoracic echocardiography (TTE)  Findings and Treatment: 1. Left ventricular systolic function is normal with an ejection fraction of 59 % by Singh's method of disks.   2. Normal right ventricular cavity size, wall thickness, and systolic function.   3. Tricuspid aortic valve with normal leaflet excursion with normal systolic excursion. calcification of the aortic valve leaflets.   4. Mild mitral regurgitation.       PRINCIPAL PROCEDURE  Procedure: MRA head w/contrast  Findings and Treatment:   < end of copied text >  IMPRESSION:  1.  BRAIN:   Meningioma centered at the left cavernous sinus encases the   left internal carotid artery.  Asymmetric left lateral ventricular   dilatation is nonspecific but is associated with asymmetric subependymal   hyperintensity on the long TR images without enhancement, indeterminate   ependymal disease process.  Second small meningioma left posterior   parasagittal vertex.  Small arachnoid cyst left middle cranial fossa  2.  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 Perfusion of its intracranial distribution with anterior   right-to-left collateral circulation via patent anterior communicating   artery and with posterior to anterior collateral circulation via patent   left posterior communicating artery contribute to perfusion of an   attenuated left middle cerebral artery. The anterior cerebral arteries   are closer to symmetric.  3. POSTERIOR CIRCULATION:  Intact.  --- End of Report ---< from: MR Head w/wo IV Cont (11.27.23 @ 12:02) >        SECONDARY PROCEDURE  Procedure: CTA head w/w/o contrast  Findings and Treatment: IMPRESSION:  CTA BRAIN:  1. 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  2. 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 This could be related to extrinsic mass effect from the dural   based mass in the left middle cranial fossa and/or parasitized flow to   the mass.    Procedure: CTA neck w/w/o contrast  Findings and Treatment:   CTA NECK: No flow-limiting stenosis or occlusion. Gradual diminished   enhancement of the left internal carotid artery beginning at the C2 level   and extending cranially.    Procedure: CT head wo contrast  Findings and Treatment: FINDINGS: An extra-axial mass is seen off the left cavernous sinus   location which measures 2.6 x 1.7 cm (series 2, image 8). Minimal   adjacent mass effect is seen. There is no surrounding vasogenic edema.  There is no acute intracranial hemorrhage, shift of the midline   structures, herniation, or evidence of obstructive hydrocephalus.   Ventriculomegaly is seen.  There is diffuse cerebral volume loss with prominence of the sulci,   fissures, and cisternal spaces which is normal for the patient's age.   There is mild periventricular white matter hypoattenuation statistically   compatible with microvascular type changes given calcific atherosclerotic   disease of the intracranial arteries.  The paranasal sinuses and tympanomastoid cavities are clear. Chronic   bilateral nasal bone deformities are notable. The calvarium appears   intact. The orbits appear unremarkable.  IMPRESSION: No acute intracranial hemorrhage.  Suspected meningioma adjacent to the left cavernous sinus. This can be   more optimally evaluated with a contrast-enhanced MRI study of the brain,   if clinically warranted, and if there are no clinical contraindications.      Procedure: CT chest wo contrast  Findings and Treatment: FINDINGS:  Tubes/Lines: None.  Lungs, airways and pleura: No pneumothorax. Small left pleural effusion   likely a simple fluid density. Bilateral lower lobe, right middle lobe,   and lingular linear atelectasis.  Mediastinum: The thyroid gland is normal. No enlarged chest. Unremarkable   esophagus. The heart is enlarged. No pericardial effusion. The aorta is   tortuous and ectatic.  Upper Abdomen: Cholelithiasis. Diverticulosis.  Bones And Soft Tissues: The left lateral fourth through seventh ribs are   fractured. Spondylosis of the thoracic spine. The bones are qualitatively   osteopenic.  The soft tissues are unremarkable.  IMPRESSION:  1.  Left lateral fourth through seventh rib fractures.  2.  No pneumothorax.  3.  Small pleural effusion likely of simple fluid density.  4.  Cholelithiasis.    Procedure: Transthoracic echocardiography (TTE)  Findings and Treatment: 1. Left ventricular systolic function is normal with an ejection fraction of 59 % by Singh's method of disks.   2. Normal right ventricular cavity size, wall thickness, and systolic function.   3. Tricuspid aortic valve with normal leaflet excursion with normal systolic excursion. calcification of the aortic valve leaflets.   4. Mild mitral regurgitation.

## 2023-11-24 NOTE — PHYSICAL THERAPY INITIAL EVALUATION ADULT - ADDITIONAL COMMENTS
Pt lives alone in a house, 4 steps to enter and a flight to the second floor. Prior to admission pt reports ambulating independently without a device and was independent in ADLs.     Following evaluation, pt was left semireclined in bed in no distress, call bell in reach.

## 2023-11-24 NOTE — H&P ADULT - NSHPPHYSICALEXAM_GEN_ALL_CORE
HEAD:  Atraumatic, normocephalic  EYES: EOMI, PERRLA, conjunctiva and sclera clear  HEART: Regular rate and rhythm, no murmurs, rubs, or gallops  LUNGS: Unlabored respirations limited by pain.  Clear to auscultation bilaterally, no crackles, wheezing, or rhonchi  ABDOMEN: Soft, nontender, nondistended,  EXTREMITIES: 2+ peripheral pulses bilaterally. No clubbing, cyanosis, or edema.   NERVOUS SYSTEM:  A&Ox3, moving all extremities, no focal deficits   MSK: No TTP over bilateral upper or lower extremities Vital Signs Last 24 Hrs  T(C): 36.6 (24 Nov 2023 10:10), Max: 37.1 (24 Nov 2023 00:23)  T(F): 97.8 (24 Nov 2023 10:10), Max: 98.7 (24 Nov 2023 00:23)  HR: 73 (24 Nov 2023 10:10) (61 - 75)  BP: 137/63 (24 Nov 2023 10:10) (116/98 - 157/73)  BP(mean): --  RR: 18 (24 Nov 2023 10:10) (16 - 18)  SpO2: 99% (24 Nov 2023 10:10) (97% - 100%)    PHYSICAL EXAM:  General: Awake and alert.  No acute distress.  Head: Normocephalic, atraumatic.    Eyes: No nystagmus.  PERRL.  EOMI.  No scleral icterus.  No conjunctival pallor.  Mouth: Moist MM.  No oropharyngeal exudates.    Neck: Supple.  Full range of motion.  No JVD.  No LAD.  No thyromegaly.  Trachea midline.    Heart: RRR.  Normal S1 and S2.  No murmurs, rubs, or gallops.  No LE edema b/l.   Lungs: Nonlabored breathing.  Good inspiratory effort.  CTAB.  No wheezes, crackles, or rhonchi.    Abdomen: BS+, soft, nontender with no rebound or guarding, nondistended.  No hepatomegaly.   Skin: Warm and dry.  No rashes.  Extremities: No cyanosis.  2+ peripheral pulses b/l.  Musculoskeletal: No joint deformities.  No spinal or paraspinal tenderness.  Neuro: A&Ox3.  CN II-XII intact.  5/5 motor strength in UE and LE b/l.  Tactile sensation intact in UE and LE b/l.  Cerebellar function intact as assessed by finger-to-nose test.  No focal deficits.

## 2023-11-24 NOTE — H&P ADULT - TIME-BASED
Patient notified of recent lab results and has verbalized understanding. No further questions or concerns.      75

## 2023-11-24 NOTE — PHYSICAL THERAPY INITIAL EVALUATION ADULT - REHAB POTENTIAL, PT EVAL
Subjective   80-year-old female who presents for evaluation of lower extremity edema, abdominal distention and pain, shortness of breath.  She reports that for 2 to 3 weeks she has been noticing and slowly but steadily increasing lower extremity edema that has now progressed all the way up into her abdomen as she states it is causing abdominal pain and abdominal distention.  She also reports some mild shortness of breath that is most prominently noticed with activity.  She reports intense pain in her legs whenever she attempts to walk.  She has a previous history of a flutter and has required cardioversion in the past.  The patient reports that she takes Xarelto and metoprolol on a regular basis for her atrial flutter.  She also takes Lasix 20 mg twice daily for treatment of lower extremity edema.  She has been taking all medications as prescribed without missing any medications.  She denies chest pain.  She denies palpitation.  She denies sick contacts, fever, body aches, or chills.  No urinary symptoms include no dysuria, frequency, urgency.  No reported change in bowel function including no diarrhea or blood in the stool.  No other acute complaints.          Review of Systems   Constitutional: Positive for fatigue. Negative for chills and fever.   HENT: Negative for congestion, ear pain, postnasal drip, sinus pressure and sore throat.    Eyes: Negative for pain, redness and visual disturbance.   Respiratory: Positive for shortness of breath. Negative for cough and chest tightness.    Cardiovascular: Positive for leg swelling. Negative for chest pain and palpitations.   Gastrointestinal: Positive for abdominal pain. Negative for anal bleeding, blood in stool, diarrhea, nausea and vomiting.   Endocrine: Negative for polydipsia and polyuria.   Genitourinary: Negative for difficulty urinating, dysuria, frequency and urgency.   Musculoskeletal: Negative for arthralgias, back pain and neck pain.   Skin: Negative for  pallor and rash.   Allergic/Immunologic: Negative for environmental allergies and immunocompromised state.   Neurological: Negative for dizziness, weakness and headaches.   Hematological: Negative for adenopathy.   Psychiatric/Behavioral: Negative for confusion, self-injury and suicidal ideas. The patient is not nervous/anxious.    All other systems reviewed and are negative.      Past Medical History:   Diagnosis Date   • ASD (atrial septal defect)    • Atrial flutter (HCC) 9/24/2020    Added automatically from request for surgery [id number]   • CKD (chronic kidney disease) 10/6/2020   • Diabetes (HCC)        Allergies   Allergen Reactions   • Statins Myalgia       Past Surgical History:   Procedure Laterality Date   • CARDIAC CATHETERIZATION N/A 10/9/2020    Procedure: Right Heart Cath;  Surgeon: Irvin Mccray MD;  Location:  [facility];  Service: Cardiology;  Laterality: N/A;   • CARDIAC ELECTROPHYSIOLOGY PROCEDURE N/A 10/15/2020    Procedure: Ablation atrial flutter;  Surgeon: Joseph Garcia DO;  Location:  [facility];  Service: Cardiovascular;  Laterality: N/A;   • CHOLECYSTECTOMY         Family History   Problem Relation Age of Onset   • Hypertension Mother    • Hyperlipidemia Mother    • Diabetes Mother    • Emphysema Father    • Other Son         HEART PROBLEMS   • Diabetes Sister    • Other Brother         HEART PROBLEMS   • Lung disease Sister    • No Known Problems Sister    • Diabetes Sister    • Diabetes Sister    • Other Brother         HEART PROBLEMS   • Other Brother         HEART PROBLEMS   • Other Brother         HEART PROBLEMS   • Other Brother         HEART PROBLEMS       Social History     Socioeconomic History   • Marital status:    Tobacco Use   • Smoking status: Never Smoker   • Smokeless tobacco: Never Used   Substance and Sexual Activity   • Alcohol use: Never   • Drug use: Never   • Sexual activity: Defer           Objective   Physical  Exam  Vitals and nursing note reviewed.   Constitutional:       General: She is not in acute distress.     Appearance: Normal appearance. She is well-developed. She is not toxic-appearing or diaphoretic.   HENT:      Head: Normocephalic and atraumatic.      Right Ear: External ear normal.      Left Ear: External ear normal.      Nose: Nose normal.   Eyes:      General: Lids are normal.      Pupils: Pupils are equal, round, and reactive to light.   Neck:      Trachea: No tracheal deviation.   Cardiovascular:      Rate and Rhythm: Normal rate and regular rhythm.      Pulses: No decreased pulses.      Heart sounds: Normal heart sounds. No murmur heard.    No friction rub. No gallop.   Pulmonary:      Effort: Pulmonary effort is normal. No respiratory distress.      Breath sounds: Examination of the right-lower field reveals rales. Examination of the left-lower field reveals rales. Rales present. No decreased breath sounds, wheezing or rhonchi.   Abdominal:      General: Bowel sounds are normal.      Palpations: Abdomen is soft.      Tenderness: There is generalized abdominal tenderness. There is no guarding or rebound.   Musculoskeletal:         General: No deformity. Normal range of motion.      Cervical back: Normal range of motion and neck supple.      Right lower le+ Pitting Edema present.      Left lower le+ Pitting Edema present.   Lymphadenopathy:      Cervical: No cervical adenopathy.   Skin:     General: Skin is warm and dry.      Findings: No rash.   Neurological:      Mental Status: She is alert and oriented to person, place, and time.      Cranial Nerves: No cranial nerve deficit.      Sensory: No sensory deficit.   Psychiatric:         Speech: Speech normal.         Behavior: Behavior normal.         Thought Content: Thought content normal.         Judgment: Judgment normal.         Procedures           ED Course                                           MDM  Number of Diagnoses or Management  Options  Acute on chronic congestive heart failure, unspecified heart failure type (HCC): new and requires workup  Elevated troponin: new and requires workup  Hypervolemia, unspecified hypervolemia type: new and requires workup  Peripheral edema: new and requires workup  Typical atrial flutter (HCC): new and requires workup  Diagnosis management comments: The patient's heart rhythm is regular but she is in a flutter.  I feel this is contributing to inefficiency and secondary lower extremity and abdominal wall edema.    I have ordered 80 mg of Lasix.    Lab evaluation does show slightly elevated BNP and elevated troponin secondary to continued a flutter.    No ischemic changes on EKG.    I discussed the patient with hospitalist, Dr. Leyva.  The hospital service will consult on the patient to determine status of admission.       Amount and/or Complexity of Data Reviewed  Clinical lab tests: ordered and reviewed  Tests in the radiology section of CPT®: ordered and reviewed  Decide to obtain previous medical records or to obtain history from someone other than the patient: yes  Obtain history from someone other than the patient: yes  Review and summarize past medical records: yes  Discuss the patient with other providers: yes  Independent visualization of images, tracings, or specimens: yes        Final diagnoses:   Typical atrial flutter (HCC)   Peripheral edema   Hypervolemia, unspecified hypervolemia type   Acute on chronic congestive heart failure, unspecified heart failure type (HCC)   Elevated troponin       ED Disposition  ED Disposition     ED Disposition   Decision to Admit    Condition   --    Comment   Level of Care: Telemetry [5]   Diagnosis: Typical atrial flutter (HCC) [865158]   Certification: I Certify That Inpatient Hospital Services Are Medically Necessary For Greater Than 2 Midnights               No follow-up provider specified.       Medication List      No changes were made to your prescriptions  during this visit.          Roberto Etienne MD  07/11/22 2010     good, to achieve stated therapy goals

## 2023-11-24 NOTE — DISCHARGE NOTE PROVIDER - NPI NUMBER (FOR [employer]YS[employer] USE ONLY) :
[[id number] [[id number] [[id number] [[id number]],[[id number]],[[id number]] [[id number]] [[id number]],[[id number]]

## 2023-11-24 NOTE — H&P ADULT - PROBLEM SELECTOR PLAN 4
DVT: Lovenox  Diet: Regular - Patient aware of diagnosis and has proper outpatient follow-up  - Obtain out-patient records in AM in regards to occlusion of ICA  - Neurosurgery consult obtained on admission  - Per Neurosurgery, can do pharmacologic ppx for DVT ppx - Pt aware of diagnosis and has proper outpatient follow-up  - Obtain outpatient records in AM in regards to meningioma and occlusion of left ICA  - Neurosurgery consult obtained on admission  - F/u MRI/MRA brain w/wo contrast to further evaluate meningioma  - Per Neurosurgery, can do pharmacologic ppx for DVT ppx

## 2023-11-24 NOTE — PHYSICAL THERAPY INITIAL EVALUATION ADULT - PATIENT/FAMILY/SIGNIFICANT OTHER GOALS STATEMENT, PT EVAL
- initial episode at home following dialysis yesterday; second episode during transport from ED to floor at [facility]  - hypoglycemic and hypertensive on initial presentation, now improved  - CT head showing remote posterior left frontal cortical infarct but no acute intracranial abnormalities  - differential includes dialysis dysequilibrium syndrome, post-traumatic seizures, and convulsive syncope    Recs:  - MRI brain WO, epilepsy protocol  - routine EEG  - serum tox screen   Pt goal is to get better and return home.

## 2023-11-24 NOTE — PROGRESS NOTE ADULT - PROBLEM SELECTOR PLAN 1
- Per patient story, patient slipped on the ground and fell. Denies dizziness, chest palpitations, or LOC at the time.   - EKG obtained in ED NSR  - Monitor on tele  - TTE  - Check orthostatics Per patient story, patient slipped on the ground and fell. Denies dizziness, chest palpitations, or LOC at the time.  Orthostatics OK  - EKG obtained in ED NSR  - Monitor on tele  - TTE  - PT Consult in

## 2023-11-24 NOTE — PROGRESS NOTE ADULT - ATTENDING COMMENTS
mechanical fall w rib fracture - pain control and incentive spirometry - per thoracic no intervention  trops flat, orthostatics neg, EKG sinus wo acute ischemic ST-T seg changes, CP reproducible d.t rib fx - TTE can be as out-pt if not done here  known meningioma s/p RT w CT here concerning for intracranial left ICA with string sign/near occlusion throughout the cavernous, clinoid and supraclinoid levels, with reconstitution in the communicating segment - pending MRH/MRA - neurosx no acute intervention, neuro pending MR findings    PT eval

## 2023-11-24 NOTE — CONSULT NOTE ADULT - PROBLEM SELECTOR RECOMMENDATION 9
No urgent neurosurgical intervention  No contraindication to DVT chemoprophylaxis  Likely will refer to follow up outpatient with Dr Harris

## 2023-11-24 NOTE — H&P ADULT - PROBLEM SELECTOR PLAN 3
- Patient aware of diagnosis and has proper outpatient follow-up - Patient aware of diagnosis and has proper outpatient follow-up  - Obtain out-patient records in AM in regards to occlusion of ICA  - Rad-onc consult - Patient aware of diagnosis and has proper outpatient follow-up  - Obtain out-patient records in AM in regards to occlusion of ICA  - Neurosurgery consult due to new complete occlusion of ICA  - Neurology consult CTA H/N with IV contrast showing long segment string sign/near occlusion throughout the intracranial left internal carotid artery.  - Neurosurgery and neurology consults obtained on admission  - F/u MRI/MRA w/wo contrast CTA H/N with IV contrast showing long segment string sign/near occlusion throughout the intracranial left internal carotid artery.  - Neurosurgery and neurology consults obtained on admission  - F/u MRI/MRA w/wo contrast to further evaluate left ICA occlusion

## 2023-11-24 NOTE — DISCHARGE NOTE PROVIDER - PROVIDER TOKENS
PROVIDER:[[id number]],FOLLOWUP:[1 month]] PROVIDER:[TOKEN:[[id number]],FOLLOWUP:[1 month]],PROVIDER:[TOKEN:[[id number]],FOLLOWUP:[1 week],ESTABLISHEDPATIENT:[T]],PROVIDER:[TOKEN:[[id number]],FOLLOWUP:[1 week],ESTABLISHEDPATIENT:[T]] PROVIDER:[TOKEN:[[id number]],FOLLOWUP:[1 month]],PROVIDER:[TOKEN:[[id number]],FOLLOWUP:[1 week],ESTABLISHEDPATIENT:[T]],PROVIDER:[[id number]],FOLLOWUP:[1 week],ESTABLISHEDPATIENT:[T]] PROVIDER:[[id number]],FOLLOWUP:[1 month]],PROVIDER:[TOKEN:[[id number]],FOLLOWUP:[1 week],ESTABLISHEDPATIENT:[T]],PROVIDER:[[id number]],FOLLOWUP:[1 week],ESTABLISHEDPATIENT:[T]]

## 2023-11-24 NOTE — PHYSICAL THERAPY INITIAL EVALUATION ADULT - PERTINENT HX OF CURRENT PROBLEM, REHAB EVAL
78 year old female presents post unwitnessed fall without prodromal symptoms. Patient has reported increasing dizziness in the last weeks. CT Head - No acute intracranial hemorrhage.

## 2023-11-24 NOTE — CONSULT NOTE ADULT - ASSESSMENT
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 Neurology consulted for dizziness for several weeks. Patient desribes dizziness as light headedness, denies room spinning dizziness. Exam nonfocal, except for EOM of left eye per patient chronic since radiation of meningioma. CTH  Dural based, homogeneously enhancing mass centered in the left-sided   Meckel's cave, obscuring and probably involving the cavernous sinus, extending into the sella and suprasellar cistern, with suspected orbital extension, possibly involving the optic nerve sheath.      Impression: Intermittent lightheadedness upon standing possibly due to orthostatic hypotension    Recommendation:  [] MRI B w/w/o  [] Neurosurgery consult for meningioma   [] Meclizine 25 mg Q 8 prn    To be seen by attending. 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 Neurology consulted for dizziness for several weeks. Patient desribes dizziness as light headedness, denies room spinning dizziness. Exam nonfocal, except for EOM of left eye per patient chronic since radiation of meningioma. CTH  Dural based, homogeneously enhancing mass centered in the left-sided   Meckel's cave, obscuring and probably involving the cavernous sinus, extending into the sella and suprasellar cistern, with suspected orbital extension, possibly involving the optic nerve sheath.      Impression: Intermittent lightheadedness upon standing possibly due to orthostatic hypotension    Recommendation:  [] Orthostatic vitals  [] MRI B w/w/o  [] Neurosurgery consult for meningioma   [] Meclizine 25 mg Q 8 prn    To be seen by attending.

## 2023-11-24 NOTE — H&P ADULT - ASSESSMENT
78yF PMHx meningioma coming 1 week s/p unwitnessed fall without prodromal symptoms. Patient has reported increasing dizziness in the last weeks and feels unsafe at home. Admitted to medicine for evaluation after unwitnessed fall.

## 2023-11-24 NOTE — CONSULT NOTE ADULT - ASSESSMENT
77 YO female presenting with chest pain X 1 day following a fall 1 week ago, with a left middle cranial fossa meningioma, S/P radiosurgery.  79 YO female presenting with chest pain X 1 day following a fall 1 week ago, with a left middle cranial fossa meningioma, S/P radiosurgery.

## 2023-11-24 NOTE — H&P ADULT - HISTORY OF PRESENT ILLNESS
78yF PMHx meningioma (diagnosed in 2016) presenting 1 week after a fall. Patient reports that she was in the kitchen wearing slippers when she turned and slipped and fell on her left side. She denies LOC, palpitations, dizziness, n/v prior or after the fall. She tried to find a piece of furniture to raise herself and was able to go  78yF PMHx meningioma (diagnosed in 2016) presenting 1 week after a fall. Patient reports that she was in the kitchen wearing slippers when she turned and slipped and fell on her left side. She denies LOC, palpitations, dizziness, n/v prior or after the fall. She tried to find a piece of furniture to raise herself and was able to ambulate afterwards. Patient was pain free until 11/22 when she started having left-sided chest pain that worsened to 10/10 pain on 11/23 after a shower that prompted her to come to the ED.    Patient was found to have a meningioma on imaging in 2016 but it was not acted on until July 2023 when it was noted to have increased in size. Patient finished radiation treatment on August 2023 and had an MRI done 10/31 which showed a stable meningioma with s 78yF PMHx meningioma (diagnosed in 2016) presenting 1 week after a fall. Patient reports that she was in the kitchen wearing slippers when she turned and slipped and fell on her left side. She denies LOC, palpitations, dizziness, n/v prior or after the fall. She tried to find a piece of furniture to raise herself and was able to ambulate afterwards. Patient was pain free until 11/22 when she started having left-sided chest pain that worsened to 10/10 pain on 11/23 after a shower that prompted her to come to the ED.    Patient was found to have a meningioma on imaging in 2016 but it was not acted on until July 2023 when it was noted to have increased in size. Patient finished radiation treatment on August 2023 and had an MRI done 10/31 which showed a stable meningioma with slight reduction (per patient's son, no official report given). Outpatient records show a MRI done in 2019 which showed a 3.0 x 2.9 x 2.4 cm  with encasement of the left cavernous internal carotid artery, with marked narrowing of the vascular flow-voids. 78yF PMHx meningioma (diagnosed in 2016) presenting 1 week after a fall. Patient reports that she was in the kitchen wearing slippers when she turned and slipped and fell on her left side. She denies LOC, palpitations, dizziness, n/v prior or after the fall. She tried to find a piece of furniture to raise herself and was able to ambulate afterwards. Patient was pain free until 11/22 when she started having left-sided chest pain that worsened to 10/10 pain on 11/23 after a shower that prompted her to come to the ED.    Patient was found to have a meningioma on imaging in 2016 but it was not acted on until July 2023 when it was noted to have increased in size. Patient finished radiation treatment on August 2023 and had an MRI done 10/31 which showed a stable meningioma with slight reduction (per patient's son, no official report given). Outpatient records show a MRI done in 2019 which showed a 3.0 x 2.9 x 2.4 cm dural based mass in the left anterior middle cranial fossa, which invades into the left cavernous sinuswith encasement of the left cavernous internal carotid artery, with marked narrowing of the vascular flow-voids.    In the ED, patient was afebrile, hemodynamically stable, SpO2 100% on RA. Labs were wnl.  78yF PMHx meningioma (diagnosed in 2016) presenting 1 week after a fall. Patient reports that she was in the kitchen wearing slippers when she turned and slipped and fell on her left side. She denies LOC, palpitations, dizziness, n/v prior or after the fall. She tried to find a piece of furniture to raise herself and was able to ambulate afterwards. Patient was pain free until 11/22 when she started having left-sided chest pain that worsened to 10/10 pain on 11/23 after a shower that prompted her to come to the ED.    Patient was found to have a meningioma on imaging in 2016 but it was not acted on until July 2023 when it was noted to have increased in size. Patient finished radiation treatment on August 2023 and had an MRI done 10/31 which showed a stable meningioma with slight reduction (per patient's son, no official report given). Outpatient records show a MRI done in 2019 which showed a 3.0 x 2.9 x 2.4 cm dural based mass in the left anterior middle cranial fossa, which invades into the left cavernous sinus with encasement of the left cavernous internal carotid artery, with marked narrowing of the vascular flow-voids.    In the ED, patient was afebrile, hemodynamically stable, SpO2 100% on RA. Labs were wnl. CT Chest significant for left lateral fourth through seventh rib fractures. CTH did not find any acute intracranial bleed and a 2.6 x 1.7 cm extra-axial mass in the left cavernous sinus. CT Angio significant for near occlusion throughout the intracranial left internal carotid artery.  78yF PMHx meningioma (diagnosed in 2016) presenting 1 week after a fall. Patient reports that she was in the kitchen wearing slippers when she turned and slipped and fell on her left side. She denies LOC, palpitations, dizziness, n/v prior or after the fall. She tried to find a piece of furniture to raise herself and was able to ambulate afterwards. Patient was pain free until 11/22 when she started having left-sided chest pain that worsened to 10/10 pain on 11/23 after a shower that prompted her to come to the ED.    Patient was found to have a meningioma on imaging in 2016 but it was not acted on until July 2023 when it was noted to have increased in size. Patient finished radiation treatment on August 2023 and had an MRI done 10/31 which showed a stable meningioma with slight reduction (per patient's son, no official report given). Outpatient records show a MRI done in 2019 which showed a 3.0 x 2.9 x 2.4 cm dural based mass in the left anterior middle cranial fossa, which invades into the left cavernous sinus with encasement of the left cavernous internal carotid artery, with marked narrowing of the vascular flow-voids. Follows Dr Jackson. for radiation treatment.     In the ED, patient was afebrile, hemodynamically stable, SpO2 100% on RA. Labs were wnl. CT Chest significant for left lateral fourth through seventh rib fractures. CTH did not find any acute intracranial bleed and a 2.6 x 1.7 cm extra-axial mass in the left cavernous sinus. CT Angio significant for near occlusion throughout the intracranial left internal carotid artery.

## 2023-11-24 NOTE — DISCHARGE NOTE PROVIDER - NSDCMRMEDTOKEN_GEN_ALL_CORE_FT
acetaminophen 325 mg oral tablet: 2 tab(s) orally every 6 hours as needed for Mild Pain (1 - 3)  D3 25 mcg (1000 intl units) oral tablet: 1 tab(s) orally once a day  lidocaine 4% topical film: Apply topically to affected area once a day as needed for  rib pain  naloxone 4 mg/0.1 mL nasal spray: 4 milligram(s) intranasally once as needed for  sedation Please administer a spray to each nostril if patient is overly sedated after taking oxycodone and call 911. Please repeat if patient still sedated  oxyCODONE 5 mg oral tablet: 1 tab(s) orally every 6 hours as needed for  severe pain Please take 5 mg for severe pain every 6 hours as needed, Please only take 2.5 mg for moderate pain every 6 hours as needed. Please stop taking if patient is over-sedated or with a respiratory rate slower than 10/minute MDD: 20 mg  polyethylene glycol 3350 oral powder for reconstitution: 17 gram(s) orally once a day  senna leaf extract oral tablet: 1 tab(s) orally once a day (at bedtime)   acetaminophen 325 mg oral tablet: 2 tab(s) orally every 6 hours as needed for Mild Pain (1 - 3)  D3 25 mcg (1000 intl units) oral tablet: 1 tab(s) orally once a day  ICD10: S22.42XD: Chest XR (PA+Lateral+Oblique Bilateral) 4 views: Please obtain just prior to appointment with Dr. Danny Person  lidocaine 4% topical film: Apply topically to affected area once a day as needed for  rib pain  naloxone 4 mg/0.1 mL nasal spray: 4 milligram(s) intranasally once as needed for  sedation Please administer a spray to each nostril if patient is overly sedated after taking oxycodone and call 911. Please repeat if patient still sedated  oxyCODONE 5 mg oral tablet: 1 tab(s) orally every 6 hours as needed for  severe pain Please take 5 mg for severe pain every 6 hours as needed, Please only take 2.5 mg for moderate pain every 6 hours as needed. Please stop taking if patient is over-sedated or with a respiratory rate slower than 10/minute MDD: 20 mg  polyethylene glycol 3350 oral powder for reconstitution: 17 gram(s) orally once a day  senna leaf extract oral tablet: 1 tab(s) orally once a day (at bedtime)

## 2023-11-24 NOTE — PROGRESS NOTE ADULT - SUBJECTIVE AND OBJECTIVE BOX
Patient seen and examined at bedside, resting comfortably in bed on RA.  Patient reports mild pain around ribs, but states it has improved from before.  Denies any other acute complaints.    Vital Signs Last 24 Hrs  T(C): 36.6 (24 Nov 2023 10:10), Max: 37.1 (24 Nov 2023 00:23)  T(F): 97.8 (24 Nov 2023 10:10), Max: 98.7 (24 Nov 2023 00:23)  HR: 73 (24 Nov 2023 10:10) (61 - 75)  BP: 137/63 (24 Nov 2023 10:10) (116/98 - 157/73)  BP(mean): --  RR: 18 (24 Nov 2023 10:10) (16 - 18)  SpO2: 99% (24 Nov 2023 10:10) (97% - 100%)    Parameters below as of 24 Nov 2023 10:10  Patient On (Oxygen Delivery Method): room air        General: A&Ox3, NAD  CV: RRR, well perfused  Resp: Breathing comfortably on RA, no resp distress, no accessory muscle use  GI: Soft, NT, ND  MSK: Pain around rib fracture site, MORAN x4  Pysch: Appropriate affect                          11.8   7.41  )-----------( 323      ( 24 Nov 2023 05:15 )             35.3       11-24    140  |  105  |  10  ----------------------------<  72  3.7   |  23  |  0.59    Ca    8.7      24 Nov 2023 05:15    TPro  6.4  /  Alb  3.6  /  TBili  0.3  /  DBili  x   /  AST  15  /  ALT  10  /  AlkPhos  91  11-24    A/P: 79y/o female pmhx OP and a meningioma seen in ED for fall x1 week ago. Found to have multiple rib fractures (Left 4th-7th ribs). CT Surgery called for surgical eval.  11/23 - rec'd keep overnight for observation and pain control.    - No Thoracic Surgical intervention indicated.  - Patient to follow up with Dr. Person as an outpatient in about 3-4 weeks with a new CXR.  - Continue pain control  - Care per primary team.  - Thoracic to sign off at this time.  - Above d/w Dr. Person Patient seen and examined at bedside, resting comfortably in bed on RA.  Patient reports mild pain around ribs, but states it has improved from before.  Denies any other acute complaints.    Vital Signs Last 24 Hrs  T(C): 36.6 (24 Nov 2023 10:10), Max: 37.1 (24 Nov 2023 00:23)  T(F): 97.8 (24 Nov 2023 10:10), Max: 98.7 (24 Nov 2023 00:23)  HR: 73 (24 Nov 2023 10:10) (61 - 75)  BP: 137/63 (24 Nov 2023 10:10) (116/98 - 157/73)  BP(mean): --  RR: 18 (24 Nov 2023 10:10) (16 - 18)  SpO2: 99% (24 Nov 2023 10:10) (97% - 100%)    Parameters below as of 24 Nov 2023 10:10  Patient On (Oxygen Delivery Method): room air        General: A&Ox3, NAD  CV: RRR, well perfused  Resp: Breathing comfortably on RA, no resp distress, no accessory muscle use  GI: Soft, NT, ND  MSK: Pain around rib fracture site, MORAN x4  Pysch: Appropriate affect                          11.8   7.41  )-----------( 323      ( 24 Nov 2023 05:15 )             35.3       11-24    140  |  105  |  10  ----------------------------<  72  3.7   |  23  |  0.59    Ca    8.7      24 Nov 2023 05:15    TPro  6.4  /  Alb  3.6  /  TBili  0.3  /  DBili  x   /  AST  15  /  ALT  10  /  AlkPhos  91  11-24    A/P: 77y/o female pmhx OP and a meningioma seen in ED for fall x1 week ago. Found to have multiple rib fractures (Left 4th-7th ribs). CT Surgery called for surgical eval.  11/23 - rec'd keep overnight for observation and pain control.    - No Thoracic Surgical intervention indicated.  - Patient to follow up with Dr. Person as an outpatient in about 3-4 weeks with a new CXR.  - Continue pain control  - Care per primary team.  - Thoracic to sign off at this time.  - Above d/w Dr. Person

## 2023-11-24 NOTE — H&P ADULT - PROBLEM SELECTOR PLAN 1
- Per patient story, patient slipped and fell - Per patient story, patient slipped on the ground and fell. Denies dizziness, chest palpitations, or LOC at the time  - EKG obtained in ED NSR  - Monitor on tele - Per patient story, patient slipped on the ground and fell. Denies dizziness, chest palpitations, or LOC at the time.   - EKG obtained in ED NSR  - Monitor on tele - Per patient story, patient slipped on the ground and fell. Denies dizziness, chest palpitations, or LOC at the time.   - EKG obtained in ED NSR  - Monitor on tele  - TTE  - Check orthostatics - Left 4-7th rib fractures with associated pain and shallower breaths. CT Chest pertinent for bilateral lower lobe, right middle lobe, and lingular linear atelectasis.  - Incentive spirometry explained and encouraged  - Pain control: Tylenol q6 PRN for 1-3 pain, Oxycodone IR 2.5 PRN for 4-7 pain, Oxycodone IR 5 for 8-10 - Pt with complaint of left-sided chest pain starting on Wednesday after a recent fall at home, found to have left lateral 4th-7th rib fractures with associated pain and shallower breaths. CT chest pertinent for small left pleural effusion and bilateral lower lobe, right middle lobe, and lingular linear atelectasis.  - Thoracic surgery consulted, no thoracic surgery intervention indicated at this time. Pt to follow up with Dr. Person as outpatient in 3 weeks' time for a repeat CXR given small left pleural effusion.   - Incentive spirometry explained and encouraged  - Pain control: Tylenol q6hrs PRN for 1-3 pain, Oxycodone IR 2.5 mg PRN for 4-7 pain, Oxycodone IR 5 mg PRN for 8-10  - EKG with no acute ischemic changes, hs-trops 6 and <6. Unlikely cardiac chest pain.  - Monitor respiratory status

## 2023-11-24 NOTE — DISCHARGE NOTE PROVIDER - HOSPITAL COURSE
Discharge Summary     Admit Date: 11-23-23  Discharge Date:    Admission diagnoses:   ***  Dizziness and giddiness        Discharge diagnoses:   ***    Hospital Course:   For full details, please see H&P, progress notes, consult notes and ancillary notes. Briefly, KIM GARRISON is a 78y.o. female with a PMHx of meningioma (diagnosed in 2016) presenting 1 week after a fall. Patient reports that she was in the kitchen wearing slippers when she turned and slipped and fell on her left side. She denies LOC, palpitations, dizziness, n/v prior or after the fall. She tried to find a piece of furniture to raise herself and was able to ambulate afterwards. Patient was pain free until 11/22 when she started having left-sided chest pain that worsened to 10/10 pain on 11/23 after a shower that prompted her to come to the ED.  # ***    # ***    On day of discharge, patient is clinically stable with no new exam findings or acute symptoms compared to prior. The patient was seen by the attending physician on the date of discharge and deemed stable and acceptable for discharge. The patient's chronic medical conditions were treated accordingly per the patient's home medication regimen. The patient's medication reconciliation (with changes made to chronic medications), follow up appointments, discharge orders, instructions, and significant lab and diagnostic studies are as noted.     Discharge follow up action items:     1. Follow up with PCP in 1-2 weeks.   2. Follow up labs, path, & imaging ***  3. Medication changes ***  4. On hold medications ***    Patient's ordered code status: ***    Patient disposition: ***     Discharge Summary     Admit Date: 11-23-23  Discharge Date:    Admission diagnoses:   ***  Dizziness and giddiness        Discharge diagnoses:   ***    Hospital Course:   For full details, please see H&P, progress notes, consult notes and ancillary notes. Briefly, KIM GARRISON is a 78y.o. female with a PMHx of meningioma (diagnosed in 2016) presenting 1 week after a fall. Patient reports that she was in the kitchen wearing slippers when she turned and slipped and fell on her left side. She denies LOC, palpitations, dizziness, n/v prior or after the fall. She tried to find a piece of furniture to raise herself and was able to ambulate afterwards. Patient was pain free until 11/22 when she started having left-sided chest pain that worsened to 10/10 pain on 11/23 after a shower that prompted her to come to the ED.  On admission she was found to have fractures of the 4th-7th left ribs.  Per thoracic surgery, there was no acute intervention needed. 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  Was also seen by neurology and neurosurgery for evaluation of the meningioma.  Pending MRI*******    Orthostatics negative, patient does not endorse LOC, TTE wnl.  Fall most likely mechanical, as per patient's description of the history.    On day of discharge, patient is clinically stable with no new exam findings or acute symptoms compared to prior. The patient was seen by the attending physician on the date of discharge and deemed stable and acceptable for discharge. The patient's chronic medical conditions were treated accordingly per the patient's home medication regimen. The patient's medication reconciliation (with changes made to chronic medications), follow up appointments, discharge orders, instructions, and significant lab and diagnostic studies are as noted.     Discharge follow up action items:     1. Follow up with PCP in 1-2 weeks.   2. Follow up labs, path, & imaging ***  3. Medication changes ***  4. On hold medications ***    Patient's ordered code status: FULL CODE    Patient disposition: ***     Discharge Summary     Admit Date: 11-23-23  Discharge Date:    Admission diagnoses:     Mechanical fall        Discharge diagnoses:     Mechanical Fall  Left Broken Ribs (4-7)  Narrowing of internal carotid artery    Hospital Course:   For full details, please see H&P, progress notes, consult notes and ancillary notes. Briefly, KIM GARRISON is a 78y.o. female with a PMHx of meningioma (diagnosed in 2016) presenting 1 week after a fall. Patient reports that she was in the kitchen wearing slippers when she turned and slipped and fell on her left side. She denies LOC, palpitations, dizziness, n/v prior or after the fall. She tried to find a piece of furniture to raise herself and was able to ambulate afterwards. Patient was pain free until 11/22 when she started having left-sided chest pain that worsened to 10/10 pain on 11/23 after a shower that prompted her to come to the ED.  On admission she was found to have fractures of the 4th-7th left ribs.  Per thoracic surgery, there was no acute intervention needed. 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  Was also seen by neurology and neurosurgery for evaluation of the meningioma.  Pending MRI*******    Orthostatics negative, patient does not endorse LOC, TTE wnl.  Fall most likely mechanical, as per patient's description of the history.    On day of discharge, patient is clinically stable with no new exam findings or acute symptoms compared to prior. The patient was seen by the attending physician on the date of discharge and deemed stable and acceptable for discharge. The patient's chronic medical conditions were treated accordingly per the patient's home medication regimen. The patient's medication reconciliation (with changes made to chronic medications), follow up appointments, discharge orders, instructions, and significant lab and diagnostic studies are as noted.     Discharge follow up action items:     1. Follow up with PCP in 1-2 weeks.   2. Follow up labs, path, & imaging ***  3. Medication changes ***  4. On hold medications ***    Patient's ordered code status: FULL CODE    Patient disposition: ***     Discharge Summary     Admit Date: 11-23-23  Discharge Date: 11/27/23    Admission diagnoses: Rib Fracture    Mechanical fall        Discharge diagnoses: Rib Fracture    Mechanical Fall  Left Broken Ribs (4-7)  Narrowing of internal carotid artery    Hospital Course:   For full details, please see H&P, progress notes, consult notes and ancillary notes. Briefly, KIM GARRISON is a 78y.o. female with a PMHx of meningioma (diagnosed in 2016) presenting 1 week after a fall. Patient reports that she was in the kitchen wearing slippers when she turned and slipped and fell on her left side. She denies LOC, palpitations, dizziness, n/v prior or after the fall. She tried to find a piece of furniture to raise herself and was able to ambulate afterwards. Patient was pain free until 11/22 when she started having left-sided chest pain that worsened to 10/10 pain on 11/23 after a shower that prompted her to come to the ED.  On admission she was found to have fractures of the 4th-7th left ribs.  Per thoracic surgery, there was no acute intervention needed. 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  Was also seen by neurology and neurosurgery for evaluation of the meningioma.  Pending MRI which showed no acute interval change requiring no intervention as per neurosurgery or neurology. Patient was deemed stable for discharge to home    Orthostatics negative, patient does not endorse LOC, TTE wnl.  Fall most likely mechanical, as per patient's description of the history.    On day of discharge, patient is clinically stable with no new exam findings or acute symptoms compared to prior. The patient was seen by the attending physician on the date of discharge and deemed stable and acceptable for discharge. The patient's chronic medical conditions were treated accordingly per the patient's home medication regimen. The patient's medication reconciliation (with changes made to chronic medications), follow up appointments, discharge orders, instructions, and significant lab and diagnostic studies are as noted.     Discharge follow up action items:     1. Follow up with PCP in 1-2 weeks. Follow up with Dr. Person in 3-4 weeks with a new CXR  2. Follow up labs, path, & imaging CXR in 3-4 weeks prior to seeing Dr. Person  3. Medication changes Oxycodone 5mg q6h PRN for 3 days  4. On hold medications None    Patient's ordered code status: FULL CODE    Patient disposition: Home     Discharge Summary     Admit Date: 11-23-23  Discharge Date: 11-28-23    Admission diagnoses: Rib Fracture    Mechanical fall        Discharge diagnoses: Rib Fracture    Mechanical Fall  Left Broken Ribs (4-7)  Narrowing of internal carotid artery    Hospital Course:   For full details, please see H&P, progress notes, consult notes and ancillary notes. Briefly, KIM GARRISON is a 78y.o. female with a PMHx of meningioma (diagnosed in 2016) presenting 1 week after a fall. Patient reports that she was in the kitchen wearing slippers when she turned and slipped and fell on her left side. She denies LOC, palpitations, dizziness, n/v prior or after the fall. She tried to find a piece of furniture to raise herself and was able to ambulate afterwards. Patient was pain free until 11/22 when she started having left-sided chest pain that worsened to 10/10 pain on 11/23 after a shower that prompted her to come to the ED.  On admission she was found to have fractures of the 4th-7th left ribs.  Per thoracic surgery, there was no acute intervention needed. 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  Was also seen by neurology and neurosurgery for evaluation of the meningioma.  Pending MRI which showed no acute interval change requiring no intervention as per neurosurgery or neurology. Patient was deemed stable for discharge to home    Orthostatics negative, patient does not endorse LOC, TTE wnl.  Fall most likely mechanical, as per patient's description of the history.    On day of discharge, patient is clinically stable with no new exam findings or acute symptoms compared to prior. The patient was seen by the attending physician on the date of discharge and deemed stable and acceptable for discharge. The patient's chronic medical conditions were treated accordingly per the patient's home medication regimen. The patient's medication reconciliation (with changes made to chronic medications), follow up appointments, discharge orders, instructions, and significant lab and diagnostic studies are as noted.     Discharge follow up action items:     1. Follow up with PCP in 1-2 weeks. Follow up with Dr. Person in 3-4 weeks with a new CXR  2. Follow up labs, path, & imaging CXR in 3-4 weeks prior to seeing Dr. Person  3. Medication changes Oxycodone 5mg q6h PRN for 3 days  4. On hold medications None    Patient's ordered code status: FULL CODE    Patient disposition: Home     Discharge Summary     Admit Date: 11-23-23  Discharge Date: 11-29-23    Admission diagnoses: Rib Fracture    Mechanical fall        Discharge diagnoses: Rib Fracture    Mechanical Fall  Left Broken Ribs (4-7)  Narrowing of internal carotid artery    Hospital Course:   For full details, please see H&P, progress notes, consult notes and ancillary notes. Briefly, KIM GARRISON is a 78y.o. female with a PMHx of meningioma (diagnosed in 2016) presenting 1 week after a fall. Patient reports that she was in the kitchen wearing slippers when she turned and slipped and fell on her left side. She denies LOC, palpitations, dizziness, n/v prior or after the fall. She tried to find a piece of furniture to raise herself and was able to ambulate afterwards. Patient was pain free until 11/22 when she started having left-sided chest pain that worsened to 10/10 pain on 11/23 after a shower that prompted her to come to the ED.  On admission she was found to have fractures of the 4th-7th left ribs.  Per thoracic surgery, there was no acute intervention needed. 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  Was also seen by neurology and neurosurgery for evaluation of the meningioma.  Pending MRI which showed no acute interval change requiring no intervention as per neurosurgery or neurology. Patient was deemed stable for discharge to home    Orthostatics negative, patient does not endorse LOC, TTE wnl.  Fall most likely mechanical, as per patient's description of the history.    On day of discharge, patient is clinically stable with no new exam findings or acute symptoms compared to prior. The patient was seen by the attending physician on the date of discharge and deemed stable and acceptable for discharge. The patient's chronic medical conditions were treated accordingly per the patient's home medication regimen. The patient's medication reconciliation (with changes made to chronic medications), follow up appointments, discharge orders, instructions, and significant lab and diagnostic studies are as noted.     Discharge follow up action items:     1. Follow up with PCP in 1-2 weeks. Follow up with Dr. Person in 3-4 weeks with a new CXR  2. Follow up labs, path, & imaging CXR in 3-4 weeks prior to seeing Dr. Person  3. Medication changes Oxycodone 5mg q6h PRN for 3 days  4. On hold medications None    Patient's ordered code status: FULL CODE    Patient disposition: Home

## 2023-11-24 NOTE — H&P ADULT - NSHPREVIEWOFSYSTEMS_GEN_ALL_CORE
REVIEW OF SYSTEMS:    CONSTITUTIONAL: No weakness, fevers or chills  RESPIRATORY: No cough No shortness of breath  CARDIOVASCULAR: + chest pain or palpitations  GASTROINTESTINAL: No nausea, vomiting, or hematemesis;  NEUROLOGICAL: +Dizziness REVIEW OF SYSTEMS:    CONSTITUTIONAL: No weakness, fevers or chills  RESPIRATORY: No cough No shortness of breath  CARDIOVASCULAR: + chest pain. No palpitations  GASTROINTESTINAL: No nausea, vomiting, abdominal pain  NEUROLOGICAL: +Dizziness Constitutional: No generalized weakness, fevers, chills, or weight loss  Eyes: No visual changes, double vision, or eye pain  Ears, Nose, Mouth, Throat: No runny nose, sinus pain, ear pain, tinnitus, sore throat, dysphagia, or odynophagia  Cardiovascular: +Chest pain. No palpitations or LE edema.  Respiratory: No cough, wheezing, hemoptysis, or shortness of breath  Gastrointestinal: No abdominal pain, dysphagia, anorexia, nausea/vomiting, diarrhea/constipation, hematemesis, melena, or BRBPR  Genitourinary: No dysuria, frequency, urgency, or hematuria  Musculoskeletal: +Fall. No neck pain or back pain. No joint pain, swelling, or decreased ROM.  Skin: No pruritus, rashes, lesions, or wounds  Neurologic: +Dizziness. No syncope, seizures, headache, paresthesias, numbness, or limb weakness.  Psychiatric: No depression, anxiety, difficulty concentrating, anhedonia, or lack of energy  Endocrine: No heat/cold intolerance, mood swings, sweats, polydipsia, or polyuria  Hematologic/lymphatic: No purpura, petechia, or prolonged or excessive bleeding after dental extraction / injury  Allergic/Immunologic: No anaphylaxis or allergic response to materials, foods, animals    Positives and pertinent negatives noted and all other systems negative.

## 2023-11-24 NOTE — H&P ADULT - ATTENDING COMMENTS
78yF PMHx meningioma (diagnosed in 2016) presenting 1 week after a fall with left-sided chest pain. Found to have left lateral 4th-7th rib fractures. Thoracic surgery consulted, no thoracic surgery intervention indicated at this time. Pt to follow up with Dr. Person as outpatient in 3 weeks' time for a repeat CXR given small left pleural effusion. Encourage incentive spirometry use. Pain control. 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 Neurosurgery and neurology both consulted. MRI/MRA brain w/wo contrast pending. Monitor on tele. TTE and orthostatics ordered to see if fall could have been caused by possible near syncope event.

## 2023-11-24 NOTE — PROGRESS NOTE ADULT - PROBLEM SELECTOR PLAN 3
- Patient aware of diagnosis and has proper outpatient follow-up  - Obtain out-patient records in AM in regards to occlusion of ICA  - Neurosurgery consult due to new complete occlusion of ICA  - Neurology consult  - F/u MRI brain w/without and MRA brain      Asymmetrically diminished flow throughout the   intracranial left ICA, with string sign/near occlusion throughout the   cavernous, clinoid and supraclinoid levels, with reconstitution in the   communicating segment    Homogeneously enhancing dural based mass measures 2.9 x   2.5 cm, obscures the cavernous sinus, extends into the sella and   suprasellar cistern and also appears to involve the proximal left optic   nerve sheath via the supraorbital foramen. MRI shows Asymmetrically diminished flow throughout the intracranial left ICA, with string sign/near occlusion throughout the cavernous, clinoid and supraclinoid levels, with reconstitution in the communicating segment.  Homogeneously enhancing dural based mass measures 2.9 x 2.5 cm, obscures the cavernous sinus, extends into the sella and suprasellar cistern and also appears to involve the proximal left optic nerve sheath via the supraorbital foramen  - Patient aware of diagnosis and has proper outpatient follow-up  - Obtain out-patient records in AM in regards to occlusion of ICA  - Neurosurgery consult due to new complete occlusion of ICA  - Neurology consult  - F/u MRI brain w/without and MRA brain.

## 2023-11-24 NOTE — DISCHARGE NOTE PROVIDER - NSDCCPCAREPLAN_GEN_ALL_CORE_FT
PRINCIPAL DISCHARGE DIAGNOSIS  Diagnosis: Rib fracture  Assessment and Plan of Treatment: You presented to the hospital with severe chest pain and were found to have fractured ribs.  This is from a fall you sustained a few days prior.  You were seen by a thoracic surgeon, and an intervention was not necessary at that time.  You were treated with pain management and incentive spirometry.  Rib fractures can make it harder to breath due to pain, and it is important to control your pain and continue using incentive spirometry at home.  Please call a doctor or go to the ER right away if:  - You develop increasing shortness of breath  - You develop bruising      SECONDARY DISCHARGE DIAGNOSES  Diagnosis: Meningioma  Assessment and Plan of Treatment:     Diagnosis: Fall at home  Assessment and Plan of Treatment: You had a fall at home because you tripped.  We did not find any evidence of neurological, blood pressure, or heart disorders that may have caused your fall.  It is important that you do things around the house to help prevent falls.  Some examples are:  - Remove excess clutter and keep floors clean  - Add additional lighting to walkways and dark areas  - Add railings for extra support  - Use non-slip mats in the bathroom  - Bring down any frequently used items off high shelves  Please call a doctor or go to the ER right away if:  - You have another fall or injury  - If you faint  - If you begin to experience dizziness or difficulty with balance     PRINCIPAL DISCHARGE DIAGNOSIS  Diagnosis: Rib fracture  Assessment and Plan of Treatment: You presented to the hospital with severe chest pain and were found to have fractured ribs.  This is from a fall you sustained a few days prior.  You were seen by the thoracic surgeons, and an intervention was not necessary at that time.  You were treated with pain management and incentive spirometry.  Rib fractures can make it harder to breath due to pain, and it is important to control your pain and continue using incentive spirometry at home.  Please call a doctor or go to the ER right away if:  - You develop increasing shortness of breath  - You develop bruising  - If you experience further trauma to your chest  Please follow up with Dr. Danny Person in 3-4 weeks with a repeat chest xray in order to evaluate the healing of your ribs.  Please follow up with your PCP in 1-2 weeks for further evaluation of your general health      SECONDARY DISCHARGE DIAGNOSES  Diagnosis: Fall at home  Assessment and Plan of Treatment: You had a fall at home because you tripped.  We did not find any evidence of neurological, blood pressure, or heart disorders that may have caused your fall.  It is important that you do things around the house to help prevent falls.  Some examples are:  - Remove excess clutter and keep floors clean  - Add additional lighting to walkways and dark areas  - Add railings for extra support  - Use non-slip mats in the bathroom  - Bring down any frequently used items off high shelves  We evaluated you for other causes of fall including low blood pressure, cardiac arrhythmias, new stroke, all of which came back negative.  Please call a doctor or go to the ER right away if:  - You have another fall or injury  - If you faint  - If you begin to experience dizziness or difficulty with balance  Please follow up with your PCP in 1-2 weeks for further evaluation of your general health    Diagnosis: Meningioma  Assessment and Plan of Treatment: During your admission, you had a CT scan which showed decreased perfusion through one of your vessels due to compression from your meningioma. We were concerned so we got an MRI of your brain which showed did not show any signficant change in your vessels from your prior imaging. The neurosurgery team evaluated your case and determined there was no need for an urgent inpatient procedure. The neurology team also evaluated your case and determined there was no acute intervention required.  Please return to the ED if you experience a sudden fainting episode. If you have any sudden changes in vision, if you have any new severe or worsening headache.   Please follow up with Dr. Parish Jackson from neurosurgery in 1-2 weeks for further evaluation of your meningioma.

## 2023-11-24 NOTE — CONSULT NOTE ADULT - SUBJECTIVE AND OBJECTIVE BOX
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 Neurology consulted for dizziness for several weeks. Patient desribes dizziness as light headedness, denies room spinning dizziness. She reports having lightheadedness occasionally only when she goes from seated to standing position. Today she fell after tripping on her slipper in her kitchen, denies LOC. She then developed left sided cheat pain, which prompted her to come to the ED. She also had chronic left eye blurry vision after radiation of meningioma. Denies headache, weakness, numbness, changes to speech.   Of note, meningioma was found in 2016 and found to have grown in size. She underwent radiation treatment in 08/23. She reports having an MRI in last month showing stability of meningioma.     Review of Systems:   All other review of systems is negative unless indicated above.    Allergies:  penicillin (Rash)      PMHx/PSHx/Family Hx: As above, otherwise see below   Osteoporosis    H/O: varicose veins    Meningioma        Social Hx:  No current use of tobacco, alcohol, or illicit drugs      Medications:  MEDICATIONS  (STANDING):  influenza  Vaccine (HIGH DOSE) 0.7 milliLiter(s) IntraMuscular once    MEDICATIONS  (PRN):  acetaminophen     Tablet .. 650 milliGRAM(s) Oral every 6 hours PRN Mild Pain (1 - 3)  melatonin 3 milliGRAM(s) Oral at bedtime PRN Insomnia  oxyCODONE    IR 2.5 milliGRAM(s) Oral every 6 hours PRN Moderate Pain (4 - 6)  oxyCODONE    IR 5 milliGRAM(s) Oral every 6 hours PRN Severe Pain (7 - 10)      Vitals:  T(C): 36.3 (11-24-23 @ 01:00), Max: 37.1 (11-24-23 @ 00:23)  HR: 61 (11-24-23 @ 01:00) (61 - 75)  BP: 127/61 (11-24-23 @ 01:00) (116/98 - 157/73)  RR: 17 (11-24-23 @ 01:00) (16 - 17)  SpO2: 99% (11-24-23 @ 01:00) (97% - 100%)    Physical Examination:   General - Sleeping  Limited exam as patient is sleeping     Neurologic Exam:  Mental status - Awake, Alert, Oriented to person, place, refused to answer time. Speech clear, fluent. Follows simple commands.     Cranial nerves - Difficult to assess pupillary and EOM as patient refusing exam, however pupils grossly round and symmetric, right eye EOMI, left eye all EOM impaired, VFF, face sensation (V1-V3) intact b/l, facial strength intact without asymmetry b/l, hearing intact b/l, palate with symmetric elevation, trapezius  5/5 strength b/l, tongue midline on protrusion with full lateral movement    Motor - Normal bulk and tone throughout.  Strength testing              Biceps      Triceps           R            5                 5               5                                               L             5                 5               5                                                             Hip Flexion    Hip Extension     Dorsiflexion    Plantar Flexion  R              5                           5                       5                           5                           L              5                           5                        5                           5                              Sensation - Light touch intact throughout    DTR's -             Biceps      Triceps     Brachioradialis      Patellar    Ankle    Toes/plantar response  R             2+             2+                  2+                       2+            2+                 Down  L              2+             2+                 2+                        2+           2+                 Down    Coordination - Finger to Nose intact b/l. No tremors appreciated    Gait and station - Deffered due to fall/safety risk     Labs:                        12.5   10.13 )-----------( 352      ( 23 Nov 2023 16:12 )             38.2     11-23    140  |  104  |  13  ----------------------------<  93  3.7   |  24  |  0.52    Ca    9.4      23 Nov 2023 16:12    TPro  7.6  /  Alb  4.2  /  TBili  <0.2  /  DBili  x   /  AST  19  /  ALT  11  /  AlkPhos  110  11-23    CAPILLARY BLOOD GLUCOSE        LIVER FUNCTIONS - ( 23 Nov 2023 16:12 )  Alb: 4.2 g/dL / Pro: 7.6 g/dL / ALK PHOS: 110 U/L / ALT: 11 U/L / AST: 19 U/L / GGT: x             PT/INR - ( 23 Nov 2023 16:12 )   PT: 11.5 sec;   INR: 1.03 ratio         PTT - ( 23 Nov 2023 16:12 )  PTT:32.4 sec        Radiology:  CT Head No Cont:  (23 Nov 2023 17:00)    < from: CT Angio Head w/ IV Cont (11.23.23 @ 22:18) >  CTA BRAIN:  1. 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  2. 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 This could be related to extrinsic mass effect from the dural   based mass in the left middle cranial fossa and/or parasitized flow to   the mass.    An MRI brain with and without contrast with MRA is recommended.      CTA NECK: No flow-limiting stenosis or occlusion. Gradual diminished   enhancement of the left internal carotid artery beginning at the C2 level   and extending cranially.     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 Neurology consulted for dizziness for several weeks. Patient desribes dizziness as light headedness, denies room spinning dizziness. She reports having lightheadedness occasionally only when she goes from seated to standing position. Today she fell after tripping on her slipper in her kitchen, denies LOC. She then developed left sided cheat pain, which prompted her to come to the ED. She also had chronic left eye blurry vision after radiation of meningioma. Denies headache, weakness, numbness, changes to speech.   Of note, meningioma was found in 2016 and found to have grown in size. She underwent radiation treatment in 08/23. She reports having an MRI in last month showing stability of meningioma.     Review of Systems:   All other review of systems is negative unless indicated above.    Allergies:  penicillin (Rash)      PMHx/PSHx/Family Hx: As above, otherwise see below   Osteoporosis    H/O: varicose veins    Meningioma        Social Hx:  No current use of tobacco, alcohol, or illicit drugs      Medications:  MEDICATIONS  (STANDING):  influenza  Vaccine (HIGH DOSE) 0.7 milliLiter(s) IntraMuscular once    MEDICATIONS  (PRN):  acetaminophen     Tablet .. 650 milliGRAM(s) Oral every 6 hours PRN Mild Pain (1 - 3)  melatonin 3 milliGRAM(s) Oral at bedtime PRN Insomnia  oxyCODONE    IR 2.5 milliGRAM(s) Oral every 6 hours PRN Moderate Pain (4 - 6)  oxyCODONE    IR 5 milliGRAM(s) Oral every 6 hours PRN Severe Pain (7 - 10)      Vitals:  T(C): 36.3 (11-24-23 @ 01:00), Max: 37.1 (11-24-23 @ 00:23)  HR: 61 (11-24-23 @ 01:00) (61 - 75)  BP: 127/61 (11-24-23 @ 01:00) (116/98 - 157/73)  RR: 17 (11-24-23 @ 01:00) (16 - 17)  SpO2: 99% (11-24-23 @ 01:00) (97% - 100%)    Physical Examination:   General - Sleeping  Limited exam as patient is sleeping     Neurologic Exam:  Mental status - Awake, Alert, Oriented to person, place, refused to answer time. Speech clear, fluent. Follows simple commands.     Cranial nerves - Difficult to assess pupillary and EOM as patient refusing exam, however pupils grossly round and symmetric, right eye EOMI, left eye all EOM impaired, VFF, face sensation (V1-V3) intact b/l, facial strength intact without asymmetry b/l, hearing intact b/l, palate with symmetric elevation, trapezius  5/5 strength b/l, tongue midline on protrusion with full lateral movement    Motor - Normal bulk and tone throughout.  Strength testing              Biceps      Triceps           R            5                 5               5                                               L             5                 5               5                                                             Hip Flexion    Hip Extension     Dorsiflexion    Plantar Flexion  R              5                           5                       5                           5                           L              5                           5                        5                           5                              Sensation - Light touch intact throughout    DTR's -             Biceps      Triceps     Brachioradialis      Patellar    Ankle    Toes/plantar response  R             2+             2+                  2+                       2+            2+                 Down  L              2+             2+                 2+                        2+           2+                 Down    Coordination - Finger to Nose intact b/l. No tremors appreciated    Gait and station - Deffered due to fall/safety risk     Labs:                        12.5   10.13 )-----------( 352      ( 23 Nov 2023 16:12 )             38.2     11-23    140  |  104  |  13  ----------------------------<  93  3.7   |  24  |  0.52    Ca    9.4      23 Nov 2023 16:12    TPro  7.6  /  Alb  4.2  /  TBili  <0.2  /  DBili  x   /  AST  19  /  ALT  11  /  AlkPhos  110  11-23    CAPILLARY BLOOD GLUCOSE        LIVER FUNCTIONS - ( 23 Nov 2023 16:12 )  Alb: 4.2 g/dL / Pro: 7.6 g/dL / ALK PHOS: 110 U/L / ALT: 11 U/L / AST: 19 U/L / GGT: x             PT/INR - ( 23 Nov 2023 16:12 )   PT: 11.5 sec;   INR: 1.03 ratio         PTT - ( 23 Nov 2023 16:12 )  PTT:32.4 sec        Radiology:  CT Head No Cont:  (23 Nov 2023 17:00)    < from: CT Angio Head w/ IV Cont (11.23.23 @ 22:18) >  CTA BRAIN:  1. 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  2. 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 This could be related to extrinsic mass effect from the dural   based mass in the left middle cranial fossa and/or parasitized flow to   the mass.    An MRI brain with and without contrast with MRA is recommended.      CTA NECK: No flow-limiting stenosis or occlusion. Gradual diminished   enhancement of the left internal carotid artery beginning at the C2 level   and extending cranially.     Neurology - Consult Note    -  Spectra: [phone] (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 Neurology consulted for dizziness for several weeks. Patient desribes dizziness as light headedness, denies room spinning dizziness. She reports having lightheadedness occasionally only when she goes from seated to standing position. Today she fell after tripping on her slipper in her kitchen, denies LOC. She then developed left sided cheat pain, which prompted her to come to the ED. She also had chronic left eye blurry vision after radiation of meningioma. Denies headache, weakness, numbness, changes to speech.   Of note, meningioma was found in 2016 and found to have grown in size. She underwent radiation treatment in 08/23. She reports having an MRI in last month showing stability of meningioma.     Review of Systems:   All other review of systems is negative unless indicated above.    Allergies:  penicillin (Rash)      PMHx/PSHx/Family Hx: As above, otherwise see below   Osteoporosis    H/O: varicose veins    Meningioma        Social Hx:  No current use of tobacco, alcohol, or illicit drugs      Medications:  MEDICATIONS  (STANDING):  influenza  Vaccine (HIGH DOSE) 0.7 milliLiter(s) IntraMuscular once    MEDICATIONS  (PRN):  acetaminophen     Tablet .. 650 milliGRAM(s) Oral every 6 hours PRN Mild Pain (1 - 3)  melatonin 3 milliGRAM(s) Oral at bedtime PRN Insomnia  oxyCODONE    IR 2.5 milliGRAM(s) Oral every 6 hours PRN Moderate Pain (4 - 6)  oxyCODONE    IR 5 milliGRAM(s) Oral every 6 hours PRN Severe Pain (7 - 10)      Vitals:  T(C): 36.3 (11-24-23 @ 01:00), Max: 37.1 (11-24-23 @ 00:23)  HR: 61 (11-24-23 @ 01:00) (61 - 75)  BP: 127/61 (11-24-23 @ 01:00) (116/98 - 157/73)  RR: 17 (11-24-23 @ 01:00) (16 - 17)  SpO2: 99% (11-24-23 @ 01:00) (97% - 100%)    Physical Examination:   General - Sleeping  Limited exam as patient is sleeping     Neurologic Exam:  Mental status - Awake, Alert, Oriented to person, place, refused to answer time. Speech clear, fluent. Follows simple commands.     Cranial nerves - Difficult to assess pupillary and EOM as patient refusing exam, however pupils grossly round and symmetric, right eye EOMI, left eye all EOM impaired, VFF, face sensation (V1-V3) intact b/l, facial strength intact without asymmetry b/l, hearing intact b/l, palate with symmetric elevation, trapezius  5/5 strength b/l, tongue midline on protrusion with full lateral movement    Motor - Normal bulk and tone throughout.  Strength testing              Biceps      Triceps           R            5                 5               5                                               L             5                 5               5                                                             Hip Flexion    Hip Extension     Dorsiflexion    Plantar Flexion  R              5                           5                       5                           5                           L              5                           5                        5                           5                              Sensation - Light touch intact throughout    DTR's -             Biceps      Triceps     Brachioradialis      Patellar    Ankle    Toes/plantar response  R             2+             2+                  2+                       2+            2+                 Down  L              2+             2+                 2+                        2+           2+                 Down    Coordination - Finger to Nose intact b/l. No tremors appreciated    Gait and station - Deffered due to fall/safety risk     Labs:                        12.5   10.13 )-----------( 352      ( 23 Nov 2023 16:12 )             38.2     11-23    140  |  104  |  13  ----------------------------<  93  3.7   |  24  |  0.52    Ca    9.4      23 Nov 2023 16:12    TPro  7.6  /  Alb  4.2  /  TBili  <0.2  /  DBili  x   /  AST  19  /  ALT  11  /  AlkPhos  110  11-23    CAPILLARY BLOOD GLUCOSE        LIVER FUNCTIONS - ( 23 Nov 2023 16:12 )  Alb: 4.2 g/dL / Pro: 7.6 g/dL / ALK PHOS: 110 U/L / ALT: 11 U/L / AST: 19 U/L / GGT: x             PT/INR - ( 23 Nov 2023 16:12 )   PT: 11.5 sec;   INR: 1.03 ratio         PTT - ( 23 Nov 2023 16:12 )  PTT:32.4 sec        Radiology:  CT Head No Cont:  (23 Nov 2023 17:00)    < from: CT Angio Head w/ IV Cont (11.23.23 @ 22:18) >  CTA BRAIN:  1. 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  2. 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 This could be related to extrinsic mass effect from the dural   based mass in the left middle cranial fossa and/or parasitized flow to   the mass.    An MRI brain with and without contrast with MRA is recommended.      CTA NECK: No flow-limiting stenosis or occlusion. Gradual diminished   enhancement of the left internal carotid artery beginning at the C2 level   and extending cranially.

## 2023-11-24 NOTE — H&P ADULT - NSHPLABSRESULTS_GEN_ALL_CORE
Labs personally reviewed.                        11.8   7.41  )-----------( 323      ( 24 Nov 2023 05:15 )             35.3     11-24    140  |  105  |  10  ----------------------------<  72  3.7   |  23  |  0.59    Ca    8.7      24 Nov 2023 05:15    TPro  6.4  /  Alb  3.6  /  TBili  0.3  /  DBili  x   /  AST  15  /  ALT  10  /  AlkPhos  91  11-24    Imaging personally reviewed.  ACC: [id number] EXAM:  CT ANGIO BRAIN (W)AW IC   ORDERED BY: ERIN   SINFAYE   ACC: [id number] EXAM:  CT ANGIO NECK (W)AW IC   ORDERED BY: ERIN WonderswampFAYE   PROCEDURE DATE:  11/23/2023      IMPRESSION:    CTA BRAIN:  1. 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  2. 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 This could be related to extrinsic mass effect from the dural   based mass in the left middle cranial fossa and/or parasitized flow to   the mass.    An MRI brain with and without contrast with MRA is recommended.      CTA NECK: No flow-limiting stenosis or occlusion. Gradual diminished   enhancement of the left internal carotid artery beginning at the C2 level   and extending cranially.    NASCET CAROTID STENOSIS CRITERIA (distal normal appearing ICA as   denominator for measurement): 0%-none, 1-49%-mild, 50-70%-moderate,   70-89%-severe, 90-99%-critical.    ACC: [id number] EXAM:  CT BRAIN   ORDERED BY: ERIN SINFAYE     PROCEDURE DATE:  11/23/2023    IMPRESSION: No acute intracranial hemorrhage.    Suspected meningioma adjacent to the left cavernous sinus. This can be   more optimally evaluated with a contrast-enhanced MRI study of the brain,   if clinically warranted, and if there are no clinical contraindications.    ACC: [id number] EXAM:  CT CHEST   ORDERED BY: ERIN SINFAYE   PROCEDURE DATE:  11/23/2023      FINDINGS:    Tubes/Lines: None.    Lungs, airways and pleura: No pneumothorax. Small left pleural effusion   likely a simple fluid density. Bilateral lower lobe, right middle lobe,   and lingular linear atelectasis.    Mediastinum: The thyroid gland is normal. No enlarged chest. Unremarkable   esophagus. The heart is enlarged. No pericardial effusion. The aorta is   tortuous and ectatic.    Upper Abdomen: Cholelithiasis. Diverticulosis.    Bones And Soft Tissues: The left lateral fourth through seventh ribs are   fractured. Spondylosis of thethoracic spine. The bones are qualitatively   osteopenic.  The soft tissues are unremarkable.      IMPRESSION:    1.  Left lateral fourth through seventh rib fractures.  2.  No pneumothorax.  3.  Small pleural effusion likely of simple fluid density.  4.  Cholelithiasis. Labs personally reviewed.                        11.8   7.41  )-----------( 323      ( 24 Nov 2023 05:15 )             35.3     11-24    140  |  105  |  10  ----------------------------<  72  3.7   |  23  |  0.59    Ca    8.7      24 Nov 2023 05:15    TPro  6.4  /  Alb  3.6  /  TBili  0.3  /  DBili  x   /  AST  15  /  ALT  10  /  AlkPhos  91  11-24    Imaging personally reviewed.  ACC: [id number] EXAM:  CT ANGIO BRAIN (W)AW IC   ORDERED BY: ERIN   SINFAYE   ACC: [id number] EXAM:  CT ANGIO NECK (W)AW IC   ORDERED BY: ERIN AirseedFAYE   PROCEDURE DATE:  11/23/2023      IMPRESSION:    CTA BRAIN:  1. 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  2. 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 This could be related to extrinsic mass effect from the dural   based mass in the left middle cranial fossa and/or parasitized flow to   the mass.    An MRI brain with and without contrast with MRA is recommended.      CTA NECK: No flow-limiting stenosis or occlusion. Gradual diminished   enhancement of the left internal carotid artery beginning at the C2 level   and extending cranially.    NASCET CAROTID STENOSIS CRITERIA (distal normal appearing ICA as   denominator for measurement): 0%-none, 1-49%-mild, 50-70%-moderate,   70-89%-severe, 90-99%-critical.    ACC: [id number] EXAM:  CT BRAIN   ORDERED BY: ERIN SINFAYE     PROCEDURE DATE:  11/23/2023    IMPRESSION: No acute intracranial hemorrhage.    Suspected meningioma adjacent to the left cavernous sinus. This can be   more optimally evaluated with a contrast-enhanced MRI study of the brain,   if clinically warranted, and if there are no clinical contraindications.    ACC: [id number] EXAM:  CT CHEST   ORDERED BY: ERIN SINFAYE   PROCEDURE DATE:  11/23/2023      FINDINGS:    Tubes/Lines: None.    Lungs, airways and pleura: No pneumothorax. Small left pleural effusion   likely a simple fluid density. Bilateral lower lobe, right middle lobe,   and lingular linear atelectasis.    Mediastinum: The thyroid gland is normal. No enlarged chest. Unremarkable   esophagus. The heart is enlarged. No pericardial effusion. The aorta is   tortuous and ectatic.    Upper Abdomen: Cholelithiasis. Diverticulosis.    Bones And Soft Tissues: The left lateral fourth through seventh ribs are   fractured. Spondylosis of thethoracic spine. The bones are qualitatively   osteopenic.  The soft tissues are unremarkable.      IMPRESSION:    1.  Left lateral fourth through seventh rib fractures.  2.  No pneumothorax.  3.  Small pleural effusion likely of simple fluid density.  4.  Cholelithiasis. Labs personally reviewed.                        11.8   7.41  )-----------( 323      ( 24 Nov 2023 05:15 )             35.3     11-24    140  |  105  |  10  ----------------------------<  72  3.7   |  23  |  0.59    Ca    8.7      24 Nov 2023 05:15    TPro  6.4  /  Alb  3.6  /  TBili  0.3  /  DBili  x   /  AST  15  /  ALT  10  /  AlkPhos  91  11-24    Imaging personally reviewed.  ACC: [id number] EXAM:  CT ANGIO BRAIN (W)AW IC   ORDERED BY: ERIN   SINFAYE   ACC: [id number] EXAM:  CT ANGIO NECK (W)AW IC   ORDERED BY: ERIN FeedVisorFAYE   PROCEDURE DATE:  11/23/2023      IMPRESSION:    CTA BRAIN:  1. 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  2. 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 This could be related to extrinsic mass effect from the dural   based mass in the left middle cranial fossa and/or parasitized flow to   the mass.    An MRI brain with and without contrast with MRA is recommended.      CTA NECK: No flow-limiting stenosis or occlusion. Gradual diminished   enhancement of the left internal carotid artery beginning at the C2 level   and extending cranially.    NASCET CAROTID STENOSIS CRITERIA (distal normal appearing ICA as   denominator for measurement): 0%-none, 1-49%-mild, 50-70%-moderate,   70-89%-severe, 90-99%-critical.    ACC: [id number] EXAM:  CT BRAIN   ORDERED BY: ERIN SINFAYE     PROCEDURE DATE:  11/23/2023    IMPRESSION: No acute intracranial hemorrhage.    Suspected meningioma adjacent to the left cavernous sinus. This can be   more optimally evaluated with a contrast-enhanced MRI study of the brain,   if clinically warranted, and if there are no clinical contraindications.    ACC: [id number] EXAM:  CT CHEST   ORDERED BY: ERIN SINFAYE   PROCEDURE DATE:  11/23/2023      FINDINGS:    Tubes/Lines: None.    Lungs, airways and pleura: No pneumothorax. Small left pleural effusion   likely a simple fluid density. Bilateral lower lobe, right middle lobe,   and lingular linear atelectasis.    Mediastinum: The thyroid gland is normal. No enlarged chest. Unremarkable   esophagus. The heart is enlarged. No pericardial effusion. The aorta is   tortuous and ectatic.    Upper Abdomen: Cholelithiasis. Diverticulosis.    Bones And Soft Tissues: The left lateral fourth through seventh ribs are   fractured. Spondylosis of thethoracic spine. The bones are qualitatively   osteopenic.  The soft tissues are unremarkable.      IMPRESSION:    1.  Left lateral fourth through seventh rib fractures.  2.  No pneumothorax.  3.  Small pleural effusion likely of simple fluid density.  4.  Cholelithiasis.

## 2023-11-25 LAB
24R-OH-CALCIDIOL SERPL-MCNC: 26.8 NG/ML — LOW (ref 30–80)
24R-OH-CALCIDIOL SERPL-MCNC: 26.8 NG/ML — LOW (ref 30–80)
A1C WITH ESTIMATED AVERAGE GLUCOSE RESULT: 5.5 % — SIGNIFICANT CHANGE UP (ref 4–5.6)
A1C WITH ESTIMATED AVERAGE GLUCOSE RESULT: 5.5 % — SIGNIFICANT CHANGE UP (ref 4–5.6)
ANION GAP SERPL CALC-SCNC: 11 MMOL/L — SIGNIFICANT CHANGE UP (ref 7–14)
ANION GAP SERPL CALC-SCNC: 11 MMOL/L — SIGNIFICANT CHANGE UP (ref 7–14)
BUN SERPL-MCNC: 18 MG/DL — SIGNIFICANT CHANGE UP (ref 7–23)
BUN SERPL-MCNC: 18 MG/DL — SIGNIFICANT CHANGE UP (ref 7–23)
CALCIUM SERPL-MCNC: 8.8 MG/DL — SIGNIFICANT CHANGE UP (ref 8.4–10.5)
CALCIUM SERPL-MCNC: 8.8 MG/DL — SIGNIFICANT CHANGE UP (ref 8.4–10.5)
CHLORIDE SERPL-SCNC: 107 MMOL/L — SIGNIFICANT CHANGE UP (ref 98–107)
CHLORIDE SERPL-SCNC: 107 MMOL/L — SIGNIFICANT CHANGE UP (ref 98–107)
CHOLEST SERPL-MCNC: 169 MG/DL — SIGNIFICANT CHANGE UP
CHOLEST SERPL-MCNC: 169 MG/DL — SIGNIFICANT CHANGE UP
CO2 SERPL-SCNC: 24 MMOL/L — SIGNIFICANT CHANGE UP (ref 22–31)
CO2 SERPL-SCNC: 24 MMOL/L — SIGNIFICANT CHANGE UP (ref 22–31)
CREAT SERPL-MCNC: 0.58 MG/DL — SIGNIFICANT CHANGE UP (ref 0.5–1.3)
CREAT SERPL-MCNC: 0.58 MG/DL — SIGNIFICANT CHANGE UP (ref 0.5–1.3)
EGFR: 94 ML/MIN/1.73M2 — SIGNIFICANT CHANGE UP
EGFR: 94 ML/MIN/1.73M2 — SIGNIFICANT CHANGE UP
ESTIMATED AVERAGE GLUCOSE: 111 — SIGNIFICANT CHANGE UP
ESTIMATED AVERAGE GLUCOSE: 111 — SIGNIFICANT CHANGE UP
GLUCOSE SERPL-MCNC: 89 MG/DL — SIGNIFICANT CHANGE UP (ref 70–99)
GLUCOSE SERPL-MCNC: 89 MG/DL — SIGNIFICANT CHANGE UP (ref 70–99)
HCT VFR BLD CALC: 38.7 % — SIGNIFICANT CHANGE UP (ref 34.5–45)
HCT VFR BLD CALC: 38.7 % — SIGNIFICANT CHANGE UP (ref 34.5–45)
HDLC SERPL-MCNC: 53 MG/DL — SIGNIFICANT CHANGE UP
HDLC SERPL-MCNC: 53 MG/DL — SIGNIFICANT CHANGE UP
HGB BLD-MCNC: 12.6 G/DL — SIGNIFICANT CHANGE UP (ref 11.5–15.5)
HGB BLD-MCNC: 12.6 G/DL — SIGNIFICANT CHANGE UP (ref 11.5–15.5)
LIPID PNL WITH DIRECT LDL SERPL: 105 MG/DL — HIGH
LIPID PNL WITH DIRECT LDL SERPL: 105 MG/DL — HIGH
MAGNESIUM SERPL-MCNC: 2.4 MG/DL — SIGNIFICANT CHANGE UP (ref 1.6–2.6)
MAGNESIUM SERPL-MCNC: 2.4 MG/DL — SIGNIFICANT CHANGE UP (ref 1.6–2.6)
MCHC RBC-ENTMCNC: 26.5 PG — LOW (ref 27–34)
MCHC RBC-ENTMCNC: 26.5 PG — LOW (ref 27–34)
MCHC RBC-ENTMCNC: 32.6 GM/DL — SIGNIFICANT CHANGE UP (ref 32–36)
MCHC RBC-ENTMCNC: 32.6 GM/DL — SIGNIFICANT CHANGE UP (ref 32–36)
MCV RBC AUTO: 81.3 FL — SIGNIFICANT CHANGE UP (ref 80–100)
MCV RBC AUTO: 81.3 FL — SIGNIFICANT CHANGE UP (ref 80–100)
NON HDL CHOLESTEROL: 116 MG/DL — SIGNIFICANT CHANGE UP
NON HDL CHOLESTEROL: 116 MG/DL — SIGNIFICANT CHANGE UP
NRBC # BLD: 0 /100 WBCS — SIGNIFICANT CHANGE UP (ref 0–0)
NRBC # BLD: 0 /100 WBCS — SIGNIFICANT CHANGE UP (ref 0–0)
NRBC # FLD: 0 K/UL — SIGNIFICANT CHANGE UP (ref 0–0)
NRBC # FLD: 0 K/UL — SIGNIFICANT CHANGE UP (ref 0–0)
PHOSPHATE SERPL-MCNC: 2.7 MG/DL — SIGNIFICANT CHANGE UP (ref 2.5–4.5)
PHOSPHATE SERPL-MCNC: 2.7 MG/DL — SIGNIFICANT CHANGE UP (ref 2.5–4.5)
PLATELET # BLD AUTO: 344 K/UL — SIGNIFICANT CHANGE UP (ref 150–400)
PLATELET # BLD AUTO: 344 K/UL — SIGNIFICANT CHANGE UP (ref 150–400)
POTASSIUM SERPL-MCNC: 4 MMOL/L — SIGNIFICANT CHANGE UP (ref 3.5–5.3)
POTASSIUM SERPL-MCNC: 4 MMOL/L — SIGNIFICANT CHANGE UP (ref 3.5–5.3)
POTASSIUM SERPL-SCNC: 4 MMOL/L — SIGNIFICANT CHANGE UP (ref 3.5–5.3)
POTASSIUM SERPL-SCNC: 4 MMOL/L — SIGNIFICANT CHANGE UP (ref 3.5–5.3)
RBC # BLD: 4.76 M/UL — SIGNIFICANT CHANGE UP (ref 3.8–5.2)
RBC # BLD: 4.76 M/UL — SIGNIFICANT CHANGE UP (ref 3.8–5.2)
RBC # FLD: 14.3 % — SIGNIFICANT CHANGE UP (ref 10.3–14.5)
RBC # FLD: 14.3 % — SIGNIFICANT CHANGE UP (ref 10.3–14.5)
SODIUM SERPL-SCNC: 142 MMOL/L — SIGNIFICANT CHANGE UP (ref 135–145)
SODIUM SERPL-SCNC: 142 MMOL/L — SIGNIFICANT CHANGE UP (ref 135–145)
T4 FREE SERPL-MCNC: 1.3 NG/DL — SIGNIFICANT CHANGE UP (ref 0.9–1.8)
T4 FREE SERPL-MCNC: 1.3 NG/DL — SIGNIFICANT CHANGE UP (ref 0.9–1.8)
TRIGL SERPL-MCNC: 57 MG/DL — SIGNIFICANT CHANGE UP
TRIGL SERPL-MCNC: 57 MG/DL — SIGNIFICANT CHANGE UP
TSH SERPL-MCNC: 1.57 UIU/ML — SIGNIFICANT CHANGE UP (ref 0.27–4.2)
TSH SERPL-MCNC: 1.57 UIU/ML — SIGNIFICANT CHANGE UP (ref 0.27–4.2)
WBC # BLD: 9.75 K/UL — SIGNIFICANT CHANGE UP (ref 3.8–10.5)
WBC # BLD: 9.75 K/UL — SIGNIFICANT CHANGE UP (ref 3.8–10.5)
WBC # FLD AUTO: 9.75 K/UL — SIGNIFICANT CHANGE UP (ref 3.8–10.5)
WBC # FLD AUTO: 9.75 K/UL — SIGNIFICANT CHANGE UP (ref 3.8–10.5)

## 2023-11-25 PROCEDURE — 99233 SBSQ HOSP IP/OBS HIGH 50: CPT | Mod: GC

## 2023-11-25 RX ORDER — LIDOCAINE 4 G/100G
1 CREAM TOPICAL DAILY
Refills: 0 | Status: DISCONTINUED | OUTPATIENT
Start: 2023-11-25 | End: 2023-11-29

## 2023-11-25 RX ORDER — CHOLECALCIFEROL (VITAMIN D3) 125 MCG
1000 CAPSULE ORAL DAILY
Refills: 0 | Status: DISCONTINUED | OUTPATIENT
Start: 2023-11-25 | End: 2023-11-29

## 2023-11-25 RX ADMIN — OXYCODONE HYDROCHLORIDE 5 MILLIGRAM(S): 5 TABLET ORAL at 10:24

## 2023-11-25 RX ADMIN — LIDOCAINE 1 PATCH: 4 CREAM TOPICAL at 19:02

## 2023-11-25 RX ADMIN — Medication 1000 UNIT(S): at 12:47

## 2023-11-25 RX ADMIN — OXYCODONE HYDROCHLORIDE 5 MILLIGRAM(S): 5 TABLET ORAL at 21:00

## 2023-11-25 RX ADMIN — LIDOCAINE 1 PATCH: 4 CREAM TOPICAL at 12:41

## 2023-11-25 RX ADMIN — ENOXAPARIN SODIUM 40 MILLIGRAM(S): 100 INJECTION SUBCUTANEOUS at 05:25

## 2023-11-25 RX ADMIN — Medication 650 MILLIGRAM(S): at 12:46

## 2023-11-25 RX ADMIN — OXYCODONE HYDROCHLORIDE 5 MILLIGRAM(S): 5 TABLET ORAL at 20:37

## 2023-11-25 RX ADMIN — LIDOCAINE 1 PATCH: 4 CREAM TOPICAL at 20:53

## 2023-11-25 RX ADMIN — Medication 650 MILLIGRAM(S): at 13:38

## 2023-11-25 RX ADMIN — OXYCODONE HYDROCHLORIDE 5 MILLIGRAM(S): 5 TABLET ORAL at 08:55

## 2023-11-25 NOTE — PROGRESS NOTE ADULT - PROBLEM SELECTOR PLAN 3
MRI shows Asymmetrically diminished flow throughout the intracranial left ICA, with string sign/near occlusion throughout the cavernous, clinoid and supraclinoid levels, with reconstitution in the communicating segment.  Homogeneously enhancing dural based mass measures 2.9 x 2.5 cm, obscures the cavernous sinus, extends into the sella and suprasellar cistern and also appears to involve the proximal left optic nerve sheath via the supraorbital foramen  - Patient aware of diagnosis and has proper outpatient follow-up  - Obtain out-patient records in AM in regards to occlusion of ICA  - Neurosurgery consult due to new complete occlusion of ICA  - Neurology consult  - F/u MRI brain w/without and MRA brain.

## 2023-11-25 NOTE — PROGRESS NOTE ADULT - PROBLEM SELECTOR PLAN 2
Left 4-7th rib fractures with associated pain and shallower breaths. CT Chest pertinent for bilateral lower lobe, right middle lobe, and lingular linear atelectasis.    - Thoracic Surgery consulted, no acute interventions at this time  - Incentive spirometry explained and encouraged  - Pain control: Tylenol q6 PRN for 1-3 pain, Oxycodone IR 2.5 PRN for 4-7 pain, Oxycodone IR 5 for 8-10  - F/u with Dr Danny Person in 3-4 weeks with repeat CXR. Left 4-7th rib fractures with associated pain and shallower breaths. CT Chest pertinent for bilateral lower lobe, right middle lobe, and lingular linear atelectasis.    - Thoracic Surgery consulted, no acute interventions at this time  - Incentive spirometry explained and encouraged  - Pain control: Tylenol q6 PRN for 1-3 pain, Oxycodone IR 2.5 PRN for 4-7 pain, Oxycodone IR 5 for 8-10  - F/u with Dr Danny Person in 3-4 weeks with repeat CXR.  - Lidocaine patch

## 2023-11-25 NOTE — PROGRESS NOTE ADULT - SUBJECTIVE AND OBJECTIVE BOX
***************************************************************  Geoff Travis, PG1  Internal Medicine   Pager:  TEAMS  ***************************************************************    KIM GARRISON  78y  MRN: [id number]    Patient is a 78y old  Female who presents with a chief complaint of Dizziness (25 Nov 2023 06:24)      Interval/Overnight Events: no events ON.     Subjective: Pt seen and examined at bedside. Denies fever, SOB, abn pain, N/V, dysuria. Pain is controlled on pain regimen.  Using incentive spirometer.    MEDICATIONS  (STANDING):  enoxaparin Injectable 40 milliGRAM(s) SubCutaneous every 24 hours  influenza  Vaccine (HIGH DOSE) 0.7 milliLiter(s) IntraMuscular once    MEDICATIONS  (PRN):  acetaminophen     Tablet .. 650 milliGRAM(s) Oral every 6 hours PRN Mild Pain (1 - 3)  meclizine 25 milliGRAM(s) Oral every 8 hours PRN Dizziness  melatonin 3 milliGRAM(s) Oral at bedtime PRN Insomnia  oxyCODONE    IR 2.5 milliGRAM(s) Oral every 6 hours PRN Moderate Pain (4 - 6)  oxyCODONE    IR 5 milliGRAM(s) Oral every 6 hours PRN Severe Pain (7 - 10)      Objective:    Vitals: Vital Signs Last 24 Hrs  T(C): 36.8 (11-25-23 @ 05:25), Max: 36.8 (11-24-23 @ 22:00)  T(F): 98.2 (11-25-23 @ 05:25), Max: 98.3 (11-24-23 @ 22:00)  HR: 70 (11-25-23 @ 05:25) (69 - 72)  BP: 138/72 (11-25-23 @ 05:25) (134/70 - 138/75)  BP(mean): --  RR: 18 (11-25-23 @ 05:25) (18 - 18)  SpO2: 98% (11-25-23 @ 05:25) (97% - 98%)                I&O's Summary      PHYSICAL EXAM:  GENERAL: NAD  HEAD:  Atraumatic, Normocephalic  EYES: EOMI except for left eye abduction, conjunctiva and sclera clear  CHEST/LUNG: Clear to auscultation bilaterally; No rales, rhonchi, wheezing, or rubs.  L midaxillary line ribs TTP  HEART: Regular rate and rhythm; No murmurs, rubs, or gallops  ABDOMEN: Soft, Nontender, Nondistended;   SKIN: No rashes or lesions  NERVOUS SYSTEM:  Alert & Oriented X3, no focal deficits    LABS:                        12.6   9.75  )-----------( 344      ( 25 Nov 2023 06:35 )             38.7                         11.8   7.41  )-----------( 323      ( 24 Nov 2023 05:15 )             35.3                         12.5   10.13 )-----------( 352      ( 23 Nov 2023 16:12 )             38.2     11-25    142  |  107  |  18  ----------------------------<  89  4.0   |  24  |  0.58  11-24    140  |  105  |  10  ----------------------------<  72  3.7   |  23  |  0.59  11-23    140  |  104  |  13  ----------------------------<  93  3.7   |  24  |  0.52    Ca    8.8      25 Nov 2023 06:35  Ca    8.7      24 Nov 2023 05:15  Ca    9.4      23 Nov 2023 16:12  Phos  2.7     11-25  Mg     2.40     11-25    TPro  6.4  /  Alb  3.6  /  TBili  0.3  /  DBili  x   /  AST  15  /  ALT  10  /  AlkPhos  91  11-24  TPro  7.6  /  Alb  4.2  /  TBili  <0.2  /  DBili  x   /  AST  19  /  ALT  11  /  AlkPhos  110  11-23    CAPILLARY BLOOD GLUCOSE      POCT Blood Glucose.: 83 mg/dL (24 Nov 2023 12:07)    PT/INR - ( 23 Nov 2023 16:12 )   PT: 11.5 sec;   INR: 1.03 ratio         PTT - ( 23 Nov 2023 16:12 )  PTT:32.4 sec    Urinalysis Basic - ( 25 Nov 2023 06:35 )    Color: x / Appearance: x / SG: x / pH: x  Gluc: 89 mg/dL / Ketone: x  / Bili: x / Urobili: x   Blood: x / Protein: x / Nitrite: x   Leuk Esterase: x / RBC: x / WBC x   Sq Epi: x / Non Sq Epi: x / Bacteria: x          RADIOLOGY & ADDITIONAL TESTS:    Imaging Personally Reviewed:  [x ] YES  [ ] NO    Consultants involved in case:   Consultant(s) Notes Reviewed:  [ x] YES  [ ] NO:   Care Discussed with Consultants/Other Providers [x ] YES  [ ] NO         ***************************************************************  Geoff Travis, PG1  Internal Medicine   Pager:  TEAMS  ***************************************************************    KIM AGRRISON  78y  MRN: [id number]    Patient is a 78y old  Female who presents with a chief complaint of Dizziness (25 Nov 2023 06:24)      Interval/Overnight Events: no events ON.     Subjective: Pt seen and examined at bedside. Denies fever, SOB, abn pain, N/V, dysuria. Pain is controlled on pain regimen.  Using incentive spirometer.    MEDICATIONS  (STANDING):  enoxaparin Injectable 40 milliGRAM(s) SubCutaneous every 24 hours  influenza  Vaccine (HIGH DOSE) 0.7 milliLiter(s) IntraMuscular once    MEDICATIONS  (PRN):  acetaminophen     Tablet .. 650 milliGRAM(s) Oral every 6 hours PRN Mild Pain (1 - 3)  meclizine 25 milliGRAM(s) Oral every 8 hours PRN Dizziness  melatonin 3 milliGRAM(s) Oral at bedtime PRN Insomnia  oxyCODONE    IR 2.5 milliGRAM(s) Oral every 6 hours PRN Moderate Pain (4 - 6)  oxyCODONE    IR 5 milliGRAM(s) Oral every 6 hours PRN Severe Pain (7 - 10)      Objective:    Vitals: Vital Signs Last 24 Hrs  T(C): 36.8 (11-25-23 @ 05:25), Max: 36.8 (11-24-23 @ 22:00)  T(F): 98.2 (11-25-23 @ 05:25), Max: 98.3 (11-24-23 @ 22:00)  HR: 70 (11-25-23 @ 05:25) (69 - 72)  BP: 138/72 (11-25-23 @ 05:25) (134/70 - 138/75)  BP(mean): --  RR: 18 (11-25-23 @ 05:25) (18 - 18)  SpO2: 98% (11-25-23 @ 05:25) (97% - 98%)                I&O's Summary      PHYSICAL EXAM:  GENERAL: NAD  HEAD:  Atraumatic, Normocephalic  EYES: EOMI except for left eye abduction, conjunctiva and sclera clear  CHEST/LUNG: Clear to auscultation bilaterally; No rales, rhonchi, wheezing, or rubs.  L midaxillary line ribs TTP  HEART: Regular rate and rhythm; No murmurs, rubs, or gallops  ABDOMEN: Soft, Nontender, Nondistended;   SKIN: No rashes or lesions  NERVOUS SYSTEM:  Alert & Oriented X3, no focal deficits    LABS:                        12.6   9.75  )-----------( 344      ( 25 Nov 2023 06:35 )             38.7                         11.8   7.41  )-----------( 323      ( 24 Nov 2023 05:15 )             35.3                         12.5   10.13 )-----------( 352      ( 23 Nov 2023 16:12 )             38.2     11-25    142  |  107  |  18  ----------------------------<  89  4.0   |  24  |  0.58  11-24    140  |  105  |  10  ----------------------------<  72  3.7   |  23  |  0.59  11-23    140  |  104  |  13  ----------------------------<  93  3.7   |  24  |  0.52    Ca    8.8      25 Nov 2023 06:35  Ca    8.7      24 Nov 2023 05:15  Ca    9.4      23 Nov 2023 16:12  Phos  2.7     11-25  Mg     2.40     11-25    TPro  6.4  /  Alb  3.6  /  TBili  0.3  /  DBili  x   /  AST  15  /  ALT  10  /  AlkPhos  91  11-24  TPro  7.6  /  Alb  4.2  /  TBili  <0.2  /  DBili  x   /  AST  19  /  ALT  11  /  AlkPhos  110  11-23    CAPILLARY BLOOD GLUCOSE      POCT Blood Glucose.: 83 mg/dL (24 Nov 2023 12:07)    PT/INR - ( 23 Nov 2023 16:12 )   PT: 11.5 sec;   INR: 1.03 ratio         PTT - ( 23 Nov 2023 16:12 )  PTT:32.4 sec    Urinalysis Basic - ( 25 Nov 2023 06:35 )    Color: x / Appearance: x / SG: x / pH: x  Gluc: 89 mg/dL / Ketone: x  / Bili: x / Urobili: x   Blood: x / Protein: x / Nitrite: x   Leuk Esterase: x / RBC: x / WBC x   Sq Epi: x / Non Sq Epi: x / Bacteria: x          RADIOLOGY & ADDITIONAL TESTS:    Imaging Personally Reviewed:  [x ] YES  [ ] NO    Consultants involved in case:   Consultant(s) Notes Reviewed:  [ x] YES  [ ] NO:   Care Discussed with Consultants/Other Providers [x ] YES  [ ] NO         ***************************************************************  Geoff Travis, PG1  Internal Medicine   Pager:  TEAMS  ***************************************************************    KIM GARRISON  78y  MRN: [id number]    Patient is a 78y old  Female who presents with a chief complaint of Dizziness (25 Nov 2023 06:24)      Interval/Overnight Events: no events ON.     Subjective: Pt seen and examined at bedside. Denies fever, SOB, abn pain, N/V, dysuria. Pain is controlled on pain regimen.  Using incentive spirometer.    MEDICATIONS  (STANDING):  enoxaparin Injectable 40 milliGRAM(s) SubCutaneous every 24 hours  influenza  Vaccine (HIGH DOSE) 0.7 milliLiter(s) IntraMuscular once    MEDICATIONS  (PRN):  acetaminophen     Tablet .. 650 milliGRAM(s) Oral every 6 hours PRN Mild Pain (1 - 3)  meclizine 25 milliGRAM(s) Oral every 8 hours PRN Dizziness  melatonin 3 milliGRAM(s) Oral at bedtime PRN Insomnia  oxyCODONE    IR 2.5 milliGRAM(s) Oral every 6 hours PRN Moderate Pain (4 - 6)  oxyCODONE    IR 5 milliGRAM(s) Oral every 6 hours PRN Severe Pain (7 - 10)      Objective:    Vitals: Vital Signs Last 24 Hrs  T(C): 36.8 (11-25-23 @ 05:25), Max: 36.8 (11-24-23 @ 22:00)  T(F): 98.2 (11-25-23 @ 05:25), Max: 98.3 (11-24-23 @ 22:00)  HR: 70 (11-25-23 @ 05:25) (69 - 72)  BP: 138/72 (11-25-23 @ 05:25) (134/70 - 138/75)  BP(mean): --  RR: 18 (11-25-23 @ 05:25) (18 - 18)  SpO2: 98% (11-25-23 @ 05:25) (97% - 98%)                I&O's Summary      PHYSICAL EXAM:  GENERAL: NAD  HEAD:  Atraumatic, Normocephalic  EYES: EOMI except for left eye abduction, conjunctiva and sclera clear  CHEST/LUNG: Clear to auscultation bilaterally; No rales, rhonchi, wheezing, or rubs.  L midaxillary line ribs TTP  HEART: Regular rate and rhythm; No murmurs, rubs, or gallops  ABDOMEN: Soft, Nontender, Nondistended;   SKIN: No rashes or lesions  NERVOUS SYSTEM:  Alert & Oriented X3, no focal deficits    LABS:                        12.6   9.75  )-----------( 344      ( 25 Nov 2023 06:35 )             38.7                         11.8   7.41  )-----------( 323      ( 24 Nov 2023 05:15 )             35.3                         12.5   10.13 )-----------( 352      ( 23 Nov 2023 16:12 )             38.2     11-25    142  |  107  |  18  ----------------------------<  89  4.0   |  24  |  0.58  11-24    140  |  105  |  10  ----------------------------<  72  3.7   |  23  |  0.59  11-23    140  |  104  |  13  ----------------------------<  93  3.7   |  24  |  0.52    Ca    8.8      25 Nov 2023 06:35  Ca    8.7      24 Nov 2023 05:15  Ca    9.4      23 Nov 2023 16:12  Phos  2.7     11-25  Mg     2.40     11-25    TPro  6.4  /  Alb  3.6  /  TBili  0.3  /  DBili  x   /  AST  15  /  ALT  10  /  AlkPhos  91  11-24  TPro  7.6  /  Alb  4.2  /  TBili  <0.2  /  DBili  x   /  AST  19  /  ALT  11  /  AlkPhos  110  11-23    CAPILLARY BLOOD GLUCOSE      POCT Blood Glucose.: 83 mg/dL (24 Nov 2023 12:07)    PT/INR - ( 23 Nov 2023 16:12 )   PT: 11.5 sec;   INR: 1.03 ratio         PTT - ( 23 Nov 2023 16:12 )  PTT:32.4 sec    Urinalysis Basic - ( 25 Nov 2023 06:35 )    Color: x / Appearance: x / SG: x / pH: x  Gluc: 89 mg/dL / Ketone: x  / Bili: x / Urobili: x   Blood: x / Protein: x / Nitrite: x   Leuk Esterase: x / RBC: x / WBC x   Sq Epi: x / Non Sq Epi: x / Bacteria: x          RADIOLOGY & ADDITIONAL TESTS:    Imaging Personally Reviewed:  [x ] YES  [ ] NO    Consultants involved in case:   Consultant(s) Notes Reviewed:  [ x] YES  [ ] NO:   Care Discussed with Consultants/Other Providers [x ] YES  [ ] NO

## 2023-11-25 NOTE — PROGRESS NOTE ADULT - PROBLEM SELECTOR PLAN 1
Per patient story, patient slipped on the ground and fell. Denies dizziness, chest palpitations, or LOC at the time.  Orthostatics OK.  EKG obtained in ED NSR.  TTE EF 59%, overall reassuring  - Monitor on tele  - PT Consult in. Per patient story, patient slipped on the ground and fell. Denies dizziness, chest palpitations, or LOC at the time.  Orthostatics OK.  EKG obtained in ED NSR.  TTE EF 59%, overall reassuring  - D/c tele (mechanical fall)  - PT Consult in.

## 2023-11-25 NOTE — PROGRESS NOTE ADULT - ATTENDING COMMENTS
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    Meningioma:  - concern for interval increase in size  - no focal neuro deficit at this time.  - Awaiting MR Brain  - appreciate NSx and Neurology consult    Left rib fractures:  - pain control with lidocaine patch, oxyIR PRN, incentive spirometry    Syncope:  - TTE reviewed - ok  - no dysrhythmia   - orthostatic negative  - ok to D/C tele  - PT eval for safe disposition

## 2023-11-26 LAB
ANION GAP SERPL CALC-SCNC: 9 MMOL/L — SIGNIFICANT CHANGE UP (ref 7–14)
ANION GAP SERPL CALC-SCNC: 9 MMOL/L — SIGNIFICANT CHANGE UP (ref 7–14)
BUN SERPL-MCNC: 16 MG/DL — SIGNIFICANT CHANGE UP (ref 7–23)
BUN SERPL-MCNC: 16 MG/DL — SIGNIFICANT CHANGE UP (ref 7–23)
CALCIUM SERPL-MCNC: 8.6 MG/DL — SIGNIFICANT CHANGE UP (ref 8.4–10.5)
CALCIUM SERPL-MCNC: 8.6 MG/DL — SIGNIFICANT CHANGE UP (ref 8.4–10.5)
CHLORIDE SERPL-SCNC: 107 MMOL/L — SIGNIFICANT CHANGE UP (ref 98–107)
CHLORIDE SERPL-SCNC: 107 MMOL/L — SIGNIFICANT CHANGE UP (ref 98–107)
CO2 SERPL-SCNC: 24 MMOL/L — SIGNIFICANT CHANGE UP (ref 22–31)
CO2 SERPL-SCNC: 24 MMOL/L — SIGNIFICANT CHANGE UP (ref 22–31)
CREAT SERPL-MCNC: 0.56 MG/DL — SIGNIFICANT CHANGE UP (ref 0.5–1.3)
CREAT SERPL-MCNC: 0.56 MG/DL — SIGNIFICANT CHANGE UP (ref 0.5–1.3)
EGFR: 95 ML/MIN/1.73M2 — SIGNIFICANT CHANGE UP
EGFR: 95 ML/MIN/1.73M2 — SIGNIFICANT CHANGE UP
GLUCOSE SERPL-MCNC: 89 MG/DL — SIGNIFICANT CHANGE UP (ref 70–99)
GLUCOSE SERPL-MCNC: 89 MG/DL — SIGNIFICANT CHANGE UP (ref 70–99)
HCT VFR BLD CALC: 38.9 % — SIGNIFICANT CHANGE UP (ref 34.5–45)
HCT VFR BLD CALC: 38.9 % — SIGNIFICANT CHANGE UP (ref 34.5–45)
HGB BLD-MCNC: 12.5 G/DL — SIGNIFICANT CHANGE UP (ref 11.5–15.5)
HGB BLD-MCNC: 12.5 G/DL — SIGNIFICANT CHANGE UP (ref 11.5–15.5)
MAGNESIUM SERPL-MCNC: 2.3 MG/DL — SIGNIFICANT CHANGE UP (ref 1.6–2.6)
MAGNESIUM SERPL-MCNC: 2.3 MG/DL — SIGNIFICANT CHANGE UP (ref 1.6–2.6)
MCHC RBC-ENTMCNC: 26.2 PG — LOW (ref 27–34)
MCHC RBC-ENTMCNC: 26.2 PG — LOW (ref 27–34)
MCHC RBC-ENTMCNC: 32.1 GM/DL — SIGNIFICANT CHANGE UP (ref 32–36)
MCHC RBC-ENTMCNC: 32.1 GM/DL — SIGNIFICANT CHANGE UP (ref 32–36)
MCV RBC AUTO: 81.4 FL — SIGNIFICANT CHANGE UP (ref 80–100)
MCV RBC AUTO: 81.4 FL — SIGNIFICANT CHANGE UP (ref 80–100)
NRBC # BLD: 0 /100 WBCS — SIGNIFICANT CHANGE UP (ref 0–0)
NRBC # BLD: 0 /100 WBCS — SIGNIFICANT CHANGE UP (ref 0–0)
NRBC # FLD: 0 K/UL — SIGNIFICANT CHANGE UP (ref 0–0)
NRBC # FLD: 0 K/UL — SIGNIFICANT CHANGE UP (ref 0–0)
PHOSPHATE SERPL-MCNC: 2.5 MG/DL — SIGNIFICANT CHANGE UP (ref 2.5–4.5)
PHOSPHATE SERPL-MCNC: 2.5 MG/DL — SIGNIFICANT CHANGE UP (ref 2.5–4.5)
PLATELET # BLD AUTO: 328 K/UL — SIGNIFICANT CHANGE UP (ref 150–400)
PLATELET # BLD AUTO: 328 K/UL — SIGNIFICANT CHANGE UP (ref 150–400)
POTASSIUM SERPL-MCNC: 4.1 MMOL/L — SIGNIFICANT CHANGE UP (ref 3.5–5.3)
POTASSIUM SERPL-MCNC: 4.1 MMOL/L — SIGNIFICANT CHANGE UP (ref 3.5–5.3)
POTASSIUM SERPL-SCNC: 4.1 MMOL/L — SIGNIFICANT CHANGE UP (ref 3.5–5.3)
POTASSIUM SERPL-SCNC: 4.1 MMOL/L — SIGNIFICANT CHANGE UP (ref 3.5–5.3)
RBC # BLD: 4.78 M/UL — SIGNIFICANT CHANGE UP (ref 3.8–5.2)
RBC # BLD: 4.78 M/UL — SIGNIFICANT CHANGE UP (ref 3.8–5.2)
RBC # FLD: 14.4 % — SIGNIFICANT CHANGE UP (ref 10.3–14.5)
RBC # FLD: 14.4 % — SIGNIFICANT CHANGE UP (ref 10.3–14.5)
SODIUM SERPL-SCNC: 140 MMOL/L — SIGNIFICANT CHANGE UP (ref 135–145)
SODIUM SERPL-SCNC: 140 MMOL/L — SIGNIFICANT CHANGE UP (ref 135–145)
WBC # BLD: 9.19 K/UL — SIGNIFICANT CHANGE UP (ref 3.8–10.5)
WBC # BLD: 9.19 K/UL — SIGNIFICANT CHANGE UP (ref 3.8–10.5)
WBC # FLD AUTO: 9.19 K/UL — SIGNIFICANT CHANGE UP (ref 3.8–10.5)
WBC # FLD AUTO: 9.19 K/UL — SIGNIFICANT CHANGE UP (ref 3.8–10.5)

## 2023-11-26 PROCEDURE — 99232 SBSQ HOSP IP/OBS MODERATE 35: CPT | Mod: GC

## 2023-11-26 RX ORDER — POLYETHYLENE GLYCOL 3350 17 G/17G
17 POWDER, FOR SOLUTION ORAL DAILY
Refills: 0 | Status: DISCONTINUED | OUTPATIENT
Start: 2023-11-26 | End: 2023-11-29

## 2023-11-26 RX ORDER — SENNA PLUS 8.6 MG/1
1 TABLET ORAL AT BEDTIME
Refills: 0 | Status: DISCONTINUED | OUTPATIENT
Start: 2023-11-26 | End: 2023-11-29

## 2023-11-26 RX ORDER — ACETAMINOPHEN 500 MG
1000 TABLET ORAL ONCE
Refills: 0 | Status: COMPLETED | OUTPATIENT
Start: 2023-11-26 | End: 2023-11-26

## 2023-11-26 RX ORDER — MAGNESIUM SULFATE 500 MG/ML
1 VIAL (ML) INJECTION ONCE
Refills: 0 | Status: COMPLETED | OUTPATIENT
Start: 2023-11-26 | End: 2023-11-26

## 2023-11-26 RX ADMIN — LIDOCAINE 1 PATCH: 4 CREAM TOPICAL at 19:30

## 2023-11-26 RX ADMIN — Medication 400 MILLIGRAM(S): at 15:12

## 2023-11-26 RX ADMIN — OXYCODONE HYDROCHLORIDE 5 MILLIGRAM(S): 5 TABLET ORAL at 14:40

## 2023-11-26 RX ADMIN — POLYETHYLENE GLYCOL 3350 17 GRAM(S): 17 POWDER, FOR SOLUTION ORAL at 10:30

## 2023-11-26 RX ADMIN — Medication 1000 UNIT(S): at 10:30

## 2023-11-26 RX ADMIN — Medication 1000 MILLIGRAM(S): at 15:45

## 2023-11-26 RX ADMIN — LIDOCAINE 1 PATCH: 4 CREAM TOPICAL at 21:20

## 2023-11-26 RX ADMIN — ENOXAPARIN SODIUM 40 MILLIGRAM(S): 100 INJECTION SUBCUTANEOUS at 10:31

## 2023-11-26 RX ADMIN — Medication 100 GRAM(S): at 10:31

## 2023-11-26 RX ADMIN — LIDOCAINE 1 PATCH: 4 CREAM TOPICAL at 10:31

## 2023-11-26 RX ADMIN — OXYCODONE HYDROCHLORIDE 5 MILLIGRAM(S): 5 TABLET ORAL at 14:00

## 2023-11-26 NOTE — PROGRESS NOTE ADULT - PROBLEM SELECTOR PLAN 1
Per patient story, patient slipped on the ground and fell. Denies dizziness, chest palpitations, or LOC at the time.  Orthostatics OK.  EKG obtained in ED NSR.  TTE EF 59%, overall reassuring  - D/c tele (mechanical fall)  - PT Consult in.

## 2023-11-26 NOTE — PROGRESS NOTE ADULT - SUBJECTIVE AND OBJECTIVE BOX
DATE OF SERVICE: 11-26-23 @ 05:55  --------------------------------------------------------------  Omar Greer MD  PGY-2, Internal Medicine  [employer] Teams preferred  Pager #: [phone], [facility] [phone]  After 7 PM: Night Float Pager  --------------------------------------------------------------      Patient is a 78y old  Female who presents with a chief complaint of Dizziness (25 Nov 2023 06:24)      SUBJECTIVE / OVERNIGHT EVENTS:  No acute events overnight, patient reports feeling well and all questions answered at this time.     Additional Review of Systems:  Denies any other acute sx including f/c, HA, cough, sore throat, eye/ear changes, rhinorrhea, CP, palpitations, SOB, n/v, abdominal pain, back pain, bowel/bladder changes, fatigue, numbness or tingling.    MEDICATIONS  (STANDING):  cholecalciferol 1000 Unit(s) Oral daily  enoxaparin Injectable 40 milliGRAM(s) SubCutaneous every 24 hours  influenza  Vaccine (HIGH DOSE) 0.7 milliLiter(s) IntraMuscular once  lidocaine   4% Patch 1 Patch Transdermal daily    MEDICATIONS  (PRN):  acetaminophen     Tablet .. 650 milliGRAM(s) Oral every 6 hours PRN Mild Pain (1 - 3)  meclizine 25 milliGRAM(s) Oral every 8 hours PRN Dizziness  melatonin 3 milliGRAM(s) Oral at bedtime PRN Insomnia  oxyCODONE    IR 2.5 milliGRAM(s) Oral every 6 hours PRN Moderate Pain (4 - 6)  oxyCODONE    IR 5 milliGRAM(s) Oral every 6 hours PRN Severe Pain (7 - 10)      Vital Signs Last 24 Hrs  T(C): 36.7 (26 Nov 2023 04:35), Max: 36.8 (25 Nov 2023 15:14)  T(F): 98 (26 Nov 2023 04:35), Max: 98.3 (25 Nov 2023 15:14)  HR: 72 (26 Nov 2023 04:35) (72 - 80)  BP: 147/62 (26 Nov 2023 04:35) (137/56 - 150/64)  BP(mean): --  RR: 18 (26 Nov 2023 04:35) (18 - 18)  SpO2: 99% (26 Nov 2023 04:35) (95% - 99%)    Parameters below as of 26 Nov 2023 04:35  Patient On (Oxygen Delivery Method): room air      CAPILLARY BLOOD GLUCOSE        I&O's Summary      PHYSICAL EXAM:  GENERAL: Clinically well-appearing and comfortable  HEAD:  Atraumatic, Normocephalic  EYES: EOMI, PERRL, conjunctiva and sclera clear  NECK: Supple, No JVD  CHEST/LUNG: Clear to auscultation bilaterally; No wheeze  HEART: Regular rate and rhythm; No murmurs, rubs, or gallops  ABDOMEN: Soft, Nontender, Nondistended; Bowel sounds present  EXTREMITIES:  2+ Peripheral Pulses, No clubbing, cyanosis, or edema  PSYCH: Normal mood, Normal affect  NEUROLOGY: non-focal, moving all four extremities  SKIN: No rashes or lesions    LABS:                        12.6   9.75  )-----------( 344      ( 25 Nov 2023 06:35 )             38.7     11-25    142  |  107  |  18  ----------------------------<  89  4.0   |  24  |  0.58    Ca    8.8      25 Nov 2023 06:35  Phos  2.7     11-25  Mg     2.40     11-25            Urinalysis Basic - ( 25 Nov 2023 06:35 )    Color: x / Appearance: x / SG: x / pH: x  Gluc: 89 mg/dL / Ketone: x  / Bili: x / Urobili: x   Blood: x / Protein: x / Nitrite: x   Leuk Esterase: x / RBC: x / WBC x   Sq Epi: x / Non Sq Epi: x / Bacteria: x        RADIOLOGY & ADDITIONAL TESTS:    Imaging Personally Reviewed:    Consultant(s) Notes Reviewed:      Care Discussed with Consultants/Other Providers:   DATE OF SERVICE: 11-26-23 @ 05:55  --------------------------------------------------------------  Omar Greer MD  PGY-2, Internal Medicine  [employer] Teams preferred  Pager #: [phone], [facility] [phone]  After 7 PM: Night Float Pager  --------------------------------------------------------------      Patient is a 78y old  Female who presents with a chief complaint of Dizziness (25 Nov 2023 06:24)      SUBJECTIVE / OVERNIGHT EVENTS:  No acute events overnight, patient reports feeling well and all questions answered at this time.     Additional Review of Systems:  Denies any other acute sx including f/c, HA, cough, sore throat, eye/ear changes, rhinorrhea, CP, palpitations, SOB, n/v, abdominal pain, back pain, bowel/bladder changes, fatigue, numbness or tingling.    MEDICATIONS  (STANDING):  cholecalciferol 1000 Unit(s) Oral daily  enoxaparin Injectable 40 milliGRAM(s) SubCutaneous every 24 hours  influenza  Vaccine (HIGH DOSE) 0.7 milliLiter(s) IntraMuscular once  lidocaine   4% Patch 1 Patch Transdermal daily    MEDICATIONS  (PRN):  acetaminophen     Tablet .. 650 milliGRAM(s) Oral every 6 hours PRN Mild Pain (1 - 3)  meclizine 25 milliGRAM(s) Oral every 8 hours PRN Dizziness  melatonin 3 milliGRAM(s) Oral at bedtime PRN Insomnia  oxyCODONE    IR 2.5 milliGRAM(s) Oral every 6 hours PRN Moderate Pain (4 - 6)  oxyCODONE    IR 5 milliGRAM(s) Oral every 6 hours PRN Severe Pain (7 - 10)      Vital Signs Last 24 Hrs  T(C): 36.7 (26 Nov 2023 04:35), Max: 36.8 (25 Nov 2023 15:14)  T(F): 98 (26 Nov 2023 04:35), Max: 98.3 (25 Nov 2023 15:14)  HR: 72 (26 Nov 2023 04:35) (72 - 80)  BP: 147/62 (26 Nov 2023 04:35) (137/56 - 150/64)  BP(mean): --  RR: 18 (26 Nov 2023 04:35) (18 - 18)  SpO2: 99% (26 Nov 2023 04:35) (95% - 99%)    Parameters below as of 26 Nov 2023 04:35  Patient On (Oxygen Delivery Method): room air      CAPILLARY BLOOD GLUCOSE        I&O's Summary      PHYSICAL EXAM:  GENERAL: Clinically well-appearing and comfortable  HEAD:  Atraumatic, Normocephalic  EYES: EOMI, PERRL, conjunctiva and sclera clear  NECK: Supple, No JVD  CHEST/LUNG: Clear to auscultation bilaterally; No wheeze  HEART: Regular rate and rhythm; No murmurs, rubs, or gallops  ABDOMEN: Soft, Nontender, Nondistended; Bowel sounds present  EXTREMITIES:  2+ Peripheral Pulses, No clubbing, cyanosis, or edema  PSYCH: Normal mood, Normal affect  NEUROLOGY: non-focal, moving all four extremities  SKIN: No rashes or lesions    LABS:                        12.6   9.75  )-----------( 344      ( 25 Nov 2023 06:35 )             38.7     11-25    142  |  107  |  18  ----------------------------<  89  4.0   |  24  |  0.58    Ca    8.8      25 Nov 2023 06:35  Phos  2.7     11-25  Mg     2.40     11-25            Urinalysis Basic - ( 25 Nov 2023 06:35 )    Color: x / Appearance: x / SG: x / pH: x  Gluc: 89 mg/dL / Ketone: x  / Bili: x / Urobili: x   Blood: x / Protein: x / Nitrite: x   Leuk Esterase: x / RBC: x / WBC x   Sq Epi: x / Non Sq Epi: x / Bacteria: x        RADIOLOGY & ADDITIONAL TESTS:    Imaging Personally Reviewed:    Consultant(s) Notes Reviewed:      Care Discussed with Consultants/Other Providers:   DATE OF SERVICE: 11-26-23 @ 05:55  --------------------------------------------------------------  Omar Greer MD  PGY-2, Internal Medicine  [employer] Teams preferred  Pager #: [phone], [facility] [phone]  After 7 PM: Night Float Pager  --------------------------------------------------------------      Patient is a 78y old  Female who presents with a chief complaint of Dizziness (25 Nov 2023 06:24)      SUBJECTIVE / OVERNIGHT EVENTS:  No acute events overnight, patient reports feeling well and all questions answered at this time.     Additional Review of Systems:  Denies any other acute sx including f/c, HA, cough, sore throat, eye/ear changes, rhinorrhea, CP, palpitations, SOB, n/v, abdominal pain, back pain, bowel/bladder changes, fatigue, numbness or tingling.    MEDICATIONS  (STANDING):  cholecalciferol 1000 Unit(s) Oral daily  enoxaparin Injectable 40 milliGRAM(s) SubCutaneous every 24 hours  influenza  Vaccine (HIGH DOSE) 0.7 milliLiter(s) IntraMuscular once  lidocaine   4% Patch 1 Patch Transdermal daily    MEDICATIONS  (PRN):  acetaminophen     Tablet .. 650 milliGRAM(s) Oral every 6 hours PRN Mild Pain (1 - 3)  meclizine 25 milliGRAM(s) Oral every 8 hours PRN Dizziness  melatonin 3 milliGRAM(s) Oral at bedtime PRN Insomnia  oxyCODONE    IR 2.5 milliGRAM(s) Oral every 6 hours PRN Moderate Pain (4 - 6)  oxyCODONE    IR 5 milliGRAM(s) Oral every 6 hours PRN Severe Pain (7 - 10)      Vital Signs Last 24 Hrs  T(C): 36.7 (26 Nov 2023 04:35), Max: 36.8 (25 Nov 2023 15:14)  T(F): 98 (26 Nov 2023 04:35), Max: 98.3 (25 Nov 2023 15:14)  HR: 72 (26 Nov 2023 04:35) (72 - 80)  BP: 147/62 (26 Nov 2023 04:35) (137/56 - 150/64)  BP(mean): --  RR: 18 (26 Nov 2023 04:35) (18 - 18)  SpO2: 99% (26 Nov 2023 04:35) (95% - 99%)    Parameters below as of 26 Nov 2023 04:35  Patient On (Oxygen Delivery Method): room air      CAPILLARY BLOOD GLUCOSE        I&O's Summary      PHYSICAL EXAM:  GENERAL: Clinically well-appearing and comfortable  HEAD:  Atraumatic, Normocephalic  EYES: EOMI, PERRL, conjunctiva and sclera clear  NECK: Supple, No JVD  CHEST/LUNG: Clear to auscultation bilaterally; No wheeze  HEART: Regular rate and rhythm; No murmurs, rubs, or gallops  ABDOMEN: Soft, Nontender, Nondistended; Bowel sounds present  EXTREMITIES:  2+ Peripheral Pulses, No clubbing, cyanosis, or edema  PSYCH: Normal mood, Normal affect  NEUROLOGY: non-focal, moving all four extremities  SKIN: No rashes or lesions    LABS:                        12.6   9.75  )-----------( 344      ( 25 Nov 2023 06:35 )             38.7     11-25    142  |  107  |  18  ----------------------------<  89  4.0   |  24  |  0.58    Ca    8.8      25 Nov 2023 06:35  Phos  2.7     11-25  Mg     2.40     11-25            Urinalysis Basic - ( 25 Nov 2023 06:35 )    Color: x / Appearance: x / SG: x / pH: x  Gluc: 89 mg/dL / Ketone: x  / Bili: x / Urobili: x   Blood: x / Protein: x / Nitrite: x   Leuk Esterase: x / RBC: x / WBC x   Sq Epi: x / Non Sq Epi: x / Bacteria: x        RADIOLOGY & ADDITIONAL TESTS:    Imaging Personally Reviewed:    Consultant(s) Notes Reviewed:      Care Discussed with Consultants/Other Providers:

## 2023-11-26 NOTE — PROGRESS NOTE ADULT - PROBLEM SELECTOR PLAN 2
Left 4-7th rib fractures with associated pain and shallower breaths. CT Chest pertinent for bilateral lower lobe, right middle lobe, and lingular linear atelectasis.    - Thoracic Surgery consulted, no acute interventions at this time  - Incentive spirometry explained and encouraged  - Pain control: Tylenol q6 PRN for 1-3 pain, Oxycodone IR 2.5 PRN for 4-7 pain, Oxycodone IR 5 for 8-10  - F/u with Dr Danny Person in 3-4 weeks with repeat CXR.  - Lidocaine patch

## 2023-11-26 NOTE — PROGRESS NOTE ADULT - ATTENDING COMMENTS
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    Meningioma:  - concern for interval increase in size  - no focal neuro deficit at this time.  - Awaiting MR Brain  - appreciate NSx and Neurology consult    Left rib fractures:  - pain control with lidocaine patch, oxyIR PRN, incentive spirometry    Syncope:  - TTE reviewed - nml LVFx  - no dysrhythmia   - orthostatic negative  - PT eval for safe disposition .

## 2023-11-27 LAB
ANION GAP SERPL CALC-SCNC: 11 MMOL/L — SIGNIFICANT CHANGE UP (ref 7–14)
ANION GAP SERPL CALC-SCNC: 11 MMOL/L — SIGNIFICANT CHANGE UP (ref 7–14)
BUN SERPL-MCNC: 12 MG/DL — SIGNIFICANT CHANGE UP (ref 7–23)
BUN SERPL-MCNC: 12 MG/DL — SIGNIFICANT CHANGE UP (ref 7–23)
CALCIUM SERPL-MCNC: 8.8 MG/DL — SIGNIFICANT CHANGE UP (ref 8.4–10.5)
CALCIUM SERPL-MCNC: 8.8 MG/DL — SIGNIFICANT CHANGE UP (ref 8.4–10.5)
CHLORIDE SERPL-SCNC: 105 MMOL/L — SIGNIFICANT CHANGE UP (ref 98–107)
CHLORIDE SERPL-SCNC: 105 MMOL/L — SIGNIFICANT CHANGE UP (ref 98–107)
CO2 SERPL-SCNC: 24 MMOL/L — SIGNIFICANT CHANGE UP (ref 22–31)
CO2 SERPL-SCNC: 24 MMOL/L — SIGNIFICANT CHANGE UP (ref 22–31)
CREAT SERPL-MCNC: 0.56 MG/DL — SIGNIFICANT CHANGE UP (ref 0.5–1.3)
CREAT SERPL-MCNC: 0.56 MG/DL — SIGNIFICANT CHANGE UP (ref 0.5–1.3)
EGFR: 95 ML/MIN/1.73M2 — SIGNIFICANT CHANGE UP
EGFR: 95 ML/MIN/1.73M2 — SIGNIFICANT CHANGE UP
GLUCOSE SERPL-MCNC: 94 MG/DL — SIGNIFICANT CHANGE UP (ref 70–99)
GLUCOSE SERPL-MCNC: 94 MG/DL — SIGNIFICANT CHANGE UP (ref 70–99)
HCT VFR BLD CALC: 38 % — SIGNIFICANT CHANGE UP (ref 34.5–45)
HCT VFR BLD CALC: 38 % — SIGNIFICANT CHANGE UP (ref 34.5–45)
HGB BLD-MCNC: 12.5 G/DL — SIGNIFICANT CHANGE UP (ref 11.5–15.5)
HGB BLD-MCNC: 12.5 G/DL — SIGNIFICANT CHANGE UP (ref 11.5–15.5)
MAGNESIUM SERPL-MCNC: 2.5 MG/DL — SIGNIFICANT CHANGE UP (ref 1.6–2.6)
MAGNESIUM SERPL-MCNC: 2.5 MG/DL — SIGNIFICANT CHANGE UP (ref 1.6–2.6)
MCHC RBC-ENTMCNC: 26.2 PG — LOW (ref 27–34)
MCHC RBC-ENTMCNC: 26.2 PG — LOW (ref 27–34)
MCHC RBC-ENTMCNC: 32.9 GM/DL — SIGNIFICANT CHANGE UP (ref 32–36)
MCHC RBC-ENTMCNC: 32.9 GM/DL — SIGNIFICANT CHANGE UP (ref 32–36)
MCV RBC AUTO: 79.5 FL — LOW (ref 80–100)
MCV RBC AUTO: 79.5 FL — LOW (ref 80–100)
NRBC # BLD: 0 /100 WBCS — SIGNIFICANT CHANGE UP (ref 0–0)
NRBC # BLD: 0 /100 WBCS — SIGNIFICANT CHANGE UP (ref 0–0)
NRBC # FLD: 0 K/UL — SIGNIFICANT CHANGE UP (ref 0–0)
NRBC # FLD: 0 K/UL — SIGNIFICANT CHANGE UP (ref 0–0)
PHOSPHATE SERPL-MCNC: 2.6 MG/DL — SIGNIFICANT CHANGE UP (ref 2.5–4.5)
PHOSPHATE SERPL-MCNC: 2.6 MG/DL — SIGNIFICANT CHANGE UP (ref 2.5–4.5)
PLATELET # BLD AUTO: 347 K/UL — SIGNIFICANT CHANGE UP (ref 150–400)
PLATELET # BLD AUTO: 347 K/UL — SIGNIFICANT CHANGE UP (ref 150–400)
POTASSIUM SERPL-MCNC: 4.2 MMOL/L — SIGNIFICANT CHANGE UP (ref 3.5–5.3)
POTASSIUM SERPL-MCNC: 4.2 MMOL/L — SIGNIFICANT CHANGE UP (ref 3.5–5.3)
POTASSIUM SERPL-SCNC: 4.2 MMOL/L — SIGNIFICANT CHANGE UP (ref 3.5–5.3)
POTASSIUM SERPL-SCNC: 4.2 MMOL/L — SIGNIFICANT CHANGE UP (ref 3.5–5.3)
RBC # BLD: 4.78 M/UL — SIGNIFICANT CHANGE UP (ref 3.8–5.2)
RBC # BLD: 4.78 M/UL — SIGNIFICANT CHANGE UP (ref 3.8–5.2)
RBC # FLD: 14.1 % — SIGNIFICANT CHANGE UP (ref 10.3–14.5)
RBC # FLD: 14.1 % — SIGNIFICANT CHANGE UP (ref 10.3–14.5)
SODIUM SERPL-SCNC: 140 MMOL/L — SIGNIFICANT CHANGE UP (ref 135–145)
SODIUM SERPL-SCNC: 140 MMOL/L — SIGNIFICANT CHANGE UP (ref 135–145)
WBC # BLD: 8.48 K/UL — SIGNIFICANT CHANGE UP (ref 3.8–10.5)
WBC # BLD: 8.48 K/UL — SIGNIFICANT CHANGE UP (ref 3.8–10.5)
WBC # FLD AUTO: 8.48 K/UL — SIGNIFICANT CHANGE UP (ref 3.8–10.5)
WBC # FLD AUTO: 8.48 K/UL — SIGNIFICANT CHANGE UP (ref 3.8–10.5)

## 2023-11-27 PROCEDURE — 70544 MR ANGIOGRAPHY HEAD W/O DYE: CPT | Mod: 26,XU

## 2023-11-27 PROCEDURE — 70553 MRI BRAIN STEM W/O & W/DYE: CPT | Mod: 26

## 2023-11-27 PROCEDURE — 99239 HOSP IP/OBS DSCHRG MGMT >30: CPT

## 2023-11-27 RX ADMIN — ENOXAPARIN SODIUM 40 MILLIGRAM(S): 100 INJECTION SUBCUTANEOUS at 11:43

## 2023-11-27 RX ADMIN — POLYETHYLENE GLYCOL 3350 17 GRAM(S): 17 POWDER, FOR SOLUTION ORAL at 11:43

## 2023-11-27 RX ADMIN — LIDOCAINE 1 PATCH: 4 CREAM TOPICAL at 11:44

## 2023-11-27 RX ADMIN — Medication 1000 UNIT(S): at 11:47

## 2023-11-27 NOTE — PROGRESS NOTE ADULT - SUBJECTIVE AND OBJECTIVE BOX
INCOMPLETE NOTE    Dain Ayaka | PGY2| Pager: [place] [phone]) -- [place] ([phone])  Interval Events:    REVIEW OF SYSTEMS:  CONSTITUTIONAL: No weakness, fevers or chills  EYES/ENT: No visual changes;  No vertigo or throat pain   NECK: No pain or stiffness  RESPIRATORY: No cough, wheezing, hemoptysis; No shortness of breath  CARDIOVASCULAR: No chest pain or palpitations  GASTROINTESTINAL: No abdominal or epigastric pain. No nausea, vomiting, or hematemesis; No diarrhea or constipation. No melena or hematochezia.  GENITOURINARY: No dysuria, frequency or hematuria  NEUROLOGICAL: No numbness or weakness  SKIN: No itching, burning, rashes, or lesions   All other review of systems is negative unless indicated above.    OBJECTIVE:  ICU Vital Signs Last 24 Hrs  T(C): 36.7 (27 Nov 2023 04:00), Max: 36.7 (26 Nov 2023 14:06)  T(F): 98 (27 Nov 2023 04:00), Max: 98.1 (26 Nov 2023 14:06)  HR: 77 (27 Nov 2023 04:00) (72 - 78)  BP: 135/56 (27 Nov 2023 04:00) (120/67 - 139/50)  BP(mean): 76 (27 Nov 2023 04:00) (76 - 76)  ABP: --  ABP(mean): --  RR: 16 (27 Nov 2023 04:00) (16 - 19)  SpO2: 94% (27 Nov 2023 04:00) (94% - 98%)    O2 Parameters below as of 27 Nov 2023 04:00  Patient On (Oxygen Delivery Method): room air              CAPILLARY BLOOD GLUCOSE          PHYSICAL EXAM:  General: WN/WD NAD  Neurology: A&Ox3, nonfocal, MORAN x 4  Eyes: PERRLA/ EOMI, Gross vision intact  ENT/Neck: Neck supple, trachea midline, No JVD, Gross hearing intact  Respiratory: CTA B/L, No wheezing, rales, rhonchi  CV: RRR, +S1/S2, -S3/S4, no murmurs, rubs or gallops  Abdominal: Soft, NT, ND +BS, No HSM  MSK: 5/5 strength UE/LE bilaterally  Extremities: No edema, 2+ peripheral pulses  Skin: No Rashes, Hematoma, Ecchymosis  Incisions:   Tubes:    HOSPITAL MEDICATIONS:  MEDICATIONS  (STANDING):  cholecalciferol 1000 Unit(s) Oral daily  enoxaparin Injectable 40 milliGRAM(s) SubCutaneous every 24 hours  influenza  Vaccine (HIGH DOSE) 0.7 milliLiter(s) IntraMuscular once  lidocaine   4% Patch 1 Patch Transdermal daily  polyethylene glycol 3350 17 Gram(s) Oral daily  senna 1 Tablet(s) Oral at bedtime    MEDICATIONS  (PRN):  acetaminophen     Tablet .. 650 milliGRAM(s) Oral every 6 hours PRN Mild Pain (1 - 3)  meclizine 25 milliGRAM(s) Oral every 8 hours PRN Dizziness  melatonin 3 milliGRAM(s) Oral at bedtime PRN Insomnia  oxyCODONE    IR 2.5 milliGRAM(s) Oral every 6 hours PRN Moderate Pain (4 - 6)  oxyCODONE    IR 5 milliGRAM(s) Oral every 6 hours PRN Severe Pain (7 - 10)      LABS:                        12.5   9.19  )-----------( 328      ( 26 Nov 2023 05:00 )             38.9     Hgb Trend: 12.5<--, 12.6<--, 11.8<--, 12.5<--  11-26    140  |  107  |  16  ----------------------------<  89  4.1   |  24  |  0.56    Ca    8.6      26 Nov 2023 05:00  Phos  2.5     11-26  Mg     2.30     11-26      Creatinine Trend: 0.56<--, 0.58<--, 0.59<--, 0.52<--    Urinalysis Basic - ( 26 Nov 2023 05:00 )    Color: x / Appearance: x / SG: x / pH: x  Gluc: 89 mg/dL / Ketone: x  / Bili: x / Urobili: x   Blood: x / Protein: x / Nitrite: x   Leuk Esterase: x / RBC: x / WBC x   Sq Epi: x / Non Sq Epi: x / Bacteria: x            MICROBIOLOGY:          INCOMPLETE NOTE    Dain Ayaka | PGY2| Pager: [place] ([phone]) -- [place] ([phone])  Interval Events:    REVIEW OF SYSTEMS:  CONSTITUTIONAL: No weakness, fevers or chills  EYES/ENT: No visual changes;  No vertigo or throat pain   NECK: No pain or stiffness  RESPIRATORY: No cough, wheezing, hemoptysis; No shortness of breath  CARDIOVASCULAR: No chest pain or palpitations  GASTROINTESTINAL: No abdominal or epigastric pain. No nausea, vomiting, or hematemesis; No diarrhea or constipation. No melena or hematochezia.  GENITOURINARY: No dysuria, frequency or hematuria  NEUROLOGICAL: No numbness or weakness  SKIN: No itching, burning, rashes, or lesions   All other review of systems is negative unless indicated above.    OBJECTIVE:  ICU Vital Signs Last 24 Hrs  T(C): 36.7 (27 Nov 2023 04:00), Max: 36.7 (26 Nov 2023 14:06)  T(F): 98 (27 Nov 2023 04:00), Max: 98.1 (26 Nov 2023 14:06)  HR: 77 (27 Nov 2023 04:00) (72 - 78)  BP: 135/56 (27 Nov 2023 04:00) (120/67 - 139/50)  BP(mean): 76 (27 Nov 2023 04:00) (76 - 76)  ABP: --  ABP(mean): --  RR: 16 (27 Nov 2023 04:00) (16 - 19)  SpO2: 94% (27 Nov 2023 04:00) (94% - 98%)    O2 Parameters below as of 27 Nov 2023 04:00  Patient On (Oxygen Delivery Method): room air              CAPILLARY BLOOD GLUCOSE          PHYSICAL EXAM:  General: WN/WD NAD  Neurology: A&Ox3, nonfocal, MORAN x 4  Eyes: PERRLA/ EOMI, Gross vision intact  ENT/Neck: Neck supple, trachea midline, No JVD, Gross hearing intact  Respiratory: CTA B/L, No wheezing, rales, rhonchi  CV: RRR, +S1/S2, -S3/S4, no murmurs, rubs or gallops  Abdominal: Soft, NT, ND +BS, No HSM  MSK: 5/5 strength UE/LE bilaterally  Extremities: No edema, 2+ peripheral pulses  Skin: No Rashes, Hematoma, Ecchymosis  Incisions:   Tubes:    HOSPITAL MEDICATIONS:  MEDICATIONS  (STANDING):  cholecalciferol 1000 Unit(s) Oral daily  enoxaparin Injectable 40 milliGRAM(s) SubCutaneous every 24 hours  influenza  Vaccine (HIGH DOSE) 0.7 milliLiter(s) IntraMuscular once  lidocaine   4% Patch 1 Patch Transdermal daily  polyethylene glycol 3350 17 Gram(s) Oral daily  senna 1 Tablet(s) Oral at bedtime    MEDICATIONS  (PRN):  acetaminophen     Tablet .. 650 milliGRAM(s) Oral every 6 hours PRN Mild Pain (1 - 3)  meclizine 25 milliGRAM(s) Oral every 8 hours PRN Dizziness  melatonin 3 milliGRAM(s) Oral at bedtime PRN Insomnia  oxyCODONE    IR 2.5 milliGRAM(s) Oral every 6 hours PRN Moderate Pain (4 - 6)  oxyCODONE    IR 5 milliGRAM(s) Oral every 6 hours PRN Severe Pain (7 - 10)      LABS:                        12.5   9.19  )-----------( 328      ( 26 Nov 2023 05:00 )             38.9     Hgb Trend: 12.5<--, 12.6<--, 11.8<--, 12.5<--  11-26    140  |  107  |  16  ----------------------------<  89  4.1   |  24  |  0.56    Ca    8.6      26 Nov 2023 05:00  Phos  2.5     11-26  Mg     2.30     11-26      Creatinine Trend: 0.56<--, 0.58<--, 0.59<--, 0.52<--    Urinalysis Basic - ( 26 Nov 2023 05:00 )    Color: x / Appearance: x / SG: x / pH: x  Gluc: 89 mg/dL / Ketone: x  / Bili: x / Urobili: x   Blood: x / Protein: x / Nitrite: x   Leuk Esterase: x / RBC: x / WBC x   Sq Epi: x / Non Sq Epi: x / Bacteria: x            MICROBIOLOGY:          INCOMPLETE NOTE    Dain Ayaka | PGY2| Pager: [place] ([phone]) -- [place] ([phone])  Interval Events:    REVIEW OF SYSTEMS:  CONSTITUTIONAL: No weakness, fevers or chills  EYES/ENT: No visual changes;  No vertigo or throat pain   NECK: No pain or stiffness  RESPIRATORY: No cough, wheezing, hemoptysis; No shortness of breath  CARDIOVASCULAR: No chest pain or palpitations  GASTROINTESTINAL: No abdominal or epigastric pain. No nausea, vomiting, or hematemesis; No diarrhea or constipation. No melena or hematochezia.  GENITOURINARY: No dysuria, frequency or hematuria  NEUROLOGICAL: No numbness or weakness  SKIN: No itching, burning, rashes, or lesions   All other review of systems is negative unless indicated above.    OBJECTIVE:  ICU Vital Signs Last 24 Hrs  T(C): 36.7 (27 Nov 2023 04:00), Max: 36.7 (26 Nov 2023 14:06)  T(F): 98 (27 Nov 2023 04:00), Max: 98.1 (26 Nov 2023 14:06)  HR: 77 (27 Nov 2023 04:00) (72 - 78)  BP: 135/56 (27 Nov 2023 04:00) (120/67 - 139/50)  BP(mean): 76 (27 Nov 2023 04:00) (76 - 76)  ABP: --  ABP(mean): --  RR: 16 (27 Nov 2023 04:00) (16 - 19)  SpO2: 94% (27 Nov 2023 04:00) (94% - 98%)    O2 Parameters below as of 27 Nov 2023 04:00  Patient On (Oxygen Delivery Method): room air              CAPILLARY BLOOD GLUCOSE          PHYSICAL EXAM:  General: WN/WD NAD  Neurology: A&Ox3, nonfocal, MORAN x 4  Eyes: PERRLA/ EOMI, Gross vision intact  ENT/Neck: Neck supple, trachea midline, No JVD, Gross hearing intact  Respiratory: CTA B/L, No wheezing, rales, rhonchi  CV: RRR, +S1/S2, -S3/S4, no murmurs, rubs or gallops  Abdominal: Soft, NT, ND +BS, No HSM  MSK: 5/5 strength UE/LE bilaterally  Extremities: No edema, 2+ peripheral pulses  Skin: No Rashes, Hematoma, Ecchymosis  Incisions:   Tubes:    HOSPITAL MEDICATIONS:  MEDICATIONS  (STANDING):  cholecalciferol 1000 Unit(s) Oral daily  enoxaparin Injectable 40 milliGRAM(s) SubCutaneous every 24 hours  influenza  Vaccine (HIGH DOSE) 0.7 milliLiter(s) IntraMuscular once  lidocaine   4% Patch 1 Patch Transdermal daily  polyethylene glycol 3350 17 Gram(s) Oral daily  senna 1 Tablet(s) Oral at bedtime    MEDICATIONS  (PRN):  acetaminophen     Tablet .. 650 milliGRAM(s) Oral every 6 hours PRN Mild Pain (1 - 3)  meclizine 25 milliGRAM(s) Oral every 8 hours PRN Dizziness  melatonin 3 milliGRAM(s) Oral at bedtime PRN Insomnia  oxyCODONE    IR 2.5 milliGRAM(s) Oral every 6 hours PRN Moderate Pain (4 - 6)  oxyCODONE    IR 5 milliGRAM(s) Oral every 6 hours PRN Severe Pain (7 - 10)      LABS:                        12.5   9.19  )-----------( 328      ( 26 Nov 2023 05:00 )             38.9     Hgb Trend: 12.5<--, 12.6<--, 11.8<--, 12.5<--  11-26    140  |  107  |  16  ----------------------------<  89  4.1   |  24  |  0.56    Ca    8.6      26 Nov 2023 05:00  Phos  2.5     11-26  Mg     2.30     11-26      Creatinine Trend: 0.56<--, 0.58<--, 0.59<--, 0.52<--    Urinalysis Basic - ( 26 Nov 2023 05:00 )    Color: x / Appearance: x / SG: x / pH: x  Gluc: 89 mg/dL / Ketone: x  / Bili: x / Urobili: x   Blood: x / Protein: x / Nitrite: x   Leuk Esterase: x / RBC: x / WBC x   Sq Epi: x / Non Sq Epi: x / Bacteria: x            MICROBIOLOGY:          Dain Marley | PGY2| Pager: [place] [phone]) -- [place] ([phone])  Interval Events: NAEON    REVIEW OF SYSTEMS:  CONSTITUTIONAL: No weakness, fevers or chills  EYES/ENT: No visual changes;  No vertigo or throat pain   NECK: No pain or stiffness  RESPIRATORY: No cough, wheezing, hemoptysis; No shortness of breath  CARDIOVASCULAR: No chest pain or palpitations  GASTROINTESTINAL: No abdominal or epigastric pain. No nausea, vomiting, or hematemesis; No diarrhea or constipation. No melena or hematochezia.  GENITOURINARY: No dysuria, frequency or hematuria  NEUROLOGICAL: No numbness or weakness  SKIN: No itching, burning, rashes, or lesions   All other review of systems is negative unless indicated above.    OBJECTIVE:  ICU Vital Signs Last 24 Hrs  T(C): 36.7 (27 Nov 2023 04:00), Max: 36.7 (26 Nov 2023 14:06)  T(F): 98 (27 Nov 2023 04:00), Max: 98.1 (26 Nov 2023 14:06)  HR: 77 (27 Nov 2023 04:00) (72 - 78)  BP: 135/56 (27 Nov 2023 04:00) (120/67 - 139/50)  BP(mean): 76 (27 Nov 2023 04:00) (76 - 76)  ABP: --  ABP(mean): --  RR: 16 (27 Nov 2023 04:00) (16 - 19)  SpO2: 94% (27 Nov 2023 04:00) (94% - 98%)    O2 Parameters below as of 27 Nov 2023 04:00  Patient On (Oxygen Delivery Method): room air              CAPILLARY BLOOD GLUCOSE          PHYSICAL EXAM:  General: WN/WD NAD  Neurology: A&Ox3, nonfocal, MORAN x 4  Eyes: PERRLA/ EOMI, Gross vision intact  ENT/Neck: Neck supple, trachea midline, No JVD, Gross hearing intact  Respiratory: CTA B/L, No wheezing, rales, rhonchi  CV: RRR, +S1/S2, -S3/S4, no murmurs, rubs or gallops  Abdominal: Soft, NT, ND +BS, No HSM  MSK: 5/5 strength UE/LE bilaterally  Extremities: No edema, 2+ peripheral pulses  Skin: No Rashes, Hematoma, Ecchymosis  Incisions:   Tubes:    HOSPITAL MEDICATIONS:  MEDICATIONS  (STANDING):  cholecalciferol 1000 Unit(s) Oral daily  enoxaparin Injectable 40 milliGRAM(s) SubCutaneous every 24 hours  influenza  Vaccine (HIGH DOSE) 0.7 milliLiter(s) IntraMuscular once  lidocaine   4% Patch 1 Patch Transdermal daily  polyethylene glycol 3350 17 Gram(s) Oral daily  senna 1 Tablet(s) Oral at bedtime    MEDICATIONS  (PRN):  acetaminophen     Tablet .. 650 milliGRAM(s) Oral every 6 hours PRN Mild Pain (1 - 3)  meclizine 25 milliGRAM(s) Oral every 8 hours PRN Dizziness  melatonin 3 milliGRAM(s) Oral at bedtime PRN Insomnia  oxyCODONE    IR 2.5 milliGRAM(s) Oral every 6 hours PRN Moderate Pain (4 - 6)  oxyCODONE    IR 5 milliGRAM(s) Oral every 6 hours PRN Severe Pain (7 - 10)      LABS:                        12.5   9.19  )-----------( 328      ( 26 Nov 2023 05:00 )             38.9     Hgb Trend: 12.5<--, 12.6<--, 11.8<--, 12.5<--  11-26    140  |  107  |  16  ----------------------------<  89  4.1   |  24  |  0.56    Ca    8.6      26 Nov 2023 05:00  Phos  2.5     11-26  Mg     2.30     11-26      Creatinine Trend: 0.56<--, 0.58<--, 0.59<--, 0.52<--    Urinalysis Basic - ( 26 Nov 2023 05:00 )    Color: x / Appearance: x / SG: x / pH: x  Gluc: 89 mg/dL / Ketone: x  / Bili: x / Urobili: x   Blood: x / Protein: x / Nitrite: x   Leuk Esterase: x / RBC: x / WBC x   Sq Epi: x / Non Sq Epi: x / Bacteria: x            MICROBIOLOGY:          Dain Marley | PGY2| Pager: [place] [phone]) -- [place] ([phone])  Interval Events: NAEON    REVIEW OF SYSTEMS:  CONSTITUTIONAL: No weakness, fevers or chills  EYES/ENT: No visual changes;  No vertigo or throat pain   NECK: No pain or stiffness  RESPIRATORY: No cough, wheezing, hemoptysis; No shortness of breath  CARDIOVASCULAR: No chest pain or palpitations  GASTROINTESTINAL: No abdominal or epigastric pain. No nausea, vomiting, or hematemesis; No diarrhea or constipation. No melena or hematochezia.  GENITOURINARY: No dysuria, frequency or hematuria  NEUROLOGICAL: No numbness or weakness  SKIN: No itching, burning, rashes, or lesions   All other review of systems is negative unless indicated above.    OBJECTIVE:  ICU Vital Signs Last 24 Hrs  T(C): 36.7 (27 Nov 2023 04:00), Max: 36.7 (26 Nov 2023 14:06)  T(F): 98 (27 Nov 2023 04:00), Max: 98.1 (26 Nov 2023 14:06)  HR: 77 (27 Nov 2023 04:00) (72 - 78)  BP: 135/56 (27 Nov 2023 04:00) (120/67 - 139/50)  BP(mean): 76 (27 Nov 2023 04:00) (76 - 76)  ABP: --  ABP(mean): --  RR: 16 (27 Nov 2023 04:00) (16 - 19)  SpO2: 94% (27 Nov 2023 04:00) (94% - 98%)    O2 Parameters below as of 27 Nov 2023 04:00  Patient On (Oxygen Delivery Method): room air              CAPILLARY BLOOD GLUCOSE          PHYSICAL EXAM:  General: WN/WD NAD  Neurology: A&Ox3, nonfocal, MORAN x 4  Eyes: PERRLA/ EOMI, Gross vision intact  ENT/Neck: Neck supple, trachea midline, No JVD, Gross hearing intact  Respiratory: CTA B/L, No wheezing, rales, rhonchi  CV: RRR, +S1/S2, -S3/S4, no murmurs, rubs or gallops  Abdominal: Soft, NT, ND +BS, No HSM  MSK: 5/5 strength UE/LE bilaterally  Extremities: No edema, 2+ peripheral pulses  Skin: No Rashes, Hematoma, Ecchymosis  Incisions:   Tubes:    HOSPITAL MEDICATIONS:  MEDICATIONS  (STANDING):  cholecalciferol 1000 Unit(s) Oral daily  enoxaparin Injectable 40 milliGRAM(s) SubCutaneous every 24 hours  influenza  Vaccine (HIGH DOSE) 0.7 milliLiter(s) IntraMuscular once  lidocaine   4% Patch 1 Patch Transdermal daily  polyethylene glycol 3350 17 Gram(s) Oral daily  senna 1 Tablet(s) Oral at bedtime    MEDICATIONS  (PRN):  acetaminophen     Tablet .. 650 milliGRAM(s) Oral every 6 hours PRN Mild Pain (1 - 3)  meclizine 25 milliGRAM(s) Oral every 8 hours PRN Dizziness  melatonin 3 milliGRAM(s) Oral at bedtime PRN Insomnia  oxyCODONE    IR 2.5 milliGRAM(s) Oral every 6 hours PRN Moderate Pain (4 - 6)  oxyCODONE    IR 5 milliGRAM(s) Oral every 6 hours PRN Severe Pain (7 - 10)      LABS:                        12.5   9.19  )-----------( 328      ( 26 Nov 2023 05:00 )             38.9     Hgb Trend: 12.5<--, 12.6<--, 11.8<--, 12.5<--  11-26    140  |  107  |  16  ----------------------------<  89  4.1   |  24  |  0.56    Ca    8.6      26 Nov 2023 05:00  Phos  2.5     11-26  Mg     2.30     11-26      Creatinine Trend: 0.56<--, 0.58<--, 0.59<--, 0.52<--    Urinalysis Basic - ( 26 Nov 2023 05:00 )    Color: x / Appearance: x / SG: x / pH: x  Gluc: 89 mg/dL / Ketone: x  / Bili: x / Urobili: x   Blood: x / Protein: x / Nitrite: x   Leuk Esterase: x / RBC: x / WBC x   Sq Epi: x / Non Sq Epi: x / Bacteria: x            MICROBIOLOGY:          Dain Marley | PGY2| Pager: [place] [phone]) -- [place] ([phone])  Interval Events: NAEON    REVIEW OF SYSTEMS:  CONSTITUTIONAL: No weakness, fevers or chills  EYES/ENT: No visual changes;  No vertigo or throat pain   NECK: No pain or stiffness  RESPIRATORY: No cough, wheezing, hemoptysis; No shortness of breath  CARDIOVASCULAR: No chest pain or palpitations  GASTROINTESTINAL: No abdominal or epigastric pain. No nausea, vomiting, or hematemesis; No diarrhea or constipation. No melena or hematochezia.  GENITOURINARY: No dysuria, frequency or hematuria  NEUROLOGICAL: No numbness or weakness  SKIN: No itching, burning, rashes, or lesions   All other review of systems is negative unless indicated above.    OBJECTIVE:  ICU Vital Signs Last 24 Hrs  T(C): 36.7 (27 Nov 2023 04:00), Max: 36.7 (26 Nov 2023 14:06)  T(F): 98 (27 Nov 2023 04:00), Max: 98.1 (26 Nov 2023 14:06)  HR: 77 (27 Nov 2023 04:00) (72 - 78)  BP: 135/56 (27 Nov 2023 04:00) (120/67 - 139/50)  BP(mean): 76 (27 Nov 2023 04:00) (76 - 76)  ABP: --  ABP(mean): --  RR: 16 (27 Nov 2023 04:00) (16 - 19)  SpO2: 94% (27 Nov 2023 04:00) (94% - 98%)    O2 Parameters below as of 27 Nov 2023 04:00  Patient On (Oxygen Delivery Method): room air              CAPILLARY BLOOD GLUCOSE          PHYSICAL EXAM:  General: WN/WD NAD  Neurology: A&Ox3, nonfocal, MORAN x 4  Eyes: PERRLA/ EOMI, Gross vision intact  ENT/Neck: Neck supple, trachea midline, No JVD, Gross hearing intact  Respiratory: CTA B/L, No wheezing, rales, rhonchi  CV: RRR, +S1/S2, -S3/S4, no murmurs, rubs or gallops  Abdominal: Soft, NT, ND +BS, No HSM  MSK: 5/5 strength UE/LE bilaterally  Extremities: No edema, 2+ peripheral pulses  Skin: No Rashes, Hematoma, Ecchymosis  Incisions:   Tubes:    HOSPITAL MEDICATIONS:  MEDICATIONS  (STANDING):  cholecalciferol 1000 Unit(s) Oral daily  enoxaparin Injectable 40 milliGRAM(s) SubCutaneous every 24 hours  influenza  Vaccine (HIGH DOSE) 0.7 milliLiter(s) IntraMuscular once  lidocaine   4% Patch 1 Patch Transdermal daily  polyethylene glycol 3350 17 Gram(s) Oral daily  senna 1 Tablet(s) Oral at bedtime    MEDICATIONS  (PRN):  acetaminophen     Tablet .. 650 milliGRAM(s) Oral every 6 hours PRN Mild Pain (1 - 3)  meclizine 25 milliGRAM(s) Oral every 8 hours PRN Dizziness  melatonin 3 milliGRAM(s) Oral at bedtime PRN Insomnia  oxyCODONE    IR 2.5 milliGRAM(s) Oral every 6 hours PRN Moderate Pain (4 - 6)  oxyCODONE    IR 5 milliGRAM(s) Oral every 6 hours PRN Severe Pain (7 - 10)      LABS:                        12.5   9.19  )-----------( 328      ( 26 Nov 2023 05:00 )             38.9     Hgb Trend: 12.5<--, 12.6<--, 11.8<--, 12.5<--  11-26    140  |  107  |  16  ----------------------------<  89  4.1   |  24  |  0.56    Ca    8.6      26 Nov 2023 05:00  Phos  2.5     11-26  Mg     2.30     11-26      Creatinine Trend: 0.56<--, 0.58<--, 0.59<--, 0.52<--    Urinalysis Basic - ( 26 Nov 2023 05:00 )    Color: x / Appearance: x / SG: x / pH: x  Gluc: 89 mg/dL / Ketone: x  / Bili: x / Urobili: x   Blood: x / Protein: x / Nitrite: x   Leuk Esterase: x / RBC: x / WBC x   Sq Epi: x / Non Sq Epi: x / Bacteria: x            MICROBIOLOGY:          Dain Marley | PGY2| Pager: [place] [phone]) -- [place] ([phone])  Interval Events: NAEON    REVIEW OF SYSTEMS:  CONSTITUTIONAL: No weakness, fevers or chills  NECK: No pain or stiffness  RESPIRATORY: No cough, wheezing, hemoptysis; No shortness of breath  CARDIOVASCULAR: No chest pain or palpitations  GENITOURINARY: No dysuria, frequency or hematuria  NEUROLOGICAL: No numbness or weakness  SKIN: No itching, burning, rashes, or lesions   All other review of systems is negative unless indicated above.    OBJECTIVE:  ICU Vital Signs Last 24 Hrs  T(C): 36.7 (27 Nov 2023 04:00), Max: 36.7 (26 Nov 2023 14:06)  T(F): 98 (27 Nov 2023 04:00), Max: 98.1 (26 Nov 2023 14:06)  HR: 77 (27 Nov 2023 04:00) (72 - 78)  BP: 135/56 (27 Nov 2023 04:00) (120/67 - 139/50)  BP(mean): 76 (27 Nov 2023 04:00) (76 - 76)  ABP: --  ABP(mean): --  RR: 16 (27 Nov 2023 04:00) (16 - 19)  SpO2: 94% (27 Nov 2023 04:00) (94% - 98%)    O2 Parameters below as of 27 Nov 2023 04:00  Patient On (Oxygen Delivery Method): room air              CAPILLARY BLOOD GLUCOSE          PHYSICAL EXAM:  General: WN/WD NAD  Neurology: A&Ox3, nonfocal, MORAN x 4  ENT/Neck: Neck supple, trachea midline, No JVD, Gross hearing intact  Respiratory: CTA B/L, No wheezing, rales, rhonchi  CV: RRR, +S1/S2, -S3/S4, no murmurs, rubs or gallops  Abdominal: Soft, NT, ND +BS, No HSM  Extremities: No edema, 2+ peripheral pulses  Skin: No Rashes, Hematoma, Ecchymosis    HOSPITAL MEDICATIONS:  MEDICATIONS  (STANDING):  cholecalciferol 1000 Unit(s) Oral daily  enoxaparin Injectable 40 milliGRAM(s) SubCutaneous every 24 hours  influenza  Vaccine (HIGH DOSE) 0.7 milliLiter(s) IntraMuscular once  lidocaine   4% Patch 1 Patch Transdermal daily  polyethylene glycol 3350 17 Gram(s) Oral daily  senna 1 Tablet(s) Oral at bedtime    MEDICATIONS  (PRN):  acetaminophen     Tablet .. 650 milliGRAM(s) Oral every 6 hours PRN Mild Pain (1 - 3)  meclizine 25 milliGRAM(s) Oral every 8 hours PRN Dizziness  melatonin 3 milliGRAM(s) Oral at bedtime PRN Insomnia  oxyCODONE    IR 2.5 milliGRAM(s) Oral every 6 hours PRN Moderate Pain (4 - 6)  oxyCODONE    IR 5 milliGRAM(s) Oral every 6 hours PRN Severe Pain (7 - 10)      LABS:                        12.5   9.19  )-----------( 328      ( 26 Nov 2023 05:00 )             38.9     Hgb Trend: 12.5<--, 12.6<--, 11.8<--, 12.5<--  11-26    140  |  107  |  16  ----------------------------<  89  4.1   |  24  |  0.56    Ca    8.6      26 Nov 2023 05:00  Phos  2.5     11-26  Mg     2.30     11-26      Creatinine Trend: 0.56<--, 0.58<--, 0.59<--, 0.52<--    Urinalysis Basic - ( 26 Nov 2023 05:00 )    Color: x / Appearance: x / SG: x / pH: x  Gluc: 89 mg/dL / Ketone: x  / Bili: x / Urobili: x   Blood: x / Protein: x / Nitrite: x   Leuk Esterase: x / RBC: x / WBC x   Sq Epi: x / Non Sq Epi: x / Bacteria: x            MICROBIOLOGY:          Dain Marley | PGY2| Pager: [place] ([phone]) -- [place] ([phone])  Interval Events: NAEON    REVIEW OF SYSTEMS:  CONSTITUTIONAL: No weakness, fevers or chills  NECK: No pain or stiffness  RESPIRATORY: No cough, wheezing, hemoptysis; No shortness of breath  CARDIOVASCULAR: No chest pain or palpitations  GENITOURINARY: No dysuria, frequency or hematuria  NEUROLOGICAL: No numbness or weakness  SKIN: No itching, burning, rashes, or lesions   All other review of systems is negative unless indicated above.    OBJECTIVE:  ICU Vital Signs Last 24 Hrs  T(C): 36.7 (27 Nov 2023 04:00), Max: 36.7 (26 Nov 2023 14:06)  T(F): 98 (27 Nov 2023 04:00), Max: 98.1 (26 Nov 2023 14:06)  HR: 77 (27 Nov 2023 04:00) (72 - 78)  BP: 135/56 (27 Nov 2023 04:00) (120/67 - 139/50)  BP(mean): 76 (27 Nov 2023 04:00) (76 - 76)  ABP: --  ABP(mean): --  RR: 16 (27 Nov 2023 04:00) (16 - 19)  SpO2: 94% (27 Nov 2023 04:00) (94% - 98%)    O2 Parameters below as of 27 Nov 2023 04:00  Patient On (Oxygen Delivery Method): room air              CAPILLARY BLOOD GLUCOSE          PHYSICAL EXAM:  General: WN/WD NAD  Neurology: A&Ox3, nonfocal, MORAN x 4  ENT/Neck: Neck supple, trachea midline, No JVD, Gross hearing intact  Respiratory: CTA B/L, No wheezing, rales, rhonchi  CV: RRR, +S1/S2, -S3/S4, no murmurs, rubs or gallops  Abdominal: Soft, NT, ND +BS, No HSM  Extremities: No edema, 2+ peripheral pulses  Skin: No Rashes, Hematoma, Ecchymosis    HOSPITAL MEDICATIONS:  MEDICATIONS  (STANDING):  cholecalciferol 1000 Unit(s) Oral daily  enoxaparin Injectable 40 milliGRAM(s) SubCutaneous every 24 hours  influenza  Vaccine (HIGH DOSE) 0.7 milliLiter(s) IntraMuscular once  lidocaine   4% Patch 1 Patch Transdermal daily  polyethylene glycol 3350 17 Gram(s) Oral daily  senna 1 Tablet(s) Oral at bedtime    MEDICATIONS  (PRN):  acetaminophen     Tablet .. 650 milliGRAM(s) Oral every 6 hours PRN Mild Pain (1 - 3)  meclizine 25 milliGRAM(s) Oral every 8 hours PRN Dizziness  melatonin 3 milliGRAM(s) Oral at bedtime PRN Insomnia  oxyCODONE    IR 2.5 milliGRAM(s) Oral every 6 hours PRN Moderate Pain (4 - 6)  oxyCODONE    IR 5 milliGRAM(s) Oral every 6 hours PRN Severe Pain (7 - 10)      LABS:                        12.5   9.19  )-----------( 328      ( 26 Nov 2023 05:00 )             38.9     Hgb Trend: 12.5<--, 12.6<--, 11.8<--, 12.5<--  11-26    140  |  107  |  16  ----------------------------<  89  4.1   |  24  |  0.56    Ca    8.6      26 Nov 2023 05:00  Phos  2.5     11-26  Mg     2.30     11-26      Creatinine Trend: 0.56<--, 0.58<--, 0.59<--, 0.52<--    Urinalysis Basic - ( 26 Nov 2023 05:00 )    Color: x / Appearance: x / SG: x / pH: x  Gluc: 89 mg/dL / Ketone: x  / Bili: x / Urobili: x   Blood: x / Protein: x / Nitrite: x   Leuk Esterase: x / RBC: x / WBC x   Sq Epi: x / Non Sq Epi: x / Bacteria: x            MICROBIOLOGY:          Dain Marley | PGY2| Pager: [place] ([phone]) -- [place] ([id number])  Interval Events: NAEON    REVIEW OF SYSTEMS:  CONSTITUTIONAL: No weakness, fevers or chills  NECK: No pain or stiffness  RESPIRATORY: No cough, wheezing, hemoptysis; No shortness of breath  CARDIOVASCULAR: No chest pain or palpitations  GENITOURINARY: No dysuria, frequency or hematuria  NEUROLOGICAL: No numbness or weakness  SKIN: No itching, burning, rashes, or lesions   All other review of systems is negative unless indicated above.    OBJECTIVE:  ICU Vital Signs Last 24 Hrs  T(C): 36.7 (27 Nov 2023 04:00), Max: 36.7 (26 Nov 2023 14:06)  T(F): 98 (27 Nov 2023 04:00), Max: 98.1 (26 Nov 2023 14:06)  HR: 77 (27 Nov 2023 04:00) (72 - 78)  BP: 135/56 (27 Nov 2023 04:00) (120/67 - 139/50)  BP(mean): 76 (27 Nov 2023 04:00) (76 - 76)  ABP: --  ABP(mean): --  RR: 16 (27 Nov 2023 04:00) (16 - 19)  SpO2: 94% (27 Nov 2023 04:00) (94% - 98%)    O2 Parameters below as of 27 Nov 2023 04:00  Patient On (Oxygen Delivery Method): room air              CAPILLARY BLOOD GLUCOSE          PHYSICAL EXAM:  General: WN/WD NAD  Neurology: A&Ox3, nonfocal, MORAN x 4  ENT/Neck: Neck supple, trachea midline, No JVD, Gross hearing intact  Respiratory: CTA B/L, No wheezing, rales, rhonchi  CV: RRR, +S1/S2, -S3/S4, no murmurs, rubs or gallops  Abdominal: Soft, NT, ND +BS, No HSM  Extremities: No edema, 2+ peripheral pulses  Skin: No Rashes, Hematoma, Ecchymosis    HOSPITAL MEDICATIONS:  MEDICATIONS  (STANDING):  cholecalciferol 1000 Unit(s) Oral daily  enoxaparin Injectable 40 milliGRAM(s) SubCutaneous every 24 hours  influenza  Vaccine (HIGH DOSE) 0.7 milliLiter(s) IntraMuscular once  lidocaine   4% Patch 1 Patch Transdermal daily  polyethylene glycol 3350 17 Gram(s) Oral daily  senna 1 Tablet(s) Oral at bedtime    MEDICATIONS  (PRN):  acetaminophen     Tablet .. 650 milliGRAM(s) Oral every 6 hours PRN Mild Pain (1 - 3)  meclizine 25 milliGRAM(s) Oral every 8 hours PRN Dizziness  melatonin 3 milliGRAM(s) Oral at bedtime PRN Insomnia  oxyCODONE    IR 2.5 milliGRAM(s) Oral every 6 hours PRN Moderate Pain (4 - 6)  oxyCODONE    IR 5 milliGRAM(s) Oral every 6 hours PRN Severe Pain (7 - 10)      LABS:                        12.5   9.19  )-----------( 328      ( 26 Nov 2023 05:00 )             38.9     Hgb Trend: 12.5<--, 12.6<--, 11.8<--, 12.5<--  11-26    140  |  107  |  16  ----------------------------<  89  4.1   |  24  |  0.56    Ca    8.6      26 Nov 2023 05:00  Phos  2.5     11-26  Mg     2.30     11-26      Creatinine Trend: 0.56<--, 0.58<--, 0.59<--, 0.52<--    Urinalysis Basic - ( 26 Nov 2023 05:00 )    Color: x / Appearance: x / SG: x / pH: x  Gluc: 89 mg/dL / Ketone: x  / Bili: x / Urobili: x   Blood: x / Protein: x / Nitrite: x   Leuk Esterase: x / RBC: x / WBC x   Sq Epi: x / Non Sq Epi: x / Bacteria: x            MICROBIOLOGY:

## 2023-11-27 NOTE — PROGRESS NOTE ADULT - ATTENDING COMMENTS
clinically well, neurologically at baseline  TTE ok   F.U [facility]RA read   if no objection to dc by neuro and neurosx dc home today w out-pt f.u

## 2023-11-27 NOTE — PROGRESS NOTE ADULT - PROBLEM SELECTOR PLAN 1
Per patient story, patient slipped on the ground and fell. Denies dizziness, chest palpitations, or LOC at the time.  Orthostatics OK.  EKG obtained in ED NSR.  TTE EF 59%, overall reassuring  - D/c tele (mechanical fall)  - PT Consult in. Per patient story, patient slipped on the ground and fell. Denies dizziness, chest palpitations, or LOC at the time.  Orthostatics OK.  EKG obtained in ED NSR.  TTE EF 59%, overall reassuring  - D/c tele (mechanical fall)  - Pending Home PT at [place] Per patient story, patient slipped on the ground and fell. Denies dizziness, chest palpitations, or LOC at the time.  Orthostatics OK.  EKG obtained in ED NSR.  TTE EF 59%, overall reassuring  - D/c tele (mechanical fall)  - Pending Home PT at [place] Per patient story, patient slipped on the ground and fell. Denies dizziness, chest palpitations, or LOC at the time.  Orthostatics OK.  EKG obtained in ED NSR.  TTE EF 59%, overall reassuring  - D/c tele (mechanical fall)  - Pending Home PT at [place]

## 2023-11-28 ENCOUNTER — TRANSCRIPTION ENCOUNTER (OUTPATIENT)
Age: 78
End: 2023-11-28

## 2023-11-28 PROCEDURE — 99232 SBSQ HOSP IP/OBS MODERATE 35: CPT | Mod: GC

## 2023-11-28 RX ORDER — SENNA PLUS 8.6 MG/1
1 TABLET ORAL
Qty: 30 | Refills: 0
Start: 2023-11-28 | End: 2023-12-27

## 2023-11-28 RX ORDER — CHOLECALCIFEROL (VITAMIN D3) 125 MCG
1 CAPSULE ORAL
Qty: 30 | Refills: 0
Start: 2023-11-28 | End: 2023-12-27

## 2023-11-28 RX ORDER — POLYETHYLENE GLYCOL 3350 17 G/17G
17 POWDER, FOR SOLUTION ORAL
Qty: 510 | Refills: 0
Start: 2023-11-28 | End: 2023-12-27

## 2023-11-28 RX ORDER — OXYCODONE HYDROCHLORIDE 5 MG/1
1 TABLET ORAL
Qty: 12 | Refills: 0
Start: 2023-11-28 | End: 2023-11-30

## 2023-11-28 RX ORDER — NALOXONE HYDROCHLORIDE 4 MG/.1ML
4 SPRAY NASAL
Qty: 1 | Refills: 0
Start: 2023-11-28

## 2023-11-28 RX ORDER — LIDOCAINE 4 G/100G
1 CREAM TOPICAL
Qty: 30 | Refills: 0
Start: 2023-11-28 | End: 2023-12-27

## 2023-11-28 RX ORDER — ACETAMINOPHEN 500 MG
2 TABLET ORAL
Qty: 240 | Refills: 0
Start: 2023-11-28 | End: 2023-12-27

## 2023-11-28 RX ADMIN — ENOXAPARIN SODIUM 40 MILLIGRAM(S): 100 INJECTION SUBCUTANEOUS at 13:01

## 2023-11-28 RX ADMIN — LIDOCAINE 1 PATCH: 4 CREAM TOPICAL at 18:14

## 2023-11-28 RX ADMIN — Medication 1000 UNIT(S): at 13:02

## 2023-11-28 RX ADMIN — Medication 650 MILLIGRAM(S): at 22:09

## 2023-11-28 RX ADMIN — Medication 650 MILLIGRAM(S): at 12:10

## 2023-11-28 RX ADMIN — POLYETHYLENE GLYCOL 3350 17 GRAM(S): 17 POWDER, FOR SOLUTION ORAL at 13:02

## 2023-11-28 RX ADMIN — LIDOCAINE 1 PATCH: 4 CREAM TOPICAL at 13:02

## 2023-11-28 RX ADMIN — Medication 650 MILLIGRAM(S): at 11:10

## 2023-11-28 RX ADMIN — SENNA PLUS 1 TABLET(S): 8.6 TABLET ORAL at 22:08

## 2023-11-28 RX ADMIN — Medication 650 MILLIGRAM(S): at 23:09

## 2023-11-28 NOTE — PROGRESS NOTE ADULT - SUBJECTIVE AND OBJECTIVE BOX
***************************************************************  Geoff Travis, PG1  Internal Medicine   Pager:  TEAMS  ***************************************************************    KIM GARRISON  78y  MRN: [id number]    Patient is a 78y old  Female who presents with a chief complaint of Dizziness (27 Nov 2023 06:14)      Interval/Overnight Events: no events ON.     Subjective: Pt seen and examined at bedside. Denies fever, CP, SOB, abn pain, N/V, dysuria. Tolerating diet.      MEDICATIONS  (STANDING):  cholecalciferol 1000 Unit(s) Oral daily  enoxaparin Injectable 40 milliGRAM(s) SubCutaneous every 24 hours  influenza  Vaccine (HIGH DOSE) 0.7 milliLiter(s) IntraMuscular once  lidocaine   4% Patch 1 Patch Transdermal daily  polyethylene glycol 3350 17 Gram(s) Oral daily  senna 1 Tablet(s) Oral at bedtime    MEDICATIONS  (PRN):  acetaminophen     Tablet .. 650 milliGRAM(s) Oral every 6 hours PRN Mild Pain (1 - 3)  meclizine 25 milliGRAM(s) Oral every 8 hours PRN Dizziness  melatonin 3 milliGRAM(s) Oral at bedtime PRN Insomnia  oxyCODONE    IR 2.5 milliGRAM(s) Oral every 6 hours PRN Moderate Pain (4 - 6)  oxyCODONE    IR 5 milliGRAM(s) Oral every 6 hours PRN Severe Pain (7 - 10)      Objective:    Vitals: Vital Signs Last 24 Hrs  T(C): 36.6 (11-28-23 @ 05:36), Max: 36.8 (11-27-23 @ 19:12)  T(F): 97.8 (11-28-23 @ 05:36), Max: 98.3 (11-27-23 @ 19:12)  HR: 71 (11-28-23 @ 05:36) (61 - 84)  BP: 126/47 (11-28-23 @ 05:36) (114/62 - 150/55)  BP(mean): --  RR: 18 (11-28-23 @ 05:36) (18 - 18)  SpO2: 96% (11-28-23 @ 05:36) (94% - 99%)                I&O's Summary      PHYSICAL EXAM:  GENERAL: NAD  HEAD:  Atraumatic, Normocephalic  EYES: EOMI except for left eye abduction, conjunctiva and sclera clear  CHEST/LUNG: Clear to auscultation bilaterally; No rales, rhonchi, wheezing, or rubs.  L midaxillary line ribs TTP  HEART: Regular rate and rhythm; No murmurs, rubs, or gallops  ABDOMEN: Soft, Nontender, Nondistended;   SKIN: No rashes or lesions  NERVOUS SYSTEM:  Alert & Oriented X3, no focal deficits    LABS:                        12.5   8.48  )-----------( 347      ( 27 Nov 2023 06:41 )             38.0                         12.5   9.19  )-----------( 328      ( 26 Nov 2023 05:00 )             38.9                         12.6   9.75  )-----------( 344      ( 25 Nov 2023 06:35 )             38.7     11-27    140  |  105  |  12  ----------------------------<  94  4.2   |  24  |  0.56  11-26    140  |  107  |  16  ----------------------------<  89  4.1   |  24  |  0.56  11-25    142  |  107  |  18  ----------------------------<  89  4.0   |  24  |  0.58    Ca    8.8      27 Nov 2023 06:41  Ca    8.6      26 Nov 2023 05:00  Ca    8.8      25 Nov 2023 06:35  Phos  2.6     11-27  Mg     2.50     11-27      CAPILLARY BLOOD GLUCOSE            Urinalysis Basic - ( 27 Nov 2023 06:41 )    Color: x / Appearance: x / SG: x / pH: x  Gluc: 94 mg/dL / Ketone: x  / Bili: x / Urobili: x   Blood: x / Protein: x / Nitrite: x   Leuk Esterase: x / RBC: x / WBC x   Sq Epi: x / Non Sq Epi: x / Bacteria: x          RADIOLOGY & ADDITIONAL TESTS:    Imaging Personally Reviewed:  [x ] YES  [ ] NO    Consultants involved in case:   Consultant(s) Notes Reviewed:  [ x] YES  [ ] NO:   Care Discussed with Consultants/Other Providers [x ] YES  [ ] NO         ***************************************************************  Geoff Travis, PG1  Internal Medicine   Pager:  TEAMS  ***************************************************************    KIM GARRISON  78y  MRN: [id number]    Patient is a 78y old  Female who presents with a chief complaint of Dizziness (27 Nov 2023 06:14)      Interval/Overnight Events: no events ON.     Subjective: Pt seen and examined at bedside. Denies fever, CP, SOB, abn pain, N/V, dysuria. Tolerating diet.      MEDICATIONS  (STANDING):  cholecalciferol 1000 Unit(s) Oral daily  enoxaparin Injectable 40 milliGRAM(s) SubCutaneous every 24 hours  influenza  Vaccine (HIGH DOSE) 0.7 milliLiter(s) IntraMuscular once  lidocaine   4% Patch 1 Patch Transdermal daily  polyethylene glycol 3350 17 Gram(s) Oral daily  senna 1 Tablet(s) Oral at bedtime    MEDICATIONS  (PRN):  acetaminophen     Tablet .. 650 milliGRAM(s) Oral every 6 hours PRN Mild Pain (1 - 3)  meclizine 25 milliGRAM(s) Oral every 8 hours PRN Dizziness  melatonin 3 milliGRAM(s) Oral at bedtime PRN Insomnia  oxyCODONE    IR 2.5 milliGRAM(s) Oral every 6 hours PRN Moderate Pain (4 - 6)  oxyCODONE    IR 5 milliGRAM(s) Oral every 6 hours PRN Severe Pain (7 - 10)      Objective:    Vitals: Vital Signs Last 24 Hrs  T(C): 36.6 (11-28-23 @ 05:36), Max: 36.8 (11-27-23 @ 19:12)  T(F): 97.8 (11-28-23 @ 05:36), Max: 98.3 (11-27-23 @ 19:12)  HR: 71 (11-28-23 @ 05:36) (61 - 84)  BP: 126/47 (11-28-23 @ 05:36) (114/62 - 150/55)  BP(mean): --  RR: 18 (11-28-23 @ 05:36) (18 - 18)  SpO2: 96% (11-28-23 @ 05:36) (94% - 99%)                I&O's Summary      PHYSICAL EXAM:  GENERAL: NAD  HEAD:  Atraumatic, Normocephalic  EYES: EOMI except for left eye abduction, conjunctiva and sclera clear  CHEST/LUNG: Clear to auscultation bilaterally; No rales, rhonchi, wheezing, or rubs.  L midaxillary line ribs TTP  HEART: Regular rate and rhythm; No murmurs, rubs, or gallops  ABDOMEN: Soft, Nontender, Nondistended;   SKIN: No rashes or lesions  NERVOUS SYSTEM:  Alert & Oriented X3, no focal deficits    LABS:                        12.5   8.48  )-----------( 347      ( 27 Nov 2023 06:41 )             38.0                         12.5   9.19  )-----------( 328      ( 26 Nov 2023 05:00 )             38.9                         12.6   9.75  )-----------( 344      ( 25 Nov 2023 06:35 )             38.7     11-27    140  |  105  |  12  ----------------------------<  94  4.2   |  24  |  0.56  11-26    140  |  107  |  16  ----------------------------<  89  4.1   |  24  |  0.56  11-25    142  |  107  |  18  ----------------------------<  89  4.0   |  24  |  0.58    Ca    8.8      27 Nov 2023 06:41  Ca    8.6      26 Nov 2023 05:00  Ca    8.8      25 Nov 2023 06:35  Phos  2.6     11-27  Mg     2.50     11-27      CAPILLARY BLOOD GLUCOSE            Urinalysis Basic - ( 27 Nov 2023 06:41 )    Color: x / Appearance: x / SG: x / pH: x  Gluc: 94 mg/dL / Ketone: x  / Bili: x / Urobili: x   Blood: x / Protein: x / Nitrite: x   Leuk Esterase: x / RBC: x / WBC x   Sq Epi: x / Non Sq Epi: x / Bacteria: x          RADIOLOGY & ADDITIONAL TESTS:    Imaging Personally Reviewed:  [x ] YES  [ ] NO    Consultants involved in case:   Consultant(s) Notes Reviewed:  [ x] YES  [ ] NO:   Care Discussed with Consultants/Other Providers [x ] YES  [ ] NO         ***************************************************************  Geoff Travis, PG1  Internal Medicine   Pager:  TEAMS  ***************************************************************    KIM GARRISON  78y  MRN: [id number]    Patient is a 78y old  Female who presents with a chief complaint of Dizziness (27 Nov 2023 06:14)      Interval/Overnight Events: no events ON.     Subjective: Pt seen and examined at bedside. Denies fever, CP, SOB, abn pain, N/V, dysuria. Tolerating diet.      MEDICATIONS  (STANDING):  cholecalciferol 1000 Unit(s) Oral daily  enoxaparin Injectable 40 milliGRAM(s) SubCutaneous every 24 hours  influenza  Vaccine (HIGH DOSE) 0.7 milliLiter(s) IntraMuscular once  lidocaine   4% Patch 1 Patch Transdermal daily  polyethylene glycol 3350 17 Gram(s) Oral daily  senna 1 Tablet(s) Oral at bedtime    MEDICATIONS  (PRN):  acetaminophen     Tablet .. 650 milliGRAM(s) Oral every 6 hours PRN Mild Pain (1 - 3)  meclizine 25 milliGRAM(s) Oral every 8 hours PRN Dizziness  melatonin 3 milliGRAM(s) Oral at bedtime PRN Insomnia  oxyCODONE    IR 2.5 milliGRAM(s) Oral every 6 hours PRN Moderate Pain (4 - 6)  oxyCODONE    IR 5 milliGRAM(s) Oral every 6 hours PRN Severe Pain (7 - 10)      Objective:    Vitals: Vital Signs Last 24 Hrs  T(C): 36.6 (11-28-23 @ 05:36), Max: 36.8 (11-27-23 @ 19:12)  T(F): 97.8 (11-28-23 @ 05:36), Max: 98.3 (11-27-23 @ 19:12)  HR: 71 (11-28-23 @ 05:36) (61 - 84)  BP: 126/47 (11-28-23 @ 05:36) (114/62 - 150/55)  BP(mean): --  RR: 18 (11-28-23 @ 05:36) (18 - 18)  SpO2: 96% (11-28-23 @ 05:36) (94% - 99%)                I&O's Summary      PHYSICAL EXAM:  GENERAL: NAD  HEAD:  Atraumatic, Normocephalic  EYES: EOMI except for left eye abduction, conjunctiva and sclera clear  CHEST/LUNG: Clear to auscultation bilaterally; No rales, rhonchi, wheezing, or rubs.  L midaxillary line ribs TTP  HEART: Regular rate and rhythm; No murmurs, rubs, or gallops  ABDOMEN: Soft, Nontender, Nondistended;   SKIN: No rashes or lesions  NERVOUS SYSTEM:  Alert & Oriented X3, no focal deficits    LABS:                        12.5   8.48  )-----------( 347      ( 27 Nov 2023 06:41 )             38.0                         12.5   9.19  )-----------( 328      ( 26 Nov 2023 05:00 )             38.9                         12.6   9.75  )-----------( 344      ( 25 Nov 2023 06:35 )             38.7     11-27    140  |  105  |  12  ----------------------------<  94  4.2   |  24  |  0.56  11-26    140  |  107  |  16  ----------------------------<  89  4.1   |  24  |  0.56  11-25    142  |  107  |  18  ----------------------------<  89  4.0   |  24  |  0.58    Ca    8.8      27 Nov 2023 06:41  Ca    8.6      26 Nov 2023 05:00  Ca    8.8      25 Nov 2023 06:35  Phos  2.6     11-27  Mg     2.50     11-27      CAPILLARY BLOOD GLUCOSE            Urinalysis Basic - ( 27 Nov 2023 06:41 )    Color: x / Appearance: x / SG: x / pH: x  Gluc: 94 mg/dL / Ketone: x  / Bili: x / Urobili: x   Blood: x / Protein: x / Nitrite: x   Leuk Esterase: x / RBC: x / WBC x   Sq Epi: x / Non Sq Epi: x / Bacteria: x          RADIOLOGY & ADDITIONAL TESTS:    Imaging Personally Reviewed:  [x ] YES  [ ] NO    Consultants involved in case:   Consultant(s) Notes Reviewed:  [ x] YES  [ ] NO:   Care Discussed with Consultants/Other Providers [x ] YES  [ ] NO           KMI GARRISON  78y  MRN: [id number]    Patient is a 78y old  Female who presents with a chief complaint of Dizziness (27 Nov 2023 06:14)      Interval/Overnight Events: no events ON.     Subjective: Pt seen and examined at bedside. Denies fever, CP, SOB, abn pain, N/V, dysuria. Tolerating diet.      MEDICATIONS  (STANDING):  cholecalciferol 1000 Unit(s) Oral daily  enoxaparin Injectable 40 milliGRAM(s) SubCutaneous every 24 hours  influenza  Vaccine (HIGH DOSE) 0.7 milliLiter(s) IntraMuscular once  lidocaine   4% Patch 1 Patch Transdermal daily  polyethylene glycol 3350 17 Gram(s) Oral daily  senna 1 Tablet(s) Oral at bedtime    MEDICATIONS  (PRN):  acetaminophen     Tablet .. 650 milliGRAM(s) Oral every 6 hours PRN Mild Pain (1 - 3)  meclizine 25 milliGRAM(s) Oral every 8 hours PRN Dizziness  melatonin 3 milliGRAM(s) Oral at bedtime PRN Insomnia  oxyCODONE    IR 2.5 milliGRAM(s) Oral every 6 hours PRN Moderate Pain (4 - 6)  oxyCODONE    IR 5 milliGRAM(s) Oral every 6 hours PRN Severe Pain (7 - 10)      Objective:    Vitals: Vital Signs Last 24 Hrs  T(C): 36.6 (11-28-23 @ 05:36), Max: 36.8 (11-27-23 @ 19:12)  T(F): 97.8 (11-28-23 @ 05:36), Max: 98.3 (11-27-23 @ 19:12)  HR: 71 (11-28-23 @ 05:36) (61 - 84)  BP: 126/47 (11-28-23 @ 05:36) (114/62 - 150/55)  BP(mean): --  RR: 18 (11-28-23 @ 05:36) (18 - 18)  SpO2: 96% (11-28-23 @ 05:36) (94% - 99%)                I&O's Summary      PHYSICAL EXAM:  General: Patient in NAD  HEENT: NCAT, PERRL, no oral lesions  CV: S1+S2, no m/r/g appreciated, tenderness of L midaxillary    Lungs: No respiratory distress, CTAB  Abd: Soft, nontender, no guarding, no rebound tenderness, + bowel sounds   : No suprapubic tenderness  Neuro: Alert and oriented to time, place and person. No focal deficits noted.   Ext: No cyanosis, no edema  Skin: No rash, no phlebitis      LABS:                        12.5   8.48  )-----------( 347      ( 27 Nov 2023 06:41 )             38.0                         12.5   9.19  )-----------( 328      ( 26 Nov 2023 05:00 )             38.9                         12.6   9.75  )-----------( 344      ( 25 Nov 2023 06:35 )             38.7     11-27    140  |  105  |  12  ----------------------------<  94  4.2   |  24  |  0.56  11-26    140  |  107  |  16  ----------------------------<  89  4.1   |  24  |  0.56  11-25    142  |  107  |  18  ----------------------------<  89  4.0   |  24  |  0.58    Ca    8.8      27 Nov 2023 06:41  Ca    8.6      26 Nov 2023 05:00  Ca    8.8      25 Nov 2023 06:35  Phos  2.6     11-27  Mg     2.50     11-27      CAPILLARY BLOOD GLUCOSE            Urinalysis Basic - ( 27 Nov 2023 06:41 )    Color: x / Appearance: x / SG: x / pH: x  Gluc: 94 mg/dL / Ketone: x  / Bili: x / Urobili: x   Blood: x / Protein: x / Nitrite: x   Leuk Esterase: x / RBC: x / WBC x   Sq Epi: x / Non Sq Epi: x / Bacteria: x          RADIOLOGY & ADDITIONAL TESTS:    Imaging Personally Reviewed:  [x ] YES  [ ] NO    Consultants involved in case:   Consultant(s) Notes Reviewed:  [ x] YES  [ ] NO:   Care Discussed with Consultants/Other Providers [x ] YES  [ ] NO           KIM GARRISON  78y  MRN: [id number]    Patient is a 78y old  Female who presents with a chief complaint of Dizziness (27 Nov 2023 06:14)      Interval/Overnight Events: no events ON.     Subjective: Pt seen and examined at bedside. Denies fever, CP, SOB, abn pain, N/V, dysuria. Tolerating diet.      MEDICATIONS  (STANDING):  cholecalciferol 1000 Unit(s) Oral daily  enoxaparin Injectable 40 milliGRAM(s) SubCutaneous every 24 hours  influenza  Vaccine (HIGH DOSE) 0.7 milliLiter(s) IntraMuscular once  lidocaine   4% Patch 1 Patch Transdermal daily  polyethylene glycol 3350 17 Gram(s) Oral daily  senna 1 Tablet(s) Oral at bedtime    MEDICATIONS  (PRN):  acetaminophen     Tablet .. 650 milliGRAM(s) Oral every 6 hours PRN Mild Pain (1 - 3)  meclizine 25 milliGRAM(s) Oral every 8 hours PRN Dizziness  melatonin 3 milliGRAM(s) Oral at bedtime PRN Insomnia  oxyCODONE    IR 2.5 milliGRAM(s) Oral every 6 hours PRN Moderate Pain (4 - 6)  oxyCODONE    IR 5 milliGRAM(s) Oral every 6 hours PRN Severe Pain (7 - 10)      Objective:    Vitals: Vital Signs Last 24 Hrs  T(C): 36.6 (11-28-23 @ 05:36), Max: 36.8 (11-27-23 @ 19:12)  T(F): 97.8 (11-28-23 @ 05:36), Max: 98.3 (11-27-23 @ 19:12)  HR: 71 (11-28-23 @ 05:36) (61 - 84)  BP: 126/47 (11-28-23 @ 05:36) (114/62 - 150/55)  BP(mean): --  RR: 18 (11-28-23 @ 05:36) (18 - 18)  SpO2: 96% (11-28-23 @ 05:36) (94% - 99%)                I&O's Summary      PHYSICAL EXAM:  General: Patient in NAD  HEENT: NCAT, PERRL, no oral lesions  CV: S1+S2, no m/r/g appreciated, tenderness of L midaxillary    Lungs: No respiratory distress, CTAB  Abd: Soft, nontender, no guarding, no rebound tenderness, + bowel sounds   : No suprapubic tenderness  Neuro: Alert and oriented to time, place and person. No focal deficits noted.   Ext: No cyanosis, no edema  Skin: No rash, no phlebitis      LABS:                        12.5   8.48  )-----------( 347      ( 27 Nov 2023 06:41 )             38.0                         12.5   9.19  )-----------( 328      ( 26 Nov 2023 05:00 )             38.9                         12.6   9.75  )-----------( 344      ( 25 Nov 2023 06:35 )             38.7     11-27    140  |  105  |  12  ----------------------------<  94  4.2   |  24  |  0.56  11-26    140  |  107  |  16  ----------------------------<  89  4.1   |  24  |  0.56  11-25    142  |  107  |  18  ----------------------------<  89  4.0   |  24  |  0.58    Ca    8.8      27 Nov 2023 06:41  Ca    8.6      26 Nov 2023 05:00  Ca    8.8      25 Nov 2023 06:35  Phos  2.6     11-27  Mg     2.50     11-27      CAPILLARY BLOOD GLUCOSE            Urinalysis Basic - ( 27 Nov 2023 06:41 )    Color: x / Appearance: x / SG: x / pH: x  Gluc: 94 mg/dL / Ketone: x  / Bili: x / Urobili: x   Blood: x / Protein: x / Nitrite: x   Leuk Esterase: x / RBC: x / WBC x   Sq Epi: x / Non Sq Epi: x / Bacteria: x          RADIOLOGY & ADDITIONAL TESTS:    Imaging Personally Reviewed:  [x ] YES  [ ] NO    Consultants involved in case:   Consultant(s) Notes Reviewed:  [ x] YES  [ ] NO:   Care Discussed with Consultants/Other Providers [x ] YES  [ ] NO           KIM GARRISON  78y  MRN: [id number]    Patient is a 78y old  Female who presents with a chief complaint of Dizziness (27 Nov 2023 06:14)      Interval/Overnight Events: no events ON.     Subjective: Pt seen and examined at bedside. Denies fever, CP, SOB, abn pain, N/V, dysuria. Tolerating diet.      MEDICATIONS  (STANDING):  cholecalciferol 1000 Unit(s) Oral daily  enoxaparin Injectable 40 milliGRAM(s) SubCutaneous every 24 hours  influenza  Vaccine (HIGH DOSE) 0.7 milliLiter(s) IntraMuscular once  lidocaine   4% Patch 1 Patch Transdermal daily  polyethylene glycol 3350 17 Gram(s) Oral daily  senna 1 Tablet(s) Oral at bedtime    MEDICATIONS  (PRN):  acetaminophen     Tablet .. 650 milliGRAM(s) Oral every 6 hours PRN Mild Pain (1 - 3)  meclizine 25 milliGRAM(s) Oral every 8 hours PRN Dizziness  melatonin 3 milliGRAM(s) Oral at bedtime PRN Insomnia  oxyCODONE    IR 2.5 milliGRAM(s) Oral every 6 hours PRN Moderate Pain (4 - 6)  oxyCODONE    IR 5 milliGRAM(s) Oral every 6 hours PRN Severe Pain (7 - 10)      Objective:    Vitals: Vital Signs Last 24 Hrs  T(C): 36.6 (11-28-23 @ 05:36), Max: 36.8 (11-27-23 @ 19:12)  T(F): 97.8 (11-28-23 @ 05:36), Max: 98.3 (11-27-23 @ 19:12)  HR: 71 (11-28-23 @ 05:36) (61 - 84)  BP: 126/47 (11-28-23 @ 05:36) (114/62 - 150/55)  BP(mean): --  RR: 18 (11-28-23 @ 05:36) (18 - 18)  SpO2: 96% (11-28-23 @ 05:36) (94% - 99%)                I&O's Summary      PHYSICAL EXAM:  General: Patient in NAD  HEENT: NCAT, PERRL, no oral lesions  CV: S1+S2, no m/r/g appreciated, tenderness of L midaxillary    Lungs: No respiratory distress, CTAB  Abd: Soft, nontender, no guarding, no rebound tenderness, + bowel sounds   : No suprapubic tenderness  Neuro: Alert and oriented to time, place and person. No focal deficits noted.   Ext: No cyanosis, no edema  Skin: No rash, no phlebitis      LABS:                        12.5   8.48  )-----------( 347      ( 27 Nov 2023 06:41 )             38.0                         12.5   9.19  )-----------( 328      ( 26 Nov 2023 05:00 )             38.9                         12.6   9.75  )-----------( 344      ( 25 Nov 2023 06:35 )             38.7     11-27    140  |  105  |  12  ----------------------------<  94  4.2   |  24  |  0.56  11-26    140  |  107  |  16  ----------------------------<  89  4.1   |  24  |  0.56  11-25    142  |  107  |  18  ----------------------------<  89  4.0   |  24  |  0.58    Ca    8.8      27 Nov 2023 06:41  Ca    8.6      26 Nov 2023 05:00  Ca    8.8      25 Nov 2023 06:35  Phos  2.6     11-27  Mg     2.50     11-27      CAPILLARY BLOOD GLUCOSE            Urinalysis Basic - ( 27 Nov 2023 06:41 )    Color: x / Appearance: x / SG: x / pH: x  Gluc: 94 mg/dL / Ketone: x  / Bili: x / Urobili: x   Blood: x / Protein: x / Nitrite: x   Leuk Esterase: x / RBC: x / WBC x   Sq Epi: x / Non Sq Epi: x / Bacteria: x          RADIOLOGY & ADDITIONAL TESTS:    Imaging Personally Reviewed:  [x ] YES  [ ] NO    Consultants involved in case:   Consultant(s) Notes Reviewed:  [ x] YES  [ ] NO:   Care Discussed with Consultants/Other Providers [x ] YES  [ ] NO           KIM GARRISON  78y  MRN: [id number]    Patient is a 78y old  Female who presents with a chief complaint of Dizziness (27 Nov 2023 06:14)      Interval/Overnight Events: no events ON.     Subjective: Pt seen and examined at bedside. Denies fever, CP, SOB, abn pain, N/V, dysuria. Tolerating diet. No headaches, visual disturbances or focal deficits    MEDICATIONS  (STANDING):  cholecalciferol 1000 Unit(s) Oral daily  enoxaparin Injectable 40 milliGRAM(s) SubCutaneous every 24 hours  influenza  Vaccine (HIGH DOSE) 0.7 milliLiter(s) IntraMuscular once  lidocaine   4% Patch 1 Patch Transdermal daily  polyethylene glycol 3350 17 Gram(s) Oral daily  senna 1 Tablet(s) Oral at bedtime    MEDICATIONS  (PRN):  acetaminophen     Tablet .. 650 milliGRAM(s) Oral every 6 hours PRN Mild Pain (1 - 3)  meclizine 25 milliGRAM(s) Oral every 8 hours PRN Dizziness  melatonin 3 milliGRAM(s) Oral at bedtime PRN Insomnia  oxyCODONE    IR 2.5 milliGRAM(s) Oral every 6 hours PRN Moderate Pain (4 - 6)  oxyCODONE    IR 5 milliGRAM(s) Oral every 6 hours PRN Severe Pain (7 - 10)      Objective:    Vitals: Vital Signs Last 24 Hrs  T(C): 36.6 (11-28-23 @ 05:36), Max: 36.8 (11-27-23 @ 19:12)  T(F): 97.8 (11-28-23 @ 05:36), Max: 98.3 (11-27-23 @ 19:12)  HR: 71 (11-28-23 @ 05:36) (61 - 84)  BP: 126/47 (11-28-23 @ 05:36) (114/62 - 150/55)  BP(mean): --  RR: 18 (11-28-23 @ 05:36) (18 - 18)  SpO2: 96% (11-28-23 @ 05:36) (94% - 99%)                I&O's Summary      PHYSICAL EXAM:  General: Patient in NAD  HEENT: NCAT, PERRL, no oral lesions  CV: S1+S2, no m/r/g appreciated, tenderness of L midaxillary    Lungs: No respiratory distress, CTAB  Abd: Soft, nontender, no guarding, no rebound tenderness, + bowel sounds   : No suprapubic tenderness  Neuro: Alert and oriented to time, place and person. No focal deficits noted.   Ext: No cyanosis, no edema  Skin: No rash, no phlebitis      LABS:                        12.5   8.48  )-----------( 347      ( 27 Nov 2023 06:41 )             38.0                         12.5   9.19  )-----------( 328      ( 26 Nov 2023 05:00 )             38.9                         12.6   9.75  )-----------( 344      ( 25 Nov 2023 06:35 )             38.7     11-27    140  |  105  |  12  ----------------------------<  94  4.2   |  24  |  0.56  11-26    140  |  107  |  16  ----------------------------<  89  4.1   |  24  |  0.56  11-25    142  |  107  |  18  ----------------------------<  89  4.0   |  24  |  0.58    Ca    8.8      27 Nov 2023 06:41  Ca    8.6      26 Nov 2023 05:00  Ca    8.8      25 Nov 2023 06:35  Phos  2.6     11-27  Mg     2.50     11-27      CAPILLARY BLOOD GLUCOSE            Urinalysis Basic - ( 27 Nov 2023 06:41 )    Color: x / Appearance: x / SG: x / pH: x  Gluc: 94 mg/dL / Ketone: x  / Bili: x / Urobili: x   Blood: x / Protein: x / Nitrite: x   Leuk Esterase: x / RBC: x / WBC x   Sq Epi: x / Non Sq Epi: x / Bacteria: x          RADIOLOGY & ADDITIONAL TESTS:    Imaging Personally Reviewed:  [x ] YES  [ ] NO    Consultants involved in case:   Consultant(s) Notes Reviewed:  [ x] YES  [ ] NO:   Care Discussed with Consultants/Other Providers [x ] YES  [ ] NO           KIM GARRISON  78y  MRN: [id number]    Patient is a 78y old  Female who presents with a chief complaint of Dizziness (27 Nov 2023 06:14)      Interval/Overnight Events: no events ON.     Subjective: Pt seen and examined at bedside. Denies fever, CP, SOB, abn pain, N/V, dysuria. Tolerating diet. No headaches, visual disturbances or focal deficits    MEDICATIONS  (STANDING):  cholecalciferol 1000 Unit(s) Oral daily  enoxaparin Injectable 40 milliGRAM(s) SubCutaneous every 24 hours  influenza  Vaccine (HIGH DOSE) 0.7 milliLiter(s) IntraMuscular once  lidocaine   4% Patch 1 Patch Transdermal daily  polyethylene glycol 3350 17 Gram(s) Oral daily  senna 1 Tablet(s) Oral at bedtime    MEDICATIONS  (PRN):  acetaminophen     Tablet .. 650 milliGRAM(s) Oral every 6 hours PRN Mild Pain (1 - 3)  meclizine 25 milliGRAM(s) Oral every 8 hours PRN Dizziness  melatonin 3 milliGRAM(s) Oral at bedtime PRN Insomnia  oxyCODONE    IR 2.5 milliGRAM(s) Oral every 6 hours PRN Moderate Pain (4 - 6)  oxyCODONE    IR 5 milliGRAM(s) Oral every 6 hours PRN Severe Pain (7 - 10)      Objective:    Vitals: Vital Signs Last 24 Hrs  T(C): 36.6 (11-28-23 @ 05:36), Max: 36.8 (11-27-23 @ 19:12)  T(F): 97.8 (11-28-23 @ 05:36), Max: 98.3 (11-27-23 @ 19:12)  HR: 71 (11-28-23 @ 05:36) (61 - 84)  BP: 126/47 (11-28-23 @ 05:36) (114/62 - 150/55)  BP(mean): --  RR: 18 (11-28-23 @ 05:36) (18 - 18)  SpO2: 96% (11-28-23 @ 05:36) (94% - 99%)                I&O's Summary      PHYSICAL EXAM:  General: Patient in NAD  HEENT: NCAT, PERRL, no oral lesions  CV: S1+S2, no m/r/g appreciated, tenderness of L midaxillary    Lungs: No respiratory distress, CTAB  Abd: Soft, nontender, no guarding, no rebound tenderness, + bowel sounds   : No suprapubic tenderness  Neuro: Alert and oriented to time, place and person. No focal deficits noted.   Ext: No cyanosis, no edema  Skin: No rash, no phlebitis      LABS:                        12.5   8.48  )-----------( 347      ( 27 Nov 2023 06:41 )             38.0                         12.5   9.19  )-----------( 328      ( 26 Nov 2023 05:00 )             38.9                         12.6   9.75  )-----------( 344      ( 25 Nov 2023 06:35 )             38.7     11-27    140  |  105  |  12  ----------------------------<  94  4.2   |  24  |  0.56  11-26    140  |  107  |  16  ----------------------------<  89  4.1   |  24  |  0.56  11-25    142  |  107  |  18  ----------------------------<  89  4.0   |  24  |  0.58    Ca    8.8      27 Nov 2023 06:41  Ca    8.6      26 Nov 2023 05:00  Ca    8.8      25 Nov 2023 06:35  Phos  2.6     11-27  Mg     2.50     11-27      CAPILLARY BLOOD GLUCOSE            Urinalysis Basic - ( 27 Nov 2023 06:41 )    Color: x / Appearance: x / SG: x / pH: x  Gluc: 94 mg/dL / Ketone: x  / Bili: x / Urobili: x   Blood: x / Protein: x / Nitrite: x   Leuk Esterase: x / RBC: x / WBC x   Sq Epi: x / Non Sq Epi: x / Bacteria: x          RADIOLOGY & ADDITIONAL TESTS:    Imaging Personally Reviewed:  [x ] YES  [ ] NO    Consultants involved in case:   Consultant(s) Notes Reviewed:  [ x] YES  [ ] NO:   Care Discussed with Consultants/Other Providers [x ] YES  [ ] NO           KIM GARRISON  78y  MRN: [id number]    Patient is a 78y old  Female who presents with a chief complaint of Dizziness (27 Nov 2023 06:14)      Interval/Overnight Events: no events ON.     Subjective: Pt seen and examined at bedside. Denies fever, CP, SOB, abn pain, N/V, dysuria. Tolerating diet. No headaches, visual disturbances or focal deficits    MEDICATIONS  (STANDING):  cholecalciferol 1000 Unit(s) Oral daily  enoxaparin Injectable 40 milliGRAM(s) SubCutaneous every 24 hours  influenza  Vaccine (HIGH DOSE) 0.7 milliLiter(s) IntraMuscular once  lidocaine   4% Patch 1 Patch Transdermal daily  polyethylene glycol 3350 17 Gram(s) Oral daily  senna 1 Tablet(s) Oral at bedtime    MEDICATIONS  (PRN):  acetaminophen     Tablet .. 650 milliGRAM(s) Oral every 6 hours PRN Mild Pain (1 - 3)  meclizine 25 milliGRAM(s) Oral every 8 hours PRN Dizziness  melatonin 3 milliGRAM(s) Oral at bedtime PRN Insomnia  oxyCODONE    IR 2.5 milliGRAM(s) Oral every 6 hours PRN Moderate Pain (4 - 6)  oxyCODONE    IR 5 milliGRAM(s) Oral every 6 hours PRN Severe Pain (7 - 10)      Objective:    Vitals: Vital Signs Last 24 Hrs  T(C): 36.6 (11-28-23 @ 05:36), Max: 36.8 (11-27-23 @ 19:12)  T(F): 97.8 (11-28-23 @ 05:36), Max: 98.3 (11-27-23 @ 19:12)  HR: 71 (11-28-23 @ 05:36) (61 - 84)  BP: 126/47 (11-28-23 @ 05:36) (114/62 - 150/55)  BP(mean): --  RR: 18 (11-28-23 @ 05:36) (18 - 18)  SpO2: 96% (11-28-23 @ 05:36) (94% - 99%)                I&O's Summary      PHYSICAL EXAM:  General: Patient in NAD  HEENT: NCAT, PERRL, no oral lesions  CV: S1+S2, no m/r/g appreciated, tenderness of L midaxillary    Lungs: No respiratory distress, CTAB  Abd: Soft, nontender, no guarding, no rebound tenderness, + bowel sounds   : No suprapubic tenderness  Neuro: Alert and oriented to time, place and person. No focal deficits noted.   Ext: No cyanosis, no edema  Skin: No rash, no phlebitis      LABS:                        12.5   8.48  )-----------( 347      ( 27 Nov 2023 06:41 )             38.0                         12.5   9.19  )-----------( 328      ( 26 Nov 2023 05:00 )             38.9                         12.6   9.75  )-----------( 344      ( 25 Nov 2023 06:35 )             38.7     11-27    140  |  105  |  12  ----------------------------<  94  4.2   |  24  |  0.56  11-26    140  |  107  |  16  ----------------------------<  89  4.1   |  24  |  0.56  11-25    142  |  107  |  18  ----------------------------<  89  4.0   |  24  |  0.58    Ca    8.8      27 Nov 2023 06:41  Ca    8.6      26 Nov 2023 05:00  Ca    8.8      25 Nov 2023 06:35  Phos  2.6     11-27  Mg     2.50     11-27      CAPILLARY BLOOD GLUCOSE            Urinalysis Basic - ( 27 Nov 2023 06:41 )    Color: x / Appearance: x / SG: x / pH: x  Gluc: 94 mg/dL / Ketone: x  / Bili: x / Urobili: x   Blood: x / Protein: x / Nitrite: x   Leuk Esterase: x / RBC: x / WBC x   Sq Epi: x / Non Sq Epi: x / Bacteria: x          RADIOLOGY & ADDITIONAL TESTS:    Imaging Personally Reviewed:  [x ] YES  [ ] NO    Consultants involved in case:   Consultant(s) Notes Reviewed:  [ x] YES  [ ] NO:   Care Discussed with Consultants/Other Providers [x ] YES  [ ] NO

## 2023-11-28 NOTE — PROGRESS NOTE ADULT - ATTENDING COMMENTS
Patient seen and examined with team  Agree with the above A/p by Dr Travis  78yF PMHx meningioma coming 1 week s/p unwitnessed fall without prodromal symptoms. Patient has reported increasing dizziness in the last weeks and feels unsafe at home. Admitted to medicine for evaluation after unwitnessed fall.   Patient noted she tripped on her slippers  Pe nad, alert  Vital Signs Last 24 Hrs  T(F): 97.8 (28 Nov 2023 05:36), Max: 98.3 (27 Nov 2023 19:12)  HR: 71 BP: 126/47 RR: 18 SpO2: 96% room air  Lungs cta, cor rrr, abd soft n/t, ext neg e/c/c    rd< from: MR Head w/wo IV Cont (11.27.23 @ 12:02) >  IMPRESSION:  1.  BRAIN:   Meningioma centered at the left cavernous sinus encases the   left internal carotid artery.  Asymmetric left lateral ventricular   dilatation is nonspecific but is associated with asymmetric subependymal   hyperintensity on the long TR images without enhancement, indeterminate   ependymal disease process.  Second small meningioma left posterior   parasagittal vertex.  Small arachnoid cyst left middle cranial fossa  2.  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 Perfusion of its intracranial distribution with anterior   right-to-left collateral circulation via patent anterior communicating   artery and with posterior to anterior collateral circulation via patent   left posterior communicating artery contribute to perfusion of an   attenuated left middle cerebral artery. The anterior cerebral arteries   are closer to symmetric.  3. POSTERIOR CIRCULATION:  Intact.    A/P  # Fall, most likely mechanical.  # Meningioma--> Out patient f/u with neurology  #Left 4-7th rib fractures with associated pain and shallower breaths. CT Chest pertinent for bilateral lower lobe, right middle lobe, and lingular linear atelectasis.  - Thoracic Surgery consulted, no acute interventions at this time. Percocet prn pain  D/c planning to home  Son is HCP Issac called [phone]---> went to . Son and daughter needs to help with plan. Patient lives alone.  d/w CM/ patient/team Patient seen and examined with team  Agree with the above A/p by Dr Travis  78yF PMHx meningioma coming 1 week s/p unwitnessed fall without prodromal symptoms. Patient has reported increasing dizziness in the last weeks and feels unsafe at home. Admitted to medicine for evaluation after unwitnessed fall.   Patient noted she tripped on her slippers  Pe nad, alert  Vital Signs Last 24 Hrs  T(F): 97.8 (28 Nov 2023 05:36), Max: 98.3 (27 Nov 2023 19:12)  HR: 71 BP: 126/47 RR: 18 SpO2: 96% room air  Lungs cta, cor rrr, abd soft n/t, ext neg e/c/c    rd< from: MR Head w/wo IV Cont (11.27.23 @ 12:02) >  IMPRESSION:  1.  BRAIN:   Meningioma centered at the left cavernous sinus encases the   left internal carotid artery.  Asymmetric left lateral ventricular   dilatation is nonspecific but is associated with asymmetric subependymal   hyperintensity on the long TR images without enhancement, indeterminate   ependymal disease process.  Second small meningioma left posterior   parasagittal vertex.  Small arachnoid cyst left middle cranial fossa  2.  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 Perfusion of its intracranial distribution with anterior   right-to-left collateral circulation via patent anterior communicating   artery and with posterior to anterior collateral circulation via patent   left posterior communicating artery contribute to perfusion of an   attenuated left middle cerebral artery. The anterior cerebral arteries   are closer to symmetric.  3. POSTERIOR CIRCULATION:  Intact.    A/P  # Fall, most likely mechanical.  # Meningioma--> Out patient f/u with neurology  #Left 4-7th rib fractures with associated pain and shallower breaths. CT Chest pertinent for bilateral lower lobe, right middle lobe, and lingular linear atelectasis.  - Thoracic Surgery consulted, no acute interventions at this time. Percocet prn pain  D/c planning to home  Son is HCP Issac called [phone]---> went to . Son and daughter needs to help with plan. Patient lives alone.  d/w CM/ patient/team Patient seen and examined with team  Agree with the above A/p by Dr Travis  78yF PMHx meningioma coming 1 week s/p unwitnessed fall without prodromal symptoms. Patient has reported increasing dizziness in the last weeks and feels unsafe at home. Admitted to medicine for evaluation after unwitnessed fall.   Patient noted she tripped on her slippers  Pe nad, alert  Vital Signs Last 24 Hrs  T(F): 97.8 (28 Nov 2023 05:36), Max: 98.3 (27 Nov 2023 19:12)  HR: 71 BP: 126/47 RR: 18 SpO2: 96% room air  Lungs cta, cor rrr, abd soft n/t, ext neg e/c/c    rd< from: MR Head w/wo IV Cont (11.27.23 @ 12:02) >  IMPRESSION:  1.  BRAIN:   Meningioma centered at the left cavernous sinus encases the   left internal carotid artery.  Asymmetric left lateral ventricular   dilatation is nonspecific but is associated with asymmetric subependymal   hyperintensity on the long TR images without enhancement, indeterminate   ependymal disease process.  Second small meningioma left posterior   parasagittal vertex.  Small arachnoid cyst left middle cranial fossa  2.  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 Perfusion of its intracranial distribution with anterior   right-to-left collateral circulation via patent anterior communicating   artery and with posterior to anterior collateral circulation via patent   left posterior communicating artery contribute to perfusion of an   attenuated left middle cerebral artery. The anterior cerebral arteries   are closer to symmetric.  3. POSTERIOR CIRCULATION:  Intact.    A/P  # Fall, most likely mechanical.  # Meningioma--> Out patient f/u with neurology  #Left 4-7th rib fractures with associated pain and shallower breaths. CT Chest pertinent for bilateral lower lobe, right middle lobe, and lingular linear atelectasis.  - Thoracic Surgery consulted, no acute interventions at this time. Percocet prn pain  D/c planning to home  Son is HCP Issac called [phone]---> went to . Son and daughter needs to help with plan. Patient lives alone.  d/w CM/ patient/team

## 2023-11-28 NOTE — DISCHARGE NOTE NURSING/CASE MANAGEMENT/SOCIAL WORK - PATIENT PORTAL LINK FT
You can access the FollowMyHealth Patient Portal offered by [facility] by registering at the following website: [email or web address]. By joining [employer]’s FollowMyHealth portal, you will also be able to view your health information using other applications (apps) compatible with our system. You can access the FollowMyHealth Patient Portal offered by [facility] by registering at the following website: [email or web address]. By joining [employer]’s FollowMyHealth portal, you will also be able to view your health information using other applications (apps) compatible with our system. You can access the FollowMyHealth Patient Portal offered by [facility] by registering at the following website: [email or web address]. By joining [employer]’s FollowMyHealth portal, you will also be able to view your health information using other applications (apps) compatible with our system.

## 2023-11-28 NOTE — DISCHARGE NOTE NURSING/CASE MANAGEMENT/SOCIAL WORK - NSDCPEFALRISK_GEN_ALL_CORE
For information on Fall & Injury Prevention, visit: [email or web address]-[email or web address]-mobility OR  [email or web address]/[email or web address]-tips-[email or web address]-injury OR  https://www.cdc.gov/steadi/patient.html For information on Fall & Injury Prevention, visit: [email or web address]://www.[email or web address]/[email or web address]-[email or web address]-health-[email or web address]-mobility OR  [email or web address]://www.[email or web address]/[email or web address]-prevention-[email or web address]-[email or web address]-avoid-injury OR  https://www.cdc.gov/steadi/patient.html For information on Fall & Injury Prevention, visit: https://www.[facility]/news/fall-prevention-protects-and-maintains-health-and-mobility OR  https://www.[facility]/news/fall-prevention-tips-to-avoid-injury OR  https://www.cdc.gov/steadi/patient.html

## 2023-11-28 NOTE — PROGRESS NOTE ADULT - PROBLEM SELECTOR PLAN 2
Left 4-7th rib fractures with associated pain and shallower breaths. CT Chest pertinent for bilateral lower lobe, right middle lobe, and lingular linear atelectasis.    - Thoracic Surgery consulted, no acute interventions at this time  - Incentive spirometry explained and encouraged  - Pain control: Tylenol q6 PRN for 1-3 pain, Oxycodone IR 2.5 PRN for 4-7 pain, Oxycodone IR 5 for 8-10  - F/u with Dr Danny Person in 3-4 weeks with repeat CXR.  - Lidocaine patch Left 4-7th rib fractures with associated pain and shallower breaths. CT Chest pertinent for bilateral lower lobe, right middle lobe, and lingular linear atelectasis.    - Thoracic Surgery consulted, no acute interventions at this time  - Incentive spirometry explained and encouraged  - Pain control: Tylenol q6 PRN for 1-3 pain, Oxycodone IR 2.5 PRN for 4-7 pain, Oxycodone IR 5 for 8-10  - F/u with Dr Danny Person in 3-4 weeks with repeat CXR.  - Lidocaine patch; pain improving

## 2023-11-28 NOTE — PROGRESS NOTE ADULT - PROBLEM SELECTOR PLAN 1
Per patient story, patient slipped on the ground and fell. Denies dizziness, chest palpitations, or LOC at the time.  Orthostatics OK.  EKG obtained in ED NSR.  TTE EF 59%, overall reassuring  - D/c tele (mechanical fall)  - Pending Home PT at [place] Per patient story, patient slipped on the ground and fell. Denies dizziness, chest palpitations, or LOC at the time.  Orthostatics OK.  EKG obtained in ED NSR.  TTE EF 59%, overall reassuring  - D/c tele (mechanical fall)  - Pending Home PT at [place] Per patient story, patient slipped on the ground and fell. Denies dizziness, chest palpitations, or LOC at the time.  Orthostatics OK.  EKG obtained in ED NSR.  TTE EF 59%, overall reassuring  - D/c tele (mechanical fall)  - Pending Home PT at [place]

## 2023-11-28 NOTE — DISCHARGE NOTE NURSING/CASE MANAGEMENT/SOCIAL WORK - NSSCCONTNUM_GEN_ALL_CORE
S [facility] [phone]  S [facility] [phone]  S [facility] [phone]

## 2023-11-28 NOTE — PROGRESS NOTE ADULT - PROBLEM SELECTOR PLAN 3
MRI shows Asymmetrically diminished flow throughout the intracranial left ICA, with string sign/near occlusion throughout the cavernous, clinoid and supraclinoid levels, with reconstitution in the communicating segment.  Homogeneously enhancing dural based mass measures 2.9 x 2.5 cm, obscures the cavernous sinus, extends into the sella and suprasellar cistern and also appears to involve the proximal left optic nerve sheath via the supraorbital foramen.  MRI brain with angio demonstrates meningioma encircling L internal carotid artery with subtotal occlusion with anterior brain perfusion through alternative circulation  - Patient aware of diagnosis and has proper outpatient follow-up  - Obtain out-patient records in AM in regards to occlusion of ICA  - Neurosurgery consult  - Neurology consult MRI shows Asymmetrically diminished flow throughout the intracranial left ICA, with string sign/near occlusion throughout the cavernous, clinoid and supraclinoid levels, with reconstitution in the communicating segment.  Homogeneously enhancing dural based mass measures 2.9 x 2.5 cm, obscures the cavernous sinus, extends into the sella and suprasellar cistern and also appears to involve the proximal left optic nerve sheath via the supraorbital foramen.  MRI brain with angio demonstrates meningioma encircling L internal carotid artery with subtotal occlusion with anterior brain perfusion through alternative circulation  - Patient aware of diagnosis and has proper outpatient follow-up  - Obtain out-patient records in AM in regards to occlusion of ICA  - Neurosurgery consult  - Neurology consult  - s/p MRI; f.u w/ neurosurg about outpatient follow up

## 2023-11-29 VITALS
TEMPERATURE: 98 F | OXYGEN SATURATION: 97 % | HEART RATE: 75 BPM | SYSTOLIC BLOOD PRESSURE: 120 MMHG | RESPIRATION RATE: 17 BRPM | DIASTOLIC BLOOD PRESSURE: 67 MMHG

## 2023-11-29 PROCEDURE — 99232 SBSQ HOSP IP/OBS MODERATE 35: CPT | Mod: GC

## 2023-11-29 RX ADMIN — ENOXAPARIN SODIUM 40 MILLIGRAM(S): 100 INJECTION SUBCUTANEOUS at 09:40

## 2023-11-29 RX ADMIN — LIDOCAINE 1 PATCH: 4 CREAM TOPICAL at 11:43

## 2023-11-29 RX ADMIN — LIDOCAINE 1 PATCH: 4 CREAM TOPICAL at 01:30

## 2023-11-29 RX ADMIN — Medication 1000 UNIT(S): at 11:44

## 2023-11-29 RX ADMIN — Medication 650 MILLIGRAM(S): at 10:10

## 2023-11-29 RX ADMIN — Medication 650 MILLIGRAM(S): at 09:40

## 2023-11-29 NOTE — PROGRESS NOTE ADULT - PROBLEM SELECTOR PLAN 1
Per patient story, patient slipped on the ground and fell. Denies dizziness, chest palpitations, or LOC at the time.  Orthostatics OK.  EKG obtained in ED NSR.  TTE EF 59%, overall reassuring  - D/c tele (mechanical fall)  - Pending Home PT at [place] Per patient story, patient slipped on the ground and fell. Denies dizziness, chest palpitations, or LOC at the time.  Orthostatics OK.  EKG obtained in ED NSR.  TTE EF 59%, overall reassuring  - D/c tele (mechanical fall)  - Pending Home PT at [place] Per patient story, patient slipped on the ground and fell. Denies dizziness, chest palpitations, or LOC at the time.  Orthostatics OK.  EKG obtained in ED NSR.  TTE EF 59%, overall reassuring  - D/c tele (mechanical fall)  - Pending Home PT at [place]

## 2023-11-29 NOTE — PROGRESS NOTE ADULT - ATTENDING COMMENTS
Patient seen and examined with team  Agree with the above A/p by Dr Travis  78yF PMHx meningioma coming 1 week s/p unwitnessed fall without prodromal symptoms. Patient has reported increasing dizziness in the last weeks and feels unsafe at home. Admitted to medicine for evaluation after unwitnessed fall.   Patient noted she tripped on her slippers  Pe nad, alert  Patient seen walking around room with son Issac present  T(C): 36.7 (29 Nov 2023 13:00), Max: 36.7 (28 Nov 2023 14:02)  T(F): 98.1 HR: 75 BP: 120/67 RR: 17 SpO2: 97% room air  Lungs cta, cor rrr, abd soft n/t, ext neg e/c/c    rd< from: MR Head w/wo IV Cont (11.27.23 @ 12:02) >  IMPRESSION:  1.  BRAIN:   Meningioma centered at the left cavernous sinus encases the   left internal carotid artery.  Asymmetric left lateral ventricular   dilatation is nonspecific but is associated with asymmetric subependymal   hyperintensity on the long TR images without enhancement, indeterminate   ependymal disease process.  Second small meningioma left posterior   parasagittal vertex.  Small arachnoid cyst left middle cranial fossa  2.  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 Perfusion of its intracranial distribution with anterior   right-to-left collateral circulation via patent anterior communicating   artery and with posterior to anterior collateral circulation via patent   left posterior communicating artery contribute to perfusion of an   attenuated left middle cerebral artery. The anterior cerebral arteries   are closer to symmetric.  3. POSTERIOR CIRCULATION:  Intact.    A/P  # Fall, most likely mechanical.  # Meningioma--> Out patient f/u with neurology  #Left 4-7th rib fractures with associated pain and shallower breaths. CT Chest pertinent for bilateral lower lobe, right middle lobe, and lingular linear atelectasis.  - Thoracic Surgery consulted, no acute interventions at this time. Percocet prn pain  D/c planning to home  Son is HCP Issac [phone]---> PRESENT AT BEDSIDE . CM arranging for f/u at home  d/w CM/ patient/team/CM  60 min to coord d/c to home Patient seen and examined with team  Agree with the above A/p by Dr Travis  78yF PMHx meningioma coming 1 week s/p unwitnessed fall without prodromal symptoms. Patient has reported increasing dizziness in the last weeks and feels unsafe at home. Admitted to medicine for evaluation after unwitnessed fall.   Patient noted she tripped on her slippers  Pe nad, alert  Patient seen walking around room with son Issac present  T(C): 36.7 (29 Nov 2023 13:00), Max: 36.7 (28 Nov 2023 14:02)  T(F): 98.1 HR: 75 BP: 120/67 RR: 17 SpO2: 97% room air  Lungs cta, cor rrr, abd soft n/t, ext neg e/c/c    rd< from: MR Head w/wo IV Cont (11.27.23 @ 12:02) >  IMPRESSION:  1.  BRAIN:   Meningioma centered at the left cavernous sinus encases the   left internal carotid artery.  Asymmetric left lateral ventricular   dilatation is nonspecific but is associated with asymmetric subependymal   hyperintensity on the long TR images without enhancement, indeterminate   ependymal disease process.  Second small meningioma left posterior   parasagittal vertex.  Small arachnoid cyst left middle cranial fossa  2.  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 Perfusion of its intracranial distribution with anterior   right-to-left collateral circulation via patent anterior communicating   artery and with posterior to anterior collateral circulation via patent   left posterior communicating artery contribute to perfusion of an   attenuated left middle cerebral artery. The anterior cerebral arteries   are closer to symmetric.  3. POSTERIOR CIRCULATION:  Intact.    A/P  # Fall, most likely mechanical.  # Meningioma--> Out patient f/u with neurology  #Left 4-7th rib fractures with associated pain and shallower breaths. CT Chest pertinent for bilateral lower lobe, right middle lobe, and lingular linear atelectasis.  - Thoracic Surgery consulted, no acute interventions at this time. Percocet prn pain  D/c planning to home  Son is HCP Issac [phone]---> PRESENT AT BEDSIDE . CM arranging for f/u at home  d/w CM/ patient/team/CM  60 min to coord d/c to home Patient seen and examined with team  Agree with the above A/p by Dr Travis  78yF PMHx meningioma coming 1 week s/p unwitnessed fall without prodromal symptoms. Patient has reported increasing dizziness in the last weeks and feels unsafe at home. Admitted to medicine for evaluation after unwitnessed fall.   Patient noted she tripped on her slippers  Pe nad, alert  Patient seen walking around room with son Issac present  T(C): 36.7 (29 Nov 2023 13:00), Max: 36.7 (28 Nov 2023 14:02)  T(F): 98.1 HR: 75 BP: 120/67 RR: 17 SpO2: 97% room air  Lungs cta, cor rrr, abd soft n/t, ext neg e/c/c    rd< from: MR Head w/wo IV Cont (11.27.23 @ 12:02) >  IMPRESSION:  1.  BRAIN:   Meningioma centered at the left cavernous sinus encases the   left internal carotid artery.  Asymmetric left lateral ventricular   dilatation is nonspecific but is associated with asymmetric subependymal   hyperintensity on the long TR images without enhancement, indeterminate   ependymal disease process.  Second small meningioma left posterior   parasagittal vertex.  Small arachnoid cyst left middle cranial fossa  2.  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 Perfusion of its intracranial distribution with anterior   right-to-left collateral circulation via patent anterior communicating   artery and with posterior to anterior collateral circulation via patent   left posterior communicating artery contribute to perfusion of an   attenuated left middle cerebral artery. The anterior cerebral arteries   are closer to symmetric.  3. POSTERIOR CIRCULATION:  Intact.    A/P  # Fall, most likely mechanical.  # Meningioma--> Out patient f/u with neurology  #Left 4-7th rib fractures with associated pain and shallower breaths. CT Chest pertinent for bilateral lower lobe, right middle lobe, and lingular linear atelectasis.  - Thoracic Surgery consulted, no acute interventions at this time. Percocet prn pain  D/c planning to home  Son is HCP Issac [phone]---> PRESENT AT BEDSIDE . CM arranging for f/u at home  d/w CM/ patient/team/CM  60 min to coord d/c to home

## 2023-11-29 NOTE — PROGRESS NOTE ADULT - SUBJECTIVE AND OBJECTIVE BOX
Progress Note  Authored by:   Gorge Corona MD PGY-1 Internal Medicine    11-23-23 (6d)    Patient is a 78y old  Female who presents with a chief complaint of Dizziness (29 Nov 2023 07:25)      Subjective / Overnight Events :  - No acute events overnight.  - Pt seen and examined at bedside. Slept well. No focal neurologic deficits. has a mild headache this AM no blurry vision or confusion this AM.    Additional ROS (if any):    MEDICATIONS  (STANDING):  cholecalciferol 1000 Unit(s) Oral daily  enoxaparin Injectable 40 milliGRAM(s) SubCutaneous every 24 hours  influenza  Vaccine (HIGH DOSE) 0.7 milliLiter(s) IntraMuscular once  lidocaine   4% Patch 1 Patch Transdermal daily  polyethylene glycol 3350 17 Gram(s) Oral daily  senna 1 Tablet(s) Oral at bedtime    MEDICATIONS  (PRN):  acetaminophen     Tablet .. 650 milliGRAM(s) Oral every 6 hours PRN Mild Pain (1 - 3)  meclizine 25 milliGRAM(s) Oral every 8 hours PRN Dizziness  melatonin 3 milliGRAM(s) Oral at bedtime PRN Insomnia  oxyCODONE    IR 2.5 milliGRAM(s) Oral every 6 hours PRN Moderate Pain (4 - 6)  oxyCODONE    IR 5 milliGRAM(s) Oral every 6 hours PRN Severe Pain (7 - 10)          PHYSICAL EXAM:  Vital Signs Last 24 Hrs  T(C): 36.6 (29 Nov 2023 05:32), Max: 36.7 (28 Nov 2023 14:02)  T(F): 97.8 (29 Nov 2023 05:32), Max: 98.1 (28 Nov 2023 14:02)  HR: 80 (29 Nov 2023 05:32) (71 - 88)  BP: 126/60 (29 Nov 2023 05:32) (126/60 - 134/62)  BP(mean): --  RR: 17 (29 Nov 2023 05:32) (17 - 18)  SpO2: 97% (29 Nov 2023 05:32) (96% - 100%)    Parameters below as of 29 Nov 2023 05:32  Patient On (Oxygen Delivery Method): room air        I&O's Summary      General: NAD, non-toxic appearing   HEENT: EOMi, no scleral icterus, no anterior/posterior cervical lymphadenopathy  CV: RRR, normal S1 and S2, no m/r/g  Lungs: normal respiratory effort. CTAB, no wheezes, rales, or rhonchi  Abd: soft, nontender, nondistended  Ext: no edema, 2+ peripheral pulses   Pysch: AAOx4, appropriate affect   Neuro: grossly non-focal, moving all extremities spontaneously, mild headache, mild tenderness L chest    Skin: no rashes or lesions     LABS:  CAPILLARY BLOOD GLUCOSE                WBC Trend: 8.48<--, 9.19<--, 9.75<--  Hb Trend: 12.5<--, 12.5<--, 12.6<--, 11.8<--, 12.5<--                        RADIOLOGY & ADDITIONAL TESTS: Reviewed

## 2023-11-29 NOTE — PROGRESS NOTE ADULT - PROVIDER SPECIALTY LIST ADULT
Internal Medicine
Internal Medicine
Thoracic Surgery
Internal Medicine

## 2023-11-29 NOTE — PROGRESS NOTE ADULT - PROBLEM SELECTOR PLAN 3
MRI shows Asymmetrically diminished flow throughout the intracranial left ICA, with string sign/near occlusion throughout the cavernous, clinoid and supraclinoid levels, with reconstitution in the communicating segment.  Homogeneously enhancing dural based mass measures 2.9 x 2.5 cm, obscures the cavernous sinus, extends into the sella and suprasellar cistern and also appears to involve the proximal left optic nerve sheath via the supraorbital foramen.  MRI brain with angio demonstrates meningioma encircling L internal carotid artery with subtotal occlusion with anterior brain perfusion through alternative circulation  - Patient aware of diagnosis and has proper outpatient follow-up  - Obtain out-patient records in AM in regards to occlusion of ICA  - Neurosurgery consult  - Neurology consult  - s/p MRI; f.u w/ neurosurg about outpatient follow up MRI shows Asymmetrically diminished flow throughout the intracranial left ICA, with string sign/near occlusion throughout the cavernous, clinoid and supraclinoid levels, with reconstitution in the communicating segment.  Homogeneously enhancing dural based mass measures 2.9 x 2.5 cm, obscures the cavernous sinus, extends into the sella and suprasellar cistern and also appears to involve the proximal left optic nerve sheath via the supraorbital foramen.  MRI brain with angio demonstrates meningioma encircling L internal carotid artery with subtotal occlusion with anterior brain perfusion through alternative circulation  - Patient aware of diagnosis and has proper outpatient follow-up  - Obtain out-patient records in AM in regards to occlusion of ICA  - Neurosurgery consult  - Neurology consult  - s/p MRI; f.u w/ neurosurg about outpatient follow up  - Mild Headache today, Tylenol given. No alarm symptoms such as diplopia, blurry vision, or neurologic deficits. Vitals stable.

## 2023-11-29 NOTE — PROGRESS NOTE ADULT - PROBLEM SELECTOR PLAN 2
Left 4-7th rib fractures with associated pain and shallower breaths. CT Chest pertinent for bilateral lower lobe, right middle lobe, and lingular linear atelectasis.    - Thoracic Surgery consulted, no acute interventions at this time  - Incentive spirometry explained and encouraged  - Pain control: Tylenol q6 PRN for 1-3 pain, Oxycodone IR 2.5 PRN for 4-7 pain, Oxycodone IR 5 for 8-10  - F/u with Dr Danny Person in 3-4 weeks with repeat CXR.  - Lidocaine patch; pain improving

## 2023-11-30 ENCOUNTER — NON-APPOINTMENT (OUTPATIENT)
Age: 78
End: 2023-11-30

## 2023-12-05 ENCOUNTER — APPOINTMENT (OUTPATIENT)
Dept: FAMILY MEDICINE | Facility: CLINIC | Age: 78
End: 2023-12-05
Payer: MEDICARE

## 2023-12-05 VITALS
RESPIRATION RATE: 15 BRPM | HEART RATE: 79 BPM | DIASTOLIC BLOOD PRESSURE: 60 MMHG | OXYGEN SATURATION: 97 % | SYSTOLIC BLOOD PRESSURE: 100 MMHG | WEIGHT: 113 LBS | BODY MASS INDEX: 21.34 KG/M2 | TEMPERATURE: 97 F | HEIGHT: 61 IN

## 2023-12-05 DIAGNOSIS — Z09 ENCOUNTER FOR FOLLOW-UP EXAMINATION AFTER COMPLETED TREATMENT FOR CONDITIONS OTHER THAN MALIGNANT NEOPLASM: ICD-10-CM

## 2023-12-05 PROCEDURE — 99496 TRANSJ CARE MGMT HIGH F2F 7D: CPT | Mod: 25

## 2023-12-05 RX ORDER — OMEPRAZOLE 40 MG/1
40 CAPSULE, DELAYED RELEASE ORAL
Qty: 30 | Refills: 0 | Status: DISCONTINUED | COMMUNITY
Start: 2022-12-13 | End: 2023-12-05

## 2023-12-05 RX ORDER — LORATADINE 10 MG/1
10 TABLET ORAL
Qty: 30 | Refills: 3 | Status: DISCONTINUED | COMMUNITY
Start: 2022-09-06 | End: 2023-12-05

## 2023-12-05 RX ORDER — CLOTRIMAZOLE AND BETAMETHASONE DIPROPIONATE 10; .5 MG/G; MG/G
1-0.05 CREAM TOPICAL TWICE DAILY
Qty: 1 | Refills: 1 | Status: DISCONTINUED | COMMUNITY
Start: 2021-12-07 | End: 2023-12-05

## 2023-12-05 RX ORDER — CHOLECALCIFEROL (VITAMIN D3) 25 MCG
TABLET ORAL
Refills: 0 | Status: ACTIVE | COMMUNITY

## 2023-12-05 RX ORDER — DENOSUMAB 60 MG/ML
60 INJECTION SUBCUTANEOUS
Qty: 1 | Refills: 0 | Status: DISCONTINUED | COMMUNITY
Start: 2021-12-09 | End: 2023-12-05

## 2023-12-05 RX ORDER — MONTELUKAST 10 MG/1
10 TABLET, FILM COATED ORAL
Qty: 1 | Refills: 1 | Status: DISCONTINUED | COMMUNITY
Start: 2022-09-06 | End: 2023-12-05

## 2023-12-05 RX ORDER — BETAMETHASONE DIPROPIONATE 0.5 MG/G
0.05 CREAM, AUGMENTED TOPICAL
Qty: 1 | Refills: 1 | Status: DISCONTINUED | COMMUNITY
Start: 2023-07-20 | End: 2023-12-05

## 2023-12-05 RX ORDER — ALBUTEROL SULFATE 90 UG/1
108 (90 BASE) INHALANT RESPIRATORY (INHALATION)
Qty: 1 | Refills: 1 | Status: DISCONTINUED | COMMUNITY
Start: 2022-09-06 | End: 2023-12-05

## 2023-12-10 ENCOUNTER — EMERGENCY (EMERGENCY)
Facility: HOSPITAL | Age: 78
LOS: 1 days | Discharge: HOME | End: 2023-12-10
Attending: EMERGENCY MEDICINE
Payer: MEDICARE

## 2023-12-10 VITALS
HEART RATE: 78 BPM | RESPIRATION RATE: 18 BRPM | TEMPERATURE: 99 F | HEIGHT: 60 IN | OXYGEN SATURATION: 98 % | WEIGHT: 115.08 LBS | DIASTOLIC BLOOD PRESSURE: 63 MMHG | SYSTOLIC BLOOD PRESSURE: 137 MMHG

## 2023-12-10 VITALS
TEMPERATURE: 98 F | OXYGEN SATURATION: 98 % | SYSTOLIC BLOOD PRESSURE: 129 MMHG | RESPIRATION RATE: 18 BRPM | HEART RATE: 74 BPM | DIASTOLIC BLOOD PRESSURE: 73 MMHG

## 2023-12-10 LAB
ALBUMIN SERPL ELPH-MCNC: 4 G/DL — SIGNIFICANT CHANGE UP (ref 3.3–5)
ALBUMIN SERPL ELPH-MCNC: 4 G/DL — SIGNIFICANT CHANGE UP (ref 3.3–5)
ALP SERPL-CCNC: 146 U/L — HIGH (ref 40–120)
ALP SERPL-CCNC: 146 U/L — HIGH (ref 40–120)
ALT FLD-CCNC: 18 U/L — SIGNIFICANT CHANGE UP (ref 10–45)
ALT FLD-CCNC: 18 U/L — SIGNIFICANT CHANGE UP (ref 10–45)
ANION GAP SERPL CALC-SCNC: 12 MMOL/L — SIGNIFICANT CHANGE UP (ref 5–17)
ANION GAP SERPL CALC-SCNC: 12 MMOL/L — SIGNIFICANT CHANGE UP (ref 5–17)
APPEARANCE UR: CLEAR — SIGNIFICANT CHANGE UP
APPEARANCE UR: CLEAR — SIGNIFICANT CHANGE UP
APTT BLD: 30.8 SEC — SIGNIFICANT CHANGE UP (ref 24.5–35.6)
APTT BLD: 30.8 SEC — SIGNIFICANT CHANGE UP (ref 24.5–35.6)
AST SERPL-CCNC: 19 U/L — SIGNIFICANT CHANGE UP (ref 10–40)
AST SERPL-CCNC: 19 U/L — SIGNIFICANT CHANGE UP (ref 10–40)
BACTERIA # UR AUTO: NEGATIVE /HPF — SIGNIFICANT CHANGE UP
BACTERIA # UR AUTO: NEGATIVE /HPF — SIGNIFICANT CHANGE UP
BASOPHILS # BLD AUTO: 0.06 K/UL — SIGNIFICANT CHANGE UP (ref 0–0.2)
BASOPHILS # BLD AUTO: 0.06 K/UL — SIGNIFICANT CHANGE UP (ref 0–0.2)
BASOPHILS NFR BLD AUTO: 0.6 % — SIGNIFICANT CHANGE UP (ref 0–2)
BASOPHILS NFR BLD AUTO: 0.6 % — SIGNIFICANT CHANGE UP (ref 0–2)
BILIRUB SERPL-MCNC: 0.2 MG/DL — SIGNIFICANT CHANGE UP (ref 0.2–1.2)
BILIRUB SERPL-MCNC: 0.2 MG/DL — SIGNIFICANT CHANGE UP (ref 0.2–1.2)
BILIRUB UR-MCNC: NEGATIVE — SIGNIFICANT CHANGE UP
BILIRUB UR-MCNC: NEGATIVE — SIGNIFICANT CHANGE UP
BUN SERPL-MCNC: 11 MG/DL — SIGNIFICANT CHANGE UP (ref 7–23)
BUN SERPL-MCNC: 11 MG/DL — SIGNIFICANT CHANGE UP (ref 7–23)
CALCIUM SERPL-MCNC: 9.4 MG/DL — SIGNIFICANT CHANGE UP (ref 8.4–10.5)
CALCIUM SERPL-MCNC: 9.4 MG/DL — SIGNIFICANT CHANGE UP (ref 8.4–10.5)
CAST: 0 /LPF — SIGNIFICANT CHANGE UP (ref 0–4)
CAST: 0 /LPF — SIGNIFICANT CHANGE UP (ref 0–4)
CHLORIDE SERPL-SCNC: 103 MMOL/L — SIGNIFICANT CHANGE UP (ref 96–108)
CHLORIDE SERPL-SCNC: 103 MMOL/L — SIGNIFICANT CHANGE UP (ref 96–108)
CO2 SERPL-SCNC: 22 MMOL/L — SIGNIFICANT CHANGE UP (ref 22–31)
CO2 SERPL-SCNC: 22 MMOL/L — SIGNIFICANT CHANGE UP (ref 22–31)
COLOR SPEC: YELLOW — SIGNIFICANT CHANGE UP
COLOR SPEC: YELLOW — SIGNIFICANT CHANGE UP
CREAT SERPL-MCNC: 0.57 MG/DL — SIGNIFICANT CHANGE UP (ref 0.5–1.3)
CREAT SERPL-MCNC: 0.57 MG/DL — SIGNIFICANT CHANGE UP (ref 0.5–1.3)
DIFF PNL FLD: NEGATIVE — SIGNIFICANT CHANGE UP
DIFF PNL FLD: NEGATIVE — SIGNIFICANT CHANGE UP
EGFR: 94 ML/MIN/1.73M2 — SIGNIFICANT CHANGE UP
EGFR: 94 ML/MIN/1.73M2 — SIGNIFICANT CHANGE UP
EOSINOPHIL # BLD AUTO: 0.17 K/UL — SIGNIFICANT CHANGE UP (ref 0–0.5)
EOSINOPHIL # BLD AUTO: 0.17 K/UL — SIGNIFICANT CHANGE UP (ref 0–0.5)
EOSINOPHIL NFR BLD AUTO: 1.7 % — SIGNIFICANT CHANGE UP (ref 0–6)
EOSINOPHIL NFR BLD AUTO: 1.7 % — SIGNIFICANT CHANGE UP (ref 0–6)
GLUCOSE SERPL-MCNC: 88 MG/DL — SIGNIFICANT CHANGE UP (ref 70–99)
GLUCOSE SERPL-MCNC: 88 MG/DL — SIGNIFICANT CHANGE UP (ref 70–99)
GLUCOSE UR QL: NEGATIVE MG/DL — SIGNIFICANT CHANGE UP
GLUCOSE UR QL: NEGATIVE MG/DL — SIGNIFICANT CHANGE UP
HCT VFR BLD CALC: 38.4 % — SIGNIFICANT CHANGE UP (ref 34.5–45)
HCT VFR BLD CALC: 38.4 % — SIGNIFICANT CHANGE UP (ref 34.5–45)
HGB BLD-MCNC: 12.2 G/DL — SIGNIFICANT CHANGE UP (ref 11.5–15.5)
HGB BLD-MCNC: 12.2 G/DL — SIGNIFICANT CHANGE UP (ref 11.5–15.5)
IMM GRANULOCYTES NFR BLD AUTO: 0.4 % — SIGNIFICANT CHANGE UP (ref 0–0.9)
IMM GRANULOCYTES NFR BLD AUTO: 0.4 % — SIGNIFICANT CHANGE UP (ref 0–0.9)
INR BLD: 1.17 RATIO — SIGNIFICANT CHANGE UP (ref 0.85–1.18)
INR BLD: 1.17 RATIO — SIGNIFICANT CHANGE UP (ref 0.85–1.18)
KETONES UR-MCNC: 15 MG/DL
KETONES UR-MCNC: 15 MG/DL
LEUKOCYTE ESTERASE UR-ACNC: NEGATIVE — SIGNIFICANT CHANGE UP
LEUKOCYTE ESTERASE UR-ACNC: NEGATIVE — SIGNIFICANT CHANGE UP
LYMPHOCYTES # BLD AUTO: 2.04 K/UL — SIGNIFICANT CHANGE UP (ref 1–3.3)
LYMPHOCYTES # BLD AUTO: 2.04 K/UL — SIGNIFICANT CHANGE UP (ref 1–3.3)
LYMPHOCYTES # BLD AUTO: 20.2 % — SIGNIFICANT CHANGE UP (ref 13–44)
LYMPHOCYTES # BLD AUTO: 20.2 % — SIGNIFICANT CHANGE UP (ref 13–44)
MCHC RBC-ENTMCNC: 25.8 PG — LOW (ref 27–34)
MCHC RBC-ENTMCNC: 25.8 PG — LOW (ref 27–34)
MCHC RBC-ENTMCNC: 31.8 GM/DL — LOW (ref 32–36)
MCHC RBC-ENTMCNC: 31.8 GM/DL — LOW (ref 32–36)
MCV RBC AUTO: 81.2 FL — SIGNIFICANT CHANGE UP (ref 80–100)
MCV RBC AUTO: 81.2 FL — SIGNIFICANT CHANGE UP (ref 80–100)
MONOCYTES # BLD AUTO: 0.76 K/UL — SIGNIFICANT CHANGE UP (ref 0–0.9)
MONOCYTES # BLD AUTO: 0.76 K/UL — SIGNIFICANT CHANGE UP (ref 0–0.9)
MONOCYTES NFR BLD AUTO: 7.5 % — SIGNIFICANT CHANGE UP (ref 2–14)
MONOCYTES NFR BLD AUTO: 7.5 % — SIGNIFICANT CHANGE UP (ref 2–14)
NEUTROPHILS # BLD AUTO: 7.04 K/UL — SIGNIFICANT CHANGE UP (ref 1.8–7.4)
NEUTROPHILS # BLD AUTO: 7.04 K/UL — SIGNIFICANT CHANGE UP (ref 1.8–7.4)
NEUTROPHILS NFR BLD AUTO: 69.6 % — SIGNIFICANT CHANGE UP (ref 43–77)
NEUTROPHILS NFR BLD AUTO: 69.6 % — SIGNIFICANT CHANGE UP (ref 43–77)
NITRITE UR-MCNC: NEGATIVE — SIGNIFICANT CHANGE UP
NITRITE UR-MCNC: NEGATIVE — SIGNIFICANT CHANGE UP
NRBC # BLD: 0 /100 WBCS — SIGNIFICANT CHANGE UP (ref 0–0)
NRBC # BLD: 0 /100 WBCS — SIGNIFICANT CHANGE UP (ref 0–0)
PH UR: 7 — SIGNIFICANT CHANGE UP (ref 5–8)
PH UR: 7 — SIGNIFICANT CHANGE UP (ref 5–8)
PLATELET # BLD AUTO: 360 K/UL — SIGNIFICANT CHANGE UP (ref 150–400)
PLATELET # BLD AUTO: 360 K/UL — SIGNIFICANT CHANGE UP (ref 150–400)
POTASSIUM SERPL-MCNC: 4.1 MMOL/L — SIGNIFICANT CHANGE UP (ref 3.5–5.3)
POTASSIUM SERPL-MCNC: 4.1 MMOL/L — SIGNIFICANT CHANGE UP (ref 3.5–5.3)
POTASSIUM SERPL-SCNC: 4.1 MMOL/L — SIGNIFICANT CHANGE UP (ref 3.5–5.3)
POTASSIUM SERPL-SCNC: 4.1 MMOL/L — SIGNIFICANT CHANGE UP (ref 3.5–5.3)
PROT SERPL-MCNC: 7.2 G/DL — SIGNIFICANT CHANGE UP (ref 6–8.3)
PROT SERPL-MCNC: 7.2 G/DL — SIGNIFICANT CHANGE UP (ref 6–8.3)
PROT UR-MCNC: NEGATIVE MG/DL — SIGNIFICANT CHANGE UP
PROT UR-MCNC: NEGATIVE MG/DL — SIGNIFICANT CHANGE UP
PROTHROM AB SERPL-ACNC: 12.2 SEC — SIGNIFICANT CHANGE UP (ref 9.5–13)
PROTHROM AB SERPL-ACNC: 12.2 SEC — SIGNIFICANT CHANGE UP (ref 9.5–13)
RBC # BLD: 4.73 M/UL — SIGNIFICANT CHANGE UP (ref 3.8–5.2)
RBC # BLD: 4.73 M/UL — SIGNIFICANT CHANGE UP (ref 3.8–5.2)
RBC # FLD: 14.4 % — SIGNIFICANT CHANGE UP (ref 10.3–14.5)
RBC # FLD: 14.4 % — SIGNIFICANT CHANGE UP (ref 10.3–14.5)
RBC CASTS # UR COMP ASSIST: 0 /HPF — SIGNIFICANT CHANGE UP (ref 0–4)
RBC CASTS # UR COMP ASSIST: 0 /HPF — SIGNIFICANT CHANGE UP (ref 0–4)
SODIUM SERPL-SCNC: 137 MMOL/L — SIGNIFICANT CHANGE UP (ref 135–145)
SODIUM SERPL-SCNC: 137 MMOL/L — SIGNIFICANT CHANGE UP (ref 135–145)
SP GR SPEC: <1.005 — LOW (ref 1–1.03)
SP GR SPEC: <1.005 — LOW (ref 1–1.03)
SQUAMOUS # UR AUTO: 0 /HPF — SIGNIFICANT CHANGE UP (ref 0–5)
SQUAMOUS # UR AUTO: 0 /HPF — SIGNIFICANT CHANGE UP (ref 0–5)
UROBILINOGEN FLD QL: 0.2 MG/DL — SIGNIFICANT CHANGE UP (ref 0.2–1)
UROBILINOGEN FLD QL: 0.2 MG/DL — SIGNIFICANT CHANGE UP (ref 0.2–1)
WBC # BLD: 10.11 K/UL — SIGNIFICANT CHANGE UP (ref 3.8–10.5)
WBC # BLD: 10.11 K/UL — SIGNIFICANT CHANGE UP (ref 3.8–10.5)
WBC # FLD AUTO: 10.11 K/UL — SIGNIFICANT CHANGE UP (ref 3.8–10.5)
WBC # FLD AUTO: 10.11 K/UL — SIGNIFICANT CHANGE UP (ref 3.8–10.5)
WBC UR QL: 0 /HPF — SIGNIFICANT CHANGE UP (ref 0–5)
WBC UR QL: 0 /HPF — SIGNIFICANT CHANGE UP (ref 0–5)

## 2023-12-10 PROCEDURE — 36415 COLL VENOUS BLD VENIPUNCTURE: CPT

## 2023-12-10 PROCEDURE — 70496 CT ANGIOGRAPHY HEAD: CPT | Mod: MA

## 2023-12-10 PROCEDURE — 86900 BLOOD TYPING SEROLOGIC ABO: CPT

## 2023-12-10 PROCEDURE — 82962 GLUCOSE BLOOD TEST: CPT

## 2023-12-10 PROCEDURE — 71045 X-RAY EXAM CHEST 1 VIEW: CPT | Mod: 26

## 2023-12-10 PROCEDURE — 81001 URINALYSIS AUTO W/SCOPE: CPT

## 2023-12-10 PROCEDURE — 85730 THROMBOPLASTIN TIME PARTIAL: CPT

## 2023-12-10 PROCEDURE — 99285 EMERGENCY DEPT VISIT HI MDM: CPT

## 2023-12-10 PROCEDURE — 70450 CT HEAD/BRAIN W/O DYE: CPT | Mod: MA

## 2023-12-10 PROCEDURE — 86901 BLOOD TYPING SEROLOGIC RH(D): CPT

## 2023-12-10 PROCEDURE — 85025 COMPLETE CBC W/AUTO DIFF WBC: CPT

## 2023-12-10 PROCEDURE — 86850 RBC ANTIBODY SCREEN: CPT

## 2023-12-10 PROCEDURE — 70498 CT ANGIOGRAPHY NECK: CPT | Mod: 26,MA

## 2023-12-10 PROCEDURE — 70496 CT ANGIOGRAPHY HEAD: CPT | Mod: 26,MA

## 2023-12-10 PROCEDURE — 70450 CT HEAD/BRAIN W/O DYE: CPT | Mod: 26,MA,59

## 2023-12-10 PROCEDURE — 87086 URINE CULTURE/COLONY COUNT: CPT

## 2023-12-10 PROCEDURE — 71250 CT THORAX DX C-: CPT | Mod: 26,MA

## 2023-12-10 PROCEDURE — 70498 CT ANGIOGRAPHY NECK: CPT | Mod: MA

## 2023-12-10 PROCEDURE — 85610 PROTHROMBIN TIME: CPT

## 2023-12-10 PROCEDURE — 80053 COMPREHEN METABOLIC PANEL: CPT

## 2023-12-10 PROCEDURE — 71250 CT THORAX DX C-: CPT | Mod: MA

## 2023-12-10 PROCEDURE — 71045 X-RAY EXAM CHEST 1 VIEW: CPT

## 2023-12-10 PROCEDURE — 99284 EMERGENCY DEPT VISIT MOD MDM: CPT | Mod: 25

## 2023-12-10 NOTE — ED ADULT NURSE NOTE - CINV DISCH TEACH PARTICIP
Requested medication(s) are due for refill today: Yes  Patient has already received a courtesy refill: No  Other reason request has been forwarded to provider: Requirements not met Patient

## 2023-12-10 NOTE — ED ADULT TRIAGE NOTE - CHIEF COMPLAINT QUOTE
4 broken ribs on left side on Nov 15th s/p fall- has been experiencing sob since this morning- pt endorsing difficulty talking due to sob- and chest pain on the left side. Pt has been having progressive weakness in the legs since the fall. befast neg 4 broken ribs on left side on Nov 15th s/p fall- has been experiencing sob since this morning- pt endorsing difficulty talking due to sob- and chest pain on the left side. Pt has been having progressive weakness in the legs since the fall. befast neg LKW 11 am.

## 2023-12-10 NOTE — ED PROVIDER NOTE - CLINICAL SUMMARY MEDICAL DECISION MAKING FREE TEXT BOX
Given history and physical possibility of pneumothorax but less likely given so long out since the injury possibility of meningioma complication such as a bleed given patient is having decreased walking ability/shuffling gait on examination less concern for infectious etiology will likely need to be admitted for physical therapy Inpatient given that she is declining even with outpatient resources

## 2023-12-10 NOTE — ED PROVIDER NOTE - ATTENDING CONTRIBUTION TO CARE
Pt resting as manifested by eyes closed in dark room. Respirations even and easy. Call light and bedside table in reach. Bed in low locked position. Denies any needs at this time. I performed a history and physical exam of the patient and discussed their management with the resident. I reviewed the resident's note and agree with the documented findings and plan of care.  Tiki Hough MD

## 2023-12-10 NOTE — CONSULT NOTE ADULT - ASSESSMENT
78F no AC/AP hx meningioma (dx 2015; seen by Dr. Jackson who followed w/ imaging; s/p SRS fall 2023) with recent [facility] fall November 2023 w/ rib fxs p/f subjective BLE weakness. CTH w/ cavernous sinus meningioma which expands into suprasellar cistern (2.6x2.6x2.4cm). MR brain ordered by neurologist last week (per pt and son, lesion has decreased in size s/p SRS). Exam: AOx3, L pupil 4 and reactive, R pupil 3 and reactive, L upward gaze palsy (pt's baseline). MORAN 5/5, no drift, SILT.  -no acute neurosurgical intervention  -leg weakness likely explained by deconditioning s/p [facility] fall in November; continue outpatient PT  -f/u w/ Dr. Mccurdy outpatient in 1-2 weeks 78F no AC/AP hx meningioma (dx 2015; seen by Dr. Jackson who followed w/ imaging; s/p SRS fall 2023) with recent [facility] fall November 2023 w/ rib fxs p/f subjective BLE weakness. CTH w/ cavernous sinus meningioma which expands into suprasellar cistern (2.6x2.6x2.4cm). MR brain ordered by neurologist last week (per pt and son, lesion has decreased in size s/p SRS). Exam: AOx3, L pupil 4 and reactive, R pupil 3 and reactive, L upward gaze palsy (pt's baseline). MORAN 5/5, no drift, SILT.  -no acute neurosurgical intervention  -leg weakness likely explained by deconditioning s/p [facility] fall in November; continue outpatient PT  -f/u w/ Dr. Mccurdy outpatient in 1-2 weeks

## 2023-12-10 NOTE — ED PROVIDER NOTE - PATIENT PORTAL LINK FT
You can access the FollowMyHealth Patient Portal offered by [facility] by registering at the following website: [email or web address]. By joining "[employer]’s FollowMyHealth portal, you will also be able to view your health information using other applications (apps) compatible with our system. You can access the FollowMyHealth Patient Portal offered by [facility] by registering at the following website: [email or web address]. By joining [employer]’s FollowMyHealth portal, you will also be able to view your health information using other applications (apps) compatible with our system.

## 2023-12-10 NOTE — ED ADULT NURSE NOTE - OBJECTIVE STATEMENT
78y F BIB self accompanied by son p/w shortness of breath, memory impairment and worsening ESCOBAR. Pt is axo4, ambulates independently at baseline. PMH of meningioma. Son mentions that she is increasingly more confused at her baseline and answering questions that seem bizarre. Is getting at home PT for the past month 2x a week and had a fall with 4 rib fractures mid November went to OSH. Denies headache, dizziness, chest pain, abdominal pain, nausea, vomiting, diarrhea, fevers, chills, dysuria, hematuria, recent illness travel or fall. Patient undressed and placed into gown, call bell in hand and side rails up with bed in lowest position for safety. blanket provided. Comfort and safety provided. 78y F BIB self accompanied by son p/w shortness of breath, memory impairment and worsening ESCOBAR. Pt is axo4, ambulates independently at baseline. PMH of meningioma. Son mentions that she is increasingly more confused at her baseline and answering questions that seem bizarre. Is getting at home PT for the past month 2x a week and had a fall with 4 rib fractures mid November went to OSH. Mentions an increase in unsteady gait since the fall, requiring more assistance than baseline. Denies headache, dizziness, chest pain, abdominal pain, nausea, vomiting, diarrhea, fevers, chills, dysuria, hematuria, recent illness travel or fall. Patient undressed and placed into gown, call bell in hand and side rails up with bed in lowest position for safety. blanket provided. Comfort and safety provided.

## 2023-12-10 NOTE — ED ADULT NURSE NOTE - NSFALLUNIVINTERV_ED_ALL_ED
Bed/Stretcher in lowest position, wheels locked, appropriate side rails in place/Call bell, personal items and telephone in reach/Instruct patient to call for assistance before getting out of bed/chair/stretcher/Non-slip footwear applied when patient is off stretcher/[place] to call system/Physically safe environment - no spills, clutter or unnecessary equipment/Purposeful proactive rounding/Room/bathroom lighting operational, light cord in reach Bed/Stretcher in lowest position, wheels locked, appropriate side rails in place/Call bell, personal items and telephone in reach/Instruct patient to call for assistance before getting out of bed/chair/stretcher/Non-slip footwear applied when patient is off stretcher/[place] to call system/Physically safe environment - no spills, clutter or unnecessary equipment/Purposeful proactive rounding/Room/bathroom lighting operational, light cord in reach

## 2023-12-10 NOTE — CONSULT NOTE ADULT - SUBJECTIVE AND OBJECTIVE BOX
p (4538) 72F no AC/AP hx meningioma (dx 2015; seen by Dr. Jackson who followed w/ imaging; s/p SRS fall 2023) with recent [facility] fall November 2023 w/ rib fxs p/f subjective BLE weakness. CTH w/ cavernous sinus meningioma which expands into suprasellar cistern (2.6x2.6x2.4cm). MR brain ordered by neurologist last week (per pt and son, lesion has decreased in size s/p SRS). Exam: AOx3, L pupil 4 and reactive, R pupil 3 and reactive, L upward gaze palsy (pt's baseline). MORAN 5/5, no drift, SILT.    --Anticoagulation:    =====================  PAST MEDICAL HISTORY     PAST SURGICAL HISTORY     No Known Allergies      MEDICATIONS:  Antibiotics:    Neuro:    Other:      SOCIAL HISTORY:   Occupation:   Marital Status:     FAMILY HISTORY:      ROS: Negative except per HPI    LABS:  PT/INR - ( 10 Dec 2023 15:11 )   PT: 12.2 sec;   INR: 1.17 ratio         PTT - ( 10 Dec 2023 15:11 )  PTT:30.8 sec                        12.2   10.11 )-----------( 360      ( 10 Dec 2023 14:34 )             38.4     12-10    137  |  103  |  11  ----------------------------<  88  4.1   |  22  |  0.57    Ca    9.4      10 Dec 2023 14:34    TPro  7.2  /  Alb  4.0  /  TBili  0.2  /  DBili  x   /  AST  19  /  ALT  18  /  AlkPhos  146<H>  12-10       p (3524) 49F no AC/AP hx meningioma (dx 2015; seen by Dr. Jackson who followed w/ imaging; s/p SRS fall 2023) with recent [facility] fall November 2023 w/ rib fxs p/f subjective BLE weakness. CTH w/ cavernous sinus meningioma which expands into suprasellar cistern (2.6x2.6x2.4cm). MR brain ordered by neurologist last week (per pt and son, lesion has decreased in size s/p SRS). Exam: AOx3, L pupil 4 and reactive, R pupil 3 and reactive, L upward gaze palsy (pt's baseline). MORAN 5/5, no drift, SILT.    --Anticoagulation:    =====================  PAST MEDICAL HISTORY     PAST SURGICAL HISTORY     No Known Allergies      MEDICATIONS:  Antibiotics:    Neuro:    Other:      SOCIAL HISTORY:   Occupation:   Marital Status:     FAMILY HISTORY:      ROS: Negative except per HPI    LABS:  PT/INR - ( 10 Dec 2023 15:11 )   PT: 12.2 sec;   INR: 1.17 ratio         PTT - ( 10 Dec 2023 15:11 )  PTT:30.8 sec                        12.2   10.11 )-----------( 360      ( 10 Dec 2023 14:34 )             38.4     12-10    137  |  103  |  11  ----------------------------<  88  4.1   |  22  |  0.57    Ca    9.4      10 Dec 2023 14:34    TPro  7.2  /  Alb  4.0  /  TBili  0.2  /  DBili  x   /  AST  19  /  ALT  18  /  AlkPhos  146<H>  12-10

## 2023-12-10 NOTE — ED PROVIDER NOTE - OBJECTIVE STATEMENT
78-year-old female with known meningioma chief complaint of shortness of breath difficulty walking and memory issues.  Patient present with son who notes that she is increasingly more confused at her baseline and answering questions with I do not know.  Patient does not walk with any assistance/normally walks at baseline by herself.  Patient coming in today because she is having difficulty ambulating.  Patient is getting at home physical therapy for the past month 2 times a week and had a fall with 4 rib fractures mid November.  Patient was state last night she was having some degree of shortness of breath but is not having any shortness of breath or chest pain right now  Son notes that her eyes at baseline for the past year or 2 have been asymmetric  Son denies having any fevers at home

## 2023-12-10 NOTE — ED ADULT NURSE NOTE - CHIEF COMPLAINT QUOTE
4 broken ribs on left side on Nov 15th s/p fall- has been experiencing sob since this morning- pt endorsing difficulty talking due to sob- and chest pain on the left side. Pt has been having progressive weakness in the legs since the fall. befast neg LKW 11 am.

## 2023-12-10 NOTE — ED PROVIDER NOTE - PHYSICAL EXAMINATION
GENERAL: Awake, alert, NAD  HEENT: Left conjugate gaze of the left eye   LUNGS: CTAB, no wheezes or crackles   CARDIAC: RRR, no m/r/g  ABDOMEN: Soft, non tender, non distended, no rebound, no guarding  BACK: No midline spinal tenderness, no CVA tenderness  EXT: No edema, no calf tenderness, no deformities.  NEURO: A&Ox3. Moving all extremities.  SKIN: Warm and dry. No rash.  PSYCH: Normal affect.  IS 1500mL

## 2023-12-10 NOTE — ED PROVIDER NOTE - NSFOLLOWUPINSTRUCTIONS_ED_ALL_ED_FT
Today you were seen in the ED for difficulty walking     It was found that you have No signs of medical emergency warranting further evaluation in the emergency department. You were offered an admission but ultimately declined.     A copy of your results attached this document below.    Please follow up with Your primary care provider in regards to today's event.    SEEK IMMEDIATE MEDICAL CARE IF YOU HAVE ANY OF THE FOLLOWING SYMPTOMS: vomiting, changes in your vision or speech, weakness in your arms or legs, trouble speaking or swallowing, chest pain, abdominal pain, shortness of breath, sweating, bleeding, headache, neck pain, or fever.

## 2023-12-10 NOTE — ED ADULT NURSE REASSESSMENT NOTE - NS ED NURSE REASSESS COMMENT FT1
Received report from LISETH Townsend. Pt AOx4 with stable VS. Comfort care and safety measures provided. Pending neuro surgery recommendations.

## 2023-12-11 LAB
CULTURE RESULTS: SIGNIFICANT CHANGE UP
CULTURE RESULTS: SIGNIFICANT CHANGE UP
SPECIMEN SOURCE: SIGNIFICANT CHANGE UP
SPECIMEN SOURCE: SIGNIFICANT CHANGE UP

## 2023-12-13 ENCOUNTER — TRANSCRIPTION ENCOUNTER (OUTPATIENT)
Age: 78
End: 2023-12-13

## 2023-12-13 ENCOUNTER — APPOINTMENT (OUTPATIENT)
Dept: ORTHOPEDIC SURGERY | Facility: CLINIC | Age: 78
End: 2023-12-13
Payer: MEDICARE

## 2023-12-13 ENCOUNTER — NON-APPOINTMENT (OUTPATIENT)
Age: 78
End: 2023-12-13

## 2023-12-13 DIAGNOSIS — M47.816 SPONDYLOSIS W/OUT MYELOPATHY OR RADICULOPATHY, LUMBAR REGION: ICD-10-CM

## 2023-12-13 PROCEDURE — 99203 OFFICE O/P NEW LOW 30 MIN: CPT

## 2023-12-18 ENCOUNTER — APPOINTMENT (OUTPATIENT)
Dept: FAMILY MEDICINE | Facility: CLINIC | Age: 78
End: 2023-12-18

## 2023-12-26 ENCOUNTER — INPATIENT (INPATIENT)
Facility: HOSPITAL | Age: 78
LOS: 14 days | Discharge: NURSING, REHAB OR LONG-TERM CARE | DRG: 31 | End: 2024-01-10
Attending: INTERNAL MEDICINE | Admitting: HOSPITALIST
Payer: MEDICARE

## 2023-12-26 VITALS
SYSTOLIC BLOOD PRESSURE: 142 MMHG | TEMPERATURE: 98 F | RESPIRATION RATE: 18 BRPM | DIASTOLIC BLOOD PRESSURE: 84 MMHG | OXYGEN SATURATION: 99 % | WEIGHT: 115.08 LBS | HEART RATE: 76 BPM

## 2023-12-26 LAB
ALBUMIN SERPL ELPH-MCNC: 4.2 G/DL — SIGNIFICANT CHANGE UP (ref 3.3–5)
ALBUMIN SERPL ELPH-MCNC: 4.2 G/DL — SIGNIFICANT CHANGE UP (ref 3.3–5)
ALP SERPL-CCNC: 117 U/L — SIGNIFICANT CHANGE UP (ref 40–120)
ALP SERPL-CCNC: 117 U/L — SIGNIFICANT CHANGE UP (ref 40–120)
ALT FLD-CCNC: 12 U/L — SIGNIFICANT CHANGE UP (ref 10–45)
ALT FLD-CCNC: 12 U/L — SIGNIFICANT CHANGE UP (ref 10–45)
ANION GAP SERPL CALC-SCNC: 12 MMOL/L — SIGNIFICANT CHANGE UP (ref 5–17)
ANION GAP SERPL CALC-SCNC: 12 MMOL/L — SIGNIFICANT CHANGE UP (ref 5–17)
AST SERPL-CCNC: 18 U/L — SIGNIFICANT CHANGE UP (ref 10–40)
AST SERPL-CCNC: 18 U/L — SIGNIFICANT CHANGE UP (ref 10–40)
BASOPHILS # BLD AUTO: 0.06 K/UL — SIGNIFICANT CHANGE UP (ref 0–0.2)
BASOPHILS # BLD AUTO: 0.06 K/UL — SIGNIFICANT CHANGE UP (ref 0–0.2)
BASOPHILS NFR BLD AUTO: 0.6 % — SIGNIFICANT CHANGE UP (ref 0–2)
BASOPHILS NFR BLD AUTO: 0.6 % — SIGNIFICANT CHANGE UP (ref 0–2)
BILIRUB SERPL-MCNC: 0.2 MG/DL — SIGNIFICANT CHANGE UP (ref 0.2–1.2)
BILIRUB SERPL-MCNC: 0.2 MG/DL — SIGNIFICANT CHANGE UP (ref 0.2–1.2)
BUN SERPL-MCNC: 11 MG/DL — SIGNIFICANT CHANGE UP (ref 7–23)
BUN SERPL-MCNC: 11 MG/DL — SIGNIFICANT CHANGE UP (ref 7–23)
CALCIUM SERPL-MCNC: 9.5 MG/DL — SIGNIFICANT CHANGE UP (ref 8.4–10.5)
CALCIUM SERPL-MCNC: 9.5 MG/DL — SIGNIFICANT CHANGE UP (ref 8.4–10.5)
CHLORIDE SERPL-SCNC: 106 MMOL/L — SIGNIFICANT CHANGE UP (ref 96–108)
CHLORIDE SERPL-SCNC: 106 MMOL/L — SIGNIFICANT CHANGE UP (ref 96–108)
CO2 SERPL-SCNC: 23 MMOL/L — SIGNIFICANT CHANGE UP (ref 22–31)
CO2 SERPL-SCNC: 23 MMOL/L — SIGNIFICANT CHANGE UP (ref 22–31)
CREAT SERPL-MCNC: 0.53 MG/DL — SIGNIFICANT CHANGE UP (ref 0.5–1.3)
CREAT SERPL-MCNC: 0.53 MG/DL — SIGNIFICANT CHANGE UP (ref 0.5–1.3)
EGFR: 96 ML/MIN/1.73M2 — SIGNIFICANT CHANGE UP
EGFR: 96 ML/MIN/1.73M2 — SIGNIFICANT CHANGE UP
EOSINOPHIL # BLD AUTO: 0.3 K/UL — SIGNIFICANT CHANGE UP (ref 0–0.5)
EOSINOPHIL # BLD AUTO: 0.3 K/UL — SIGNIFICANT CHANGE UP (ref 0–0.5)
EOSINOPHIL NFR BLD AUTO: 3.2 % — SIGNIFICANT CHANGE UP (ref 0–6)
EOSINOPHIL NFR BLD AUTO: 3.2 % — SIGNIFICANT CHANGE UP (ref 0–6)
FLUAV AG NPH QL: SIGNIFICANT CHANGE UP
FLUAV AG NPH QL: SIGNIFICANT CHANGE UP
FLUBV AG NPH QL: SIGNIFICANT CHANGE UP
FLUBV AG NPH QL: SIGNIFICANT CHANGE UP
GLUCOSE SERPL-MCNC: 101 MG/DL — HIGH (ref 70–99)
GLUCOSE SERPL-MCNC: 101 MG/DL — HIGH (ref 70–99)
HCT VFR BLD CALC: 40.5 % — SIGNIFICANT CHANGE UP (ref 34.5–45)
HCT VFR BLD CALC: 40.5 % — SIGNIFICANT CHANGE UP (ref 34.5–45)
HGB BLD-MCNC: 13 G/DL — SIGNIFICANT CHANGE UP (ref 11.5–15.5)
HGB BLD-MCNC: 13 G/DL — SIGNIFICANT CHANGE UP (ref 11.5–15.5)
IMM GRANULOCYTES NFR BLD AUTO: 0.3 % — SIGNIFICANT CHANGE UP (ref 0–0.9)
IMM GRANULOCYTES NFR BLD AUTO: 0.3 % — SIGNIFICANT CHANGE UP (ref 0–0.9)
LYMPHOCYTES # BLD AUTO: 2.8 K/UL — SIGNIFICANT CHANGE UP (ref 1–3.3)
LYMPHOCYTES # BLD AUTO: 2.8 K/UL — SIGNIFICANT CHANGE UP (ref 1–3.3)
LYMPHOCYTES # BLD AUTO: 30 % — SIGNIFICANT CHANGE UP (ref 13–44)
LYMPHOCYTES # BLD AUTO: 30 % — SIGNIFICANT CHANGE UP (ref 13–44)
MCHC RBC-ENTMCNC: 25.9 PG — LOW (ref 27–34)
MCHC RBC-ENTMCNC: 25.9 PG — LOW (ref 27–34)
MCHC RBC-ENTMCNC: 32.1 GM/DL — SIGNIFICANT CHANGE UP (ref 32–36)
MCHC RBC-ENTMCNC: 32.1 GM/DL — SIGNIFICANT CHANGE UP (ref 32–36)
MCV RBC AUTO: 80.8 FL — SIGNIFICANT CHANGE UP (ref 80–100)
MCV RBC AUTO: 80.8 FL — SIGNIFICANT CHANGE UP (ref 80–100)
MONOCYTES # BLD AUTO: 0.85 K/UL — SIGNIFICANT CHANGE UP (ref 0–0.9)
MONOCYTES # BLD AUTO: 0.85 K/UL — SIGNIFICANT CHANGE UP (ref 0–0.9)
MONOCYTES NFR BLD AUTO: 9.1 % — SIGNIFICANT CHANGE UP (ref 2–14)
MONOCYTES NFR BLD AUTO: 9.1 % — SIGNIFICANT CHANGE UP (ref 2–14)
NEUTROPHILS # BLD AUTO: 5.29 K/UL — SIGNIFICANT CHANGE UP (ref 1.8–7.4)
NEUTROPHILS # BLD AUTO: 5.29 K/UL — SIGNIFICANT CHANGE UP (ref 1.8–7.4)
NEUTROPHILS NFR BLD AUTO: 56.8 % — SIGNIFICANT CHANGE UP (ref 43–77)
NEUTROPHILS NFR BLD AUTO: 56.8 % — SIGNIFICANT CHANGE UP (ref 43–77)
NRBC # BLD: 0 /100 WBCS — SIGNIFICANT CHANGE UP (ref 0–0)
NRBC # BLD: 0 /100 WBCS — SIGNIFICANT CHANGE UP (ref 0–0)
PLATELET # BLD AUTO: 308 K/UL — SIGNIFICANT CHANGE UP (ref 150–400)
PLATELET # BLD AUTO: 308 K/UL — SIGNIFICANT CHANGE UP (ref 150–400)
POTASSIUM SERPL-MCNC: 3.6 MMOL/L — SIGNIFICANT CHANGE UP (ref 3.5–5.3)
POTASSIUM SERPL-MCNC: 3.6 MMOL/L — SIGNIFICANT CHANGE UP (ref 3.5–5.3)
POTASSIUM SERPL-SCNC: 3.6 MMOL/L — SIGNIFICANT CHANGE UP (ref 3.5–5.3)
POTASSIUM SERPL-SCNC: 3.6 MMOL/L — SIGNIFICANT CHANGE UP (ref 3.5–5.3)
PROT SERPL-MCNC: 7.7 G/DL — SIGNIFICANT CHANGE UP (ref 6–8.3)
PROT SERPL-MCNC: 7.7 G/DL — SIGNIFICANT CHANGE UP (ref 6–8.3)
RBC # BLD: 5.01 M/UL — SIGNIFICANT CHANGE UP (ref 3.8–5.2)
RBC # BLD: 5.01 M/UL — SIGNIFICANT CHANGE UP (ref 3.8–5.2)
RBC # FLD: 14.7 % — HIGH (ref 10.3–14.5)
RBC # FLD: 14.7 % — HIGH (ref 10.3–14.5)
RSV RNA NPH QL NAA+NON-PROBE: SIGNIFICANT CHANGE UP
RSV RNA NPH QL NAA+NON-PROBE: SIGNIFICANT CHANGE UP
SARS-COV-2 RNA SPEC QL NAA+PROBE: SIGNIFICANT CHANGE UP
SARS-COV-2 RNA SPEC QL NAA+PROBE: SIGNIFICANT CHANGE UP
SODIUM SERPL-SCNC: 141 MMOL/L — SIGNIFICANT CHANGE UP (ref 135–145)
SODIUM SERPL-SCNC: 141 MMOL/L — SIGNIFICANT CHANGE UP (ref 135–145)
WBC # BLD: 9.33 K/UL — SIGNIFICANT CHANGE UP (ref 3.8–10.5)
WBC # BLD: 9.33 K/UL — SIGNIFICANT CHANGE UP (ref 3.8–10.5)
WBC # FLD AUTO: 9.33 K/UL — SIGNIFICANT CHANGE UP (ref 3.8–10.5)
WBC # FLD AUTO: 9.33 K/UL — SIGNIFICANT CHANGE UP (ref 3.8–10.5)

## 2023-12-26 PROCEDURE — 99285 EMERGENCY DEPT VISIT HI MDM: CPT

## 2023-12-26 PROCEDURE — 71045 X-RAY EXAM CHEST 1 VIEW: CPT | Mod: 26

## 2023-12-26 PROCEDURE — 70450 CT HEAD/BRAIN W/O DYE: CPT | Mod: 26,MA

## 2023-12-26 NOTE — ED PROVIDER NOTE - CLINICAL SUMMARY MEDICAL DECISION MAKING FREE TEXT BOX
78-year-old female with a past medical history of meningioma presenting with difficulty walking over the last 1 to 2 weeks with associated mild short-term memory loss. Patient had recent radiation for meningioma. Son having difficulty managing patient's weakness at home and requesting admission for physical therapy and rehab placement. 78-year-old female with a past medical history of meningioma presenting with difficulty walking over the last 1 to 2 weeks with associated mild short-term memory loss. Patient had recent radiation for meningioma. Son having difficulty managing patient's weakness at home and requesting admission for physical therapy and rehab placement.    pettet attending- see attending attestation for my mdm

## 2023-12-26 NOTE — ED PROVIDER NOTE - ATTENDING CONTRIBUTION TO CARE
I, Girish Posada, performed a history and physical exam of the patient and discussed their management with the resident and/or advanced care provider. I reviewed the resident and/or advanced care provider's note and agree with the documented findings and plan of care. I was present and available for all procedures.    79yo F with PMH of meningioma s/p radiation, presenting with son c/o worsening generalized weakness and (per son) memory loss x 1-2 weeks. Reports pt fell on 11/15 and broke 4 ribs, has since been recovering and has had difficulty walking. Son has been living with patient, but patient had previously been living alone, and son thinks pt can benefit from rehab. Of note, reports known vascular issue from meningioma but told by her doctors that no intervention is needed. Denies fever/chills, CP, SOB, cough, abdominal pain, n/v/d, urinary symptoms.     Well appearing and in NAD, head normal appearing atraumatic, trachea midline, no respiratory distress, lungs cta bilaterally, rrr no murmurs, soft NT ND abdomen, no visible extremity deformities, Alert and oriented, non focal neuro exam, skin warm and dry, normal affect and mood, no leg swelling, calf ttp or jvd    Patient well-appearing reassuring vital signs and exam otherwise weak and unsteady gait will evaluate for infectious metabolic etiologies versus CT head for etiology of weakness discussed with son at bedside agreeable plan unlikely ACS PE pneumothorax dissection AAA pneumonia disposition for admission for further physical therapy evaluation and rehab placement I, Girish Posada, performed a history and physical exam of the patient and discussed their management with the resident and/or advanced care provider. I reviewed the resident and/or advanced care provider's note and agree with the documented findings and plan of care. I was present and available for all procedures.    77yo F with PMH of meningioma s/p radiation, presenting with son c/o worsening generalized weakness and (per son) memory loss x 1-2 weeks. Reports pt fell on 11/15 and broke 4 ribs, has since been recovering and has had difficulty walking. Son has been living with patient, but patient had previously been living alone, and son thinks pt can benefit from rehab. Of note, reports known vascular issue from meningioma but told by her doctors that no intervention is needed. Denies fever/chills, CP, SOB, cough, abdominal pain, n/v/d, urinary symptoms.     Well appearing and in NAD, head normal appearing atraumatic, trachea midline, no respiratory distress, lungs cta bilaterally, rrr no murmurs, soft NT ND abdomen, no visible extremity deformities, Alert and oriented, non focal neuro exam, skin warm and dry, normal affect and mood, no leg swelling, calf ttp or jvd    Patient well-appearing reassuring vital signs and exam otherwise weak and unsteady gait will evaluate for infectious metabolic etiologies versus CT head for etiology of weakness discussed with son at bedside agreeable plan unlikely ACS PE pneumothorax dissection AAA pneumonia disposition for admission for further physical therapy evaluation and rehab placement

## 2023-12-26 NOTE — ED ADULT TRIAGE NOTE - CHIEF COMPLAINT QUOTE
lower extremity weakness/ generalized weakness since end of november, not improving. hx meningioma. no new symptoms.

## 2023-12-26 NOTE — ED PROVIDER NOTE - PROGRESS NOTE DETAILS
lawrence- discussed with hospitalist and the patient was admitted to medicine for further evaluation and treatment/management

## 2023-12-26 NOTE — ED PROVIDER NOTE - PHYSICAL EXAMINATION
General: Appears well and nontoxic  Mentation: AAO x 3  psych: mood appropriate  HEENT: airway patent, conjunctivae clear bilaterally  Resp: symmetric chest rise, no resp distress, breath sounds CTA bilaterally  Cardio: RRR, no m/r/g  GI: soft/nondistended/nontender  Neuro: sensation and motor function grossly intact  Skin: no cyanosis, no jaundice  Lymph/Vasc: no LE edema

## 2023-12-26 NOTE — ED PROVIDER NOTE - RAPID ASSESSMENT
79yo F with PMH of meningioma s/p radiation, presenting with son c/o worsening generalized weakness and (per son) memory loss x 1-2 weeks. Reports pt fell on 11/15 and broke 4 ribs, has since been recovering and has had difficulty walking. Son has been living with patient, but patient had previously been living alone, and son thinks pt can benefit from rehab. Of note, reports known vascular issue from meningioma but told by her doctors that no intervention is needed. Denies fever/chills, CP, SOB, cough, abdominal pain, n/v/d, urinary symptoms.     P/E: Patient is well-appearing in triage, NAD, sitting comfortably.     ZEN Cummings: Patient seen by myself in triage area for rapid assessment only. Full H&P and complete work-up to be performed in main emergency department by ED providers. 77yo F with PMH of meningioma s/p radiation, presenting with son c/o worsening generalized weakness and (per son) memory loss x 1-2 weeks. Reports pt fell on 11/15 and broke 4 ribs, has since been recovering and has had difficulty walking. Son has been living with patient, but patient had previously been living alone, and son thinks pt can benefit from rehab. Of note, reports known vascular issue from meningioma but told by her doctors that no intervention is needed. Denies fever/chills, CP, SOB, cough, abdominal pain, n/v/d, urinary symptoms.     P/E: Patient is well-appearing in triage, NAD, sitting comfortably.     ZEN Cummings: Patient seen by myself in triage area for rapid assessment only. Full H&P and complete work-up to be performed in main emergency department by ED providers.    YURY, Attending Victoria Layne MD, cosign the quick triage assessment. I was available for supervisory role while patient was evaluated in the waiting room but did not directly see or examine the patient. Patient was later seen and underwent complete evaluation by another ED attending.

## 2023-12-26 NOTE — ED PROVIDER NOTE - OBJECTIVE STATEMENT
78-year-old female with a past medical history of meningioma presenting with worsening weakness. Son at bedside states that the patient has had worsening walking over the last 2 weeks with some memory deficits. Denies recent fevers, chest pain, nausea, vomiting, abdominal pain. So saying that even living with his mother to help her get around the house but is having difficulty and requesting admission for physical therapy and rehab.

## 2023-12-27 ENCOUNTER — TRANSCRIPTION ENCOUNTER (OUTPATIENT)
Age: 78
End: 2023-12-27

## 2023-12-27 DIAGNOSIS — S22.39XA FRACTURE OF ONE RIB, UNSPECIFIED SIDE, INITIAL ENCOUNTER FOR CLOSED FRACTURE: ICD-10-CM

## 2023-12-27 DIAGNOSIS — N39.0 URINARY TRACT INFECTION, SITE NOT SPECIFIED: ICD-10-CM

## 2023-12-27 DIAGNOSIS — R53.1 WEAKNESS: ICD-10-CM

## 2023-12-27 DIAGNOSIS — R62.7 ADULT FAILURE TO THRIVE: ICD-10-CM

## 2023-12-27 DIAGNOSIS — D32.9 BENIGN NEOPLASM OF MENINGES, UNSPECIFIED: ICD-10-CM

## 2023-12-27 DIAGNOSIS — Z29.9 ENCOUNTER FOR PROPHYLACTIC MEASURES, UNSPECIFIED: ICD-10-CM

## 2023-12-27 LAB
ANION GAP SERPL CALC-SCNC: 13 MMOL/L — SIGNIFICANT CHANGE UP (ref 5–17)
ANION GAP SERPL CALC-SCNC: 13 MMOL/L — SIGNIFICANT CHANGE UP (ref 5–17)
APPEARANCE UR: ABNORMAL
APPEARANCE UR: ABNORMAL
BACTERIA # UR AUTO: ABNORMAL /HPF
BACTERIA # UR AUTO: ABNORMAL /HPF
BILIRUB UR-MCNC: NEGATIVE — SIGNIFICANT CHANGE UP
BILIRUB UR-MCNC: NEGATIVE — SIGNIFICANT CHANGE UP
BUN SERPL-MCNC: 10 MG/DL — SIGNIFICANT CHANGE UP (ref 7–23)
BUN SERPL-MCNC: 10 MG/DL — SIGNIFICANT CHANGE UP (ref 7–23)
CALCIUM SERPL-MCNC: 9.2 MG/DL — SIGNIFICANT CHANGE UP (ref 8.4–10.5)
CALCIUM SERPL-MCNC: 9.2 MG/DL — SIGNIFICANT CHANGE UP (ref 8.4–10.5)
CAST: 3 /LPF — SIGNIFICANT CHANGE UP (ref 0–4)
CAST: 3 /LPF — SIGNIFICANT CHANGE UP (ref 0–4)
CHLORIDE SERPL-SCNC: 105 MMOL/L — SIGNIFICANT CHANGE UP (ref 96–108)
CHLORIDE SERPL-SCNC: 105 MMOL/L — SIGNIFICANT CHANGE UP (ref 96–108)
CO2 SERPL-SCNC: 25 MMOL/L — SIGNIFICANT CHANGE UP (ref 22–31)
CO2 SERPL-SCNC: 25 MMOL/L — SIGNIFICANT CHANGE UP (ref 22–31)
COLOR SPEC: YELLOW — SIGNIFICANT CHANGE UP
COLOR SPEC: YELLOW — SIGNIFICANT CHANGE UP
CREAT SERPL-MCNC: 0.57 MG/DL — SIGNIFICANT CHANGE UP (ref 0.5–1.3)
CREAT SERPL-MCNC: 0.57 MG/DL — SIGNIFICANT CHANGE UP (ref 0.5–1.3)
DIFF PNL FLD: ABNORMAL
DIFF PNL FLD: ABNORMAL
EGFR: 94 ML/MIN/1.73M2 — SIGNIFICANT CHANGE UP
EGFR: 94 ML/MIN/1.73M2 — SIGNIFICANT CHANGE UP
GLUCOSE SERPL-MCNC: 85 MG/DL — SIGNIFICANT CHANGE UP (ref 70–99)
GLUCOSE SERPL-MCNC: 85 MG/DL — SIGNIFICANT CHANGE UP (ref 70–99)
GLUCOSE UR QL: NEGATIVE MG/DL — SIGNIFICANT CHANGE UP
GLUCOSE UR QL: NEGATIVE MG/DL — SIGNIFICANT CHANGE UP
HCT VFR BLD CALC: 39.6 % — SIGNIFICANT CHANGE UP (ref 34.5–45)
HCT VFR BLD CALC: 39.6 % — SIGNIFICANT CHANGE UP (ref 34.5–45)
HGB BLD-MCNC: 12.5 G/DL — SIGNIFICANT CHANGE UP (ref 11.5–15.5)
HGB BLD-MCNC: 12.5 G/DL — SIGNIFICANT CHANGE UP (ref 11.5–15.5)
KETONES UR-MCNC: 15 MG/DL
KETONES UR-MCNC: 15 MG/DL
LEUKOCYTE ESTERASE UR-ACNC: ABNORMAL
LEUKOCYTE ESTERASE UR-ACNC: ABNORMAL
MAGNESIUM SERPL-MCNC: 2.4 MG/DL — SIGNIFICANT CHANGE UP (ref 1.6–2.6)
MAGNESIUM SERPL-MCNC: 2.4 MG/DL — SIGNIFICANT CHANGE UP (ref 1.6–2.6)
MCHC RBC-ENTMCNC: 25.8 PG — LOW (ref 27–34)
MCHC RBC-ENTMCNC: 25.8 PG — LOW (ref 27–34)
MCHC RBC-ENTMCNC: 31.6 GM/DL — LOW (ref 32–36)
MCHC RBC-ENTMCNC: 31.6 GM/DL — LOW (ref 32–36)
MCV RBC AUTO: 81.6 FL — SIGNIFICANT CHANGE UP (ref 80–100)
MCV RBC AUTO: 81.6 FL — SIGNIFICANT CHANGE UP (ref 80–100)
NITRITE UR-MCNC: NEGATIVE — SIGNIFICANT CHANGE UP
NITRITE UR-MCNC: NEGATIVE — SIGNIFICANT CHANGE UP
NRBC # BLD: 0 /100 WBCS — SIGNIFICANT CHANGE UP (ref 0–0)
NRBC # BLD: 0 /100 WBCS — SIGNIFICANT CHANGE UP (ref 0–0)
PH UR: 5.5 — SIGNIFICANT CHANGE UP (ref 5–8)
PH UR: 5.5 — SIGNIFICANT CHANGE UP (ref 5–8)
PHOSPHATE SERPL-MCNC: 3.2 MG/DL — SIGNIFICANT CHANGE UP (ref 2.5–4.5)
PHOSPHATE SERPL-MCNC: 3.2 MG/DL — SIGNIFICANT CHANGE UP (ref 2.5–4.5)
PLATELET # BLD AUTO: 280 K/UL — SIGNIFICANT CHANGE UP (ref 150–400)
PLATELET # BLD AUTO: 280 K/UL — SIGNIFICANT CHANGE UP (ref 150–400)
POTASSIUM SERPL-MCNC: 3.5 MMOL/L — SIGNIFICANT CHANGE UP (ref 3.5–5.3)
POTASSIUM SERPL-MCNC: 3.5 MMOL/L — SIGNIFICANT CHANGE UP (ref 3.5–5.3)
POTASSIUM SERPL-SCNC: 3.5 MMOL/L — SIGNIFICANT CHANGE UP (ref 3.5–5.3)
POTASSIUM SERPL-SCNC: 3.5 MMOL/L — SIGNIFICANT CHANGE UP (ref 3.5–5.3)
PROT UR-MCNC: SIGNIFICANT CHANGE UP MG/DL
PROT UR-MCNC: SIGNIFICANT CHANGE UP MG/DL
RBC # BLD: 4.85 M/UL — SIGNIFICANT CHANGE UP (ref 3.8–5.2)
RBC # BLD: 4.85 M/UL — SIGNIFICANT CHANGE UP (ref 3.8–5.2)
RBC # FLD: 14.6 % — HIGH (ref 10.3–14.5)
RBC # FLD: 14.6 % — HIGH (ref 10.3–14.5)
RBC CASTS # UR COMP ASSIST: 2 /HPF — SIGNIFICANT CHANGE UP (ref 0–4)
RBC CASTS # UR COMP ASSIST: 2 /HPF — SIGNIFICANT CHANGE UP (ref 0–4)
SODIUM SERPL-SCNC: 143 MMOL/L — SIGNIFICANT CHANGE UP (ref 135–145)
SODIUM SERPL-SCNC: 143 MMOL/L — SIGNIFICANT CHANGE UP (ref 135–145)
SP GR SPEC: 1.02 — SIGNIFICANT CHANGE UP (ref 1–1.03)
SP GR SPEC: 1.02 — SIGNIFICANT CHANGE UP (ref 1–1.03)
SQUAMOUS # UR AUTO: 3 /HPF — SIGNIFICANT CHANGE UP (ref 0–5)
SQUAMOUS # UR AUTO: 3 /HPF — SIGNIFICANT CHANGE UP (ref 0–5)
UROBILINOGEN FLD QL: 0.2 MG/DL — SIGNIFICANT CHANGE UP (ref 0.2–1)
UROBILINOGEN FLD QL: 0.2 MG/DL — SIGNIFICANT CHANGE UP (ref 0.2–1)
WBC # BLD: 9.76 K/UL — SIGNIFICANT CHANGE UP (ref 3.8–10.5)
WBC # BLD: 9.76 K/UL — SIGNIFICANT CHANGE UP (ref 3.8–10.5)
WBC # FLD AUTO: 9.76 K/UL — SIGNIFICANT CHANGE UP (ref 3.8–10.5)
WBC # FLD AUTO: 9.76 K/UL — SIGNIFICANT CHANGE UP (ref 3.8–10.5)
WBC UR QL: 253 /HPF — HIGH (ref 0–5)
WBC UR QL: 253 /HPF — HIGH (ref 0–5)

## 2023-12-27 PROCEDURE — 99221 1ST HOSP IP/OBS SF/LOW 40: CPT

## 2023-12-27 PROCEDURE — 99223 1ST HOSP IP/OBS HIGH 75: CPT

## 2023-12-27 PROCEDURE — 70552 MRI BRAIN STEM W/DYE: CPT | Mod: 26

## 2023-12-27 RX ORDER — IBUPROFEN 200 MG
600 TABLET ORAL EVERY 6 HOURS
Refills: 0 | Status: DISCONTINUED | OUTPATIENT
Start: 2023-12-27 | End: 2023-12-29

## 2023-12-27 RX ORDER — LANOLIN ALCOHOL/MO/W.PET/CERES
3 CREAM (GRAM) TOPICAL AT BEDTIME
Refills: 0 | Status: DISCONTINUED | OUTPATIENT
Start: 2023-12-27 | End: 2024-01-04

## 2023-12-27 RX ORDER — ONDANSETRON 8 MG/1
4 TABLET, FILM COATED ORAL EVERY 8 HOURS
Refills: 0 | Status: DISCONTINUED | OUTPATIENT
Start: 2023-12-27 | End: 2024-01-04

## 2023-12-27 RX ORDER — CEFTRIAXONE 500 MG/1
1000 INJECTION, POWDER, FOR SOLUTION INTRAMUSCULAR; INTRAVENOUS EVERY 24 HOURS
Refills: 0 | Status: COMPLETED | OUTPATIENT
Start: 2023-12-28 | End: 2023-12-30

## 2023-12-27 RX ORDER — LIDOCAINE 4 G/100G
1 CREAM TOPICAL EVERY 24 HOURS
Refills: 0 | Status: DISCONTINUED | OUTPATIENT
Start: 2023-12-27 | End: 2024-01-04

## 2023-12-27 RX ORDER — INFLUENZA VIRUS VACCINE 15; 15; 15; 15 UG/.5ML; UG/.5ML; UG/.5ML; UG/.5ML
0.7 SUSPENSION INTRAMUSCULAR ONCE
Refills: 0 | Status: DISCONTINUED | OUTPATIENT
Start: 2023-12-27 | End: 2024-01-10

## 2023-12-27 RX ORDER — ACETAMINOPHEN 500 MG
650 TABLET ORAL EVERY 6 HOURS
Refills: 0 | Status: DISCONTINUED | OUTPATIENT
Start: 2023-12-27 | End: 2024-01-04

## 2023-12-27 RX ORDER — CEFTRIAXONE 500 MG/1
1000 INJECTION, POWDER, FOR SOLUTION INTRAMUSCULAR; INTRAVENOUS ONCE
Refills: 0 | Status: COMPLETED | OUTPATIENT
Start: 2023-12-27 | End: 2023-12-27

## 2023-12-27 RX ADMIN — CEFTRIAXONE 100 MILLIGRAM(S): 500 INJECTION, POWDER, FOR SOLUTION INTRAMUSCULAR; INTRAVENOUS at 01:53

## 2023-12-27 RX ADMIN — LIDOCAINE 1 PATCH: 4 CREAM TOPICAL at 18:59

## 2023-12-27 RX ADMIN — LIDOCAINE 1 PATCH: 4 CREAM TOPICAL at 05:55

## 2023-12-27 RX ADMIN — LIDOCAINE 1 PATCH: 4 CREAM TOPICAL at 06:07

## 2023-12-27 NOTE — DISCHARGE NOTE PROVIDER - HOSPITAL COURSE
78F PMhx meningioma s/p radiation, asthma. Pt fell 11/15 and broke ribs, being tx'd conservatively. Since the fall, pt has declined in function and per son, also having some memory loss 1-2weeks 78F PMhx meningioma s/p radiation, asthma. Pt fell 11/15 and broke ribs, being tx'd conservatively. Since the fall, pt has declined in function and per son, also having some memory loss 1-2weeks. < from: MR Head w/ CSF Flow, w/ IV Cont (12.27.23 @ 23:13) MRI head revealed Large left cavernous sinus mass likely a meningioma encasing and narrowing the left cavernous internal carotid artery. There is mild hydrocephalus with transependymal flow of CSF with  slightly greater dilatation of the left temporal and occipital horns. No acute infarcts are seen. Cine flow study demonstrates flow between the aqueduct and the fourth ventricle and the posterior fossa and cervical spine. Opthalmology consulted to r/o papilledema- no papilledema noted on exam. 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        Patient should follow-up with his/her ophthalmologist or with [facility] Department of Ophthalmology within 1 week of after discharge at:    [address] [facility]  [place] [address]  [phone]         Patient can follow-up with any of the following: [x] MRI brain w/o contrast, can be done outpatient    Neurology  Dr. Kenneth Ascencio MD.   [address], [place].   [phone].    Neurosurgery   Dr. Claudette Carver MD.   [address], [place].   [phone].         78F PMhx meningioma s/p radiation, asthma. Pt fell 11/15 and broke ribs, being tx'd conservatively. Since the fall, pt has declined in function and per son, also having some memory loss 1-2weeks. < from: MR Head w/ CSF Flow, w/ IV Cont (12.27.23 @ 23:13) MRI head revealed Large left cavernous sinus mass likely a meningioma encasing and narrowing the left cavernous internal carotid artery. There is mild hydrocephalus with transependymal flow of CSF with  slightly greater dilatation of the left temporal and occipital horns. No acute infarcts are seen. Cine flow study demonstrates flow between the aqueduct and the fourth ventricle and the posterior fossa and cervical spine. Opthalmology consulted to r/o papilledema- no papilledema noted on exam. 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        Patient should follow-up with his/her ophthalmologist or with [facility] Department of Ophthalmology within 1 week of after discharge at:    [address] [facility]  [place] [address]  [phone]         Patient can follow-up with any of the following: [x] MRI brain w/o contrast, can be done outpatient    Neurology  Dr. Kenneth Ascencio MD.   [address], [place].   [phone].    Neurosurgery   Dr. Claudette Carver MD.   [address], [place].   [phone].         78F PMhx meningioma s/p radiation, asthma. Pt fell 11/15 and broke ribs, being tx'd conservatively. Since the fall, pt has declined in function and per son, also having some memory loss 1-2weeks. MR Head w/ CSF Flow w/ IV Cont (12/27/23) - MRI head revealed Large left cavernous sinus mass likely a meningioma encasing and narrowing the left cavernous internal carotid artery. There is mild hydrocephalus with transependymal flow of CSF with  slightly greater dilatation of the left temporal and occipital horns. No acute infarcts are seen. Cine flow study demonstrates flow between the aqueduct and the fourth ventricle and the posterior fossa and cervical spine. Opthalmology consulted to r/o papilledema- no papilledema noted on exam. 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 Post procedure got vancomycin 4 doses and was placed on ceftriaxone as well as per NSG. Post procedure patient has remained confused AXO 2-->repeat Head CT x 2 latest 1/8 which showed no acute changes and showed stable shunt; this is baseline.         - plan for 7 days of abx- can switch to PO cefpodoxime on DC till 1/11.        Patient should follow-up with his/her ophthalmologist or with [facility] Department of Ophthalmology within 1 week of after discharge at:  [address] [facility]  [place] [address]  [phone]         Patient can follow-up with any of the following: [x] MRI brain w/o contrast, can be done outpatient  Neurology  Dr. Kenneth Ascencio MD.   [address], [place].   [phone].    Neurosurgery   Dr. Claudette Carver MD.   [address], [place].   [phone].         78F PMhx meningioma s/p radiation, asthma. Pt fell 11/15 and broke ribs, being tx'd conservatively. Since the fall, pt has declined in function and per son, also having some memory loss 1-2weeks. MR Head w/ CSF Flow w/ IV Cont (12/27/23) - MRI head revealed Large left cavernous sinus mass likely a meningioma encasing and narrowing the left cavernous internal carotid artery. There is mild hydrocephalus with transependymal flow of CSF with  slightly greater dilatation of the left temporal and occipital horns. No acute infarcts are seen. Cine flow study demonstrates flow between the aqueduct and the fourth ventricle and the posterior fossa and cervical spine. Opthalmology consulted to r/o papilledema- no papilledema noted on exam. 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 Post procedure got vancomycin 4 doses and was placed on ceftriaxone as well as per NSG. Post procedure patient has remained confused AXO 2-->repeat Head CT x 2 latest 1/8 which showed no acute changes and showed stable shunt; this is baseline.         - plan for 7 days of abx- can switch to PO cefpodoxime on DC till 1/11.        Patient should follow-up with his/her ophthalmologist or with [facility] Department of Ophthalmology within 1 week of after discharge at:  [address] [facility]  [place] [address]  [phone]         Patient can follow-up with any of the following: [x] MRI brain w/o contrast, can be done outpatient  Neurology  Dr. Kenneth Ascencio MD.   [address], [place].   [phone].    Neurosurgery   Dr. Claudette Carver MD.   [address], [place].   [phone].         78F PMhx meningioma s/p radiation, asthma. Pt fell 11/15 and broke ribs, being tx'd conservatively. Since the fall, pt has declined in function and per son, also having some memory loss 1-2weeks. MR Head w/ CSF Flow w/ IV Cont (12/27/23) - MRI head revealed Large left cavernous sinus mass likely a meningioma encasing and narrowing the left cavernous internal carotid artery. There is mild hydrocephalus with transependymal flow of CSF with  slightly greater dilatation of the left temporal and occipital horns. No acute infarcts are seen. Cine flow study demonstrates flow between the aqueduct and the fourth ventricle and the posterior fossa and cervical spine. Opthalmology consulted to r/o papilledema- no papilledema noted on exam. 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 Post procedure got vancomycin 4 doses and was placed on ceftriaxone as well as per NSG. Post procedure patient has remained confused AXO 2-->repeat Head CT x 2 latest 1/8 which showed no acute changes and showed stable shunt; this is baseline.         - plan for 7 days of abx- can switch to PO cefpodoxime on DC till 1/11.        Patient should follow-up with his/her ophthalmologist or with [facility] Department of Ophthalmology within 1 week of after discharge at:  [address] [facility]  [place] [address]  [phone]           Neuro:    - will need CTH in 2wks 1/18  -Outpt f/u with Dr. Flores thereafter to consider adjusting shunt to 5    Dr. Flores  [phone]    Alternative neuro physicians:  Neurology  Dr. Kenneth Ascencio MD.   [address], [place].   [phone].    Neurosurgery   Dr. Claudette Carver MD.   [address], [place].   [phone].         78F PMhx meningioma s/p radiation, asthma. Pt fell 11/15 and broke ribs, being tx'd conservatively. Since the fall, pt has declined in function and per son, also having some memory loss 1-2weeks. MR Head w/ CSF Flow w/ IV Cont (12/27/23) - MRI head revealed Large left cavernous sinus mass likely a meningioma encasing and narrowing the left cavernous internal carotid artery. There is mild hydrocephalus with transependymal flow of CSF with  slightly greater dilatation of the left temporal and occipital horns. No acute infarcts are seen. Cine flow study demonstrates flow between the aqueduct and the fourth ventricle and the posterior fossa and cervical spine. Opthalmology consulted to r/o papilledema- no papilledema noted on exam. 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 Post procedure got vancomycin 4 doses and was placed on ceftriaxone as well as per NSG. Post procedure patient has remained confused AXO 2-->repeat Head CT x 2 latest 1/8 which showed no acute changes and showed stable shunt; this is baseline.         - plan for 7 days of abx- can switch to PO cefpodoxime on DC till 1/11.        Patient should follow-up with his/her ophthalmologist or with [facility] Department of Ophthalmology within 1 week of after discharge at:  [address] [facility]  [place] [address]  [phone]           Neuro:    - will need CTH in 2wks 1/18  -Outpt f/u with Dr. Flores thereafter to consider adjusting shunt to 5    Dr. Flores  [phone]    Alternative neuro physicians:  Neurology  Dr. Kenneth Ascencio MD.   [address] [place], [place].   [phone].    Neurosurgery   Dr. Claudette Carver MD.   [address], [place].   [phone].         78F PMhx meningioma s/p radiation, asthma. Pt fell 11/15 and broke ribs, being tx'd conservatively. Since the fall, pt has declined in function and per son, also having some memory loss 1-2weeks. MR Head w/ CSF Flow w/ IV Cont (12/27/23) - MRI head revealed Large left cavernous sinus mass likely a meningioma encasing and narrowing the left cavernous internal carotid artery. There is mild hydrocephalus with transependymal flow of CSF with  slightly greater dilatation of the left temporal and occipital horns. No acute infarcts are seen. Cine flow study demonstrates flow between the aqueduct and the fourth ventricle and the posterior fossa and cervical spine. Opthalmology consulted to r/o papilledema- no papilledema noted on exam. 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 Post procedure got vancomycin 4 doses and was placed on ceftriaxone as well as per NSG. Post procedure patient has remained confused AXO 2-->repeat Head CT x 2 latest 1/8 which showed no acute changes and showed stable shunt; this is now her baseline.   Patient to be discharged on cefpodoxime through 1/11 to complete surgical prophylaxis.        Patient should follow-up with his/her ophthalmologist or with [facility] Department of Ophthalmology within 1 week of after discharge at:  [address] [facility]  [place], [place] [address] [phone]           Neuro:  - will need CTH in 2wks 1/18  -Outpt f/u with Dr. Flores thereafter to consider adjusting shunt to 5    Please call Dr. Flores at [phone] to schedule an appointment    Alternative neuro physicians:  Neurology  Dr. Kenneth Ascencio MD.   [address], [place].   [phone].    Neurosurgery   Dr. Claudette Carevr MD.   [address], [place].   [phone].         78F PMhx meningioma s/p radiation, asthma. Pt fell 11/15 and broke ribs, being tx'd conservatively. Since the fall, pt has declined in function and per son, also having some memory loss 1-2weeks. MR Head w/ CSF Flow w/ IV Cont (12/27/23) - MRI head revealed Large left cavernous sinus mass likely a meningioma encasing and narrowing the left cavernous internal carotid artery. There is mild hydrocephalus with transependymal flow of CSF with  slightly greater dilatation of the left temporal and occipital horns. No acute infarcts are seen. Cine flow study demonstrates flow between the aqueduct and the fourth ventricle and the posterior fossa and cervical spine. Opthalmology consulted to r/o papilledema- no papilledema noted on exam. 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 Post procedure got vancomycin 4 doses and was placed on ceftriaxone as well as per NSG. Post procedure patient has remained confused AXO 2-->repeat Head CT x 2 latest 1/8 which showed no acute changes and showed stable shunt; this is now her baseline.   Patient to be discharged on cefpodoxime through 1/11 to complete surgical prophylaxis.        Patient should follow-up with his/her ophthalmologist or with [facility] Department of Ophthalmology within 1 week of after discharge at:  [address] [facility]  [place], [place] [id number] [phone]           Neuro:  - will need CTH in 2wks 1/18  -Outpt f/u with Dr. Flores thereafter to consider adjusting shunt to 5    Please call Dr. Flores at [phone] to schedule an appointment    Alternative neuro physicians:  Neurology  Dr. Kenneth Ascencio MD.   [address] [place], [place].   [phone].    Neurosurgery   Dr. Claudette Carver MD.   [address], [place].   [phone].

## 2023-12-27 NOTE — DISCHARGE NOTE PROVIDER - NSDCFUADDINST_GEN_ALL_CORE_FT
Patient should follow-up with his/her ophthalmologist or with [facility] Department of Ophthalmology within 1 week of after discharge at:    [address] [facility]  [place] [address]  [phone]  Patient can follow-up with any of the following: [x] MRI brain w/o contrast, can be done outpatient    Neurology  Dr. Kenneth Ascencio MD.   [address], [place].   [phone].    Neurosurgery   Dr. Claudette Carver MD.   [address], [place].   [phone].        Patient should follow-up with his/her ophthalmologist or with [facility] Department of Ophthalmology within 1 week of after discharge at:    [address] [facility]  [place] [address]  [phone]  Patient can follow-up with any of the following: [x] MRI brain w/o contrast, can be done outpatient    Neurology  Dr. Kenneth Ascencio MD.   [address], [place].   [phone].    Neurosurgery   Dr. Claudette Carver MD.   [address], [place].   [phone].        Please call for patient's appointments:     Patient should follow-up with his/her ophthalmologist or with [facility] Department of Ophthalmology within 1 week of after discharge at:  [address] [facility]  [place] [address]  [phone]           Neuro:  - will need CTH in 2wks 1/18  -Outpt f/u with Dr. Flores thereafter to consider adjusting shunt to 5    Please call Dr. Flores at [phone] to schedule an appointment    Alternative neuro physicians:  Neurology  Dr. Kenneth Ascencio MD.   [address], [place].   [phone].    Neurosurgery   Dr. Claudette Carver MD.   [address], [place].   [phone].   Please call for patient's appointments:     Patient should follow-up with his/her ophthalmologist or with [facility] Department of Ophthalmology within 1 week of after discharge at:  [address] [facility]  [place] [address]  [phone]           Neuro:  - will need CTH in 2wks 1/18  -Outpt f/u with Dr. Flores thereafter to consider adjusting shunt to 5    Please call Dr. Flores at [phone] to schedule an appointment    Alternative neuro physicians:  Neurology  Dr. Kenneth Ascencio MD.   [address], [place].   [phone].    Neurosurgery   Dr. Claudette Carver MD.   [address], [place].   [phone].

## 2023-12-27 NOTE — DISCHARGE NOTE PROVIDER - NSDCFUADDAPPT_GEN_ALL_CORE_FT
Patient should follow-up with his/her ophthalmologist or with [facility] Department of Ophthalmology within 1 week of after discharge at:    [address] [facility]  [place] [address]  [phone]  Patient can follow-up with any of the following: [x] MRI brain w/o contrast, can be done outpatient    Neurology  Dr. Kenneth Ascencio MD.   [address], [place].   [phone].    Neurosurgery   Dr. Claudette Carver MD.   [address], [place].   [phone].        Patient should follow-up with his/her ophthalmologist or with [facility] Department of Ophthalmology within 1 week of after discharge at:    [address] [facility]  [place] [address]  [phone]  Patient can follow-up with any of the following: [x] MRI brain w/o contrast, can be done outpatient    Neurology  Dr. Kenneth Ascencio MD.   [address], [place].   [phone].    Neurosurgery   Dr. Claudette Carver MD.   [address], [place].   [phone].        See below.

## 2023-12-27 NOTE — DISCHARGE NOTE PROVIDER - NSDCFUSCHEDAPPT_GEN_ALL_CORE_FT
Zaheer Flores Physician [facility]  NEUROSURG [address] [place] [address]  Scheduled Appointment: 01/23/2024     Zaheer Flores Physician [facility]  NEUROSURG [address] [place] [address]  Scheduled Appointment: 01/23/2024

## 2023-12-27 NOTE — PATIENT PROFILE ADULT - FALL HARM RISK - HARM RISK INTERVENTIONS
Assistance with ambulation/Assistance OOB with selected safe patient handling equipment/Communicate Risk of Fall with Harm to all staff/Discuss with provider need for PT consult/Monitor gait and stability/Provide patient with walking aids - walker, cane, crutches/Reinforce activity limits and safety measures with patient and family/Tailored Fall Risk Interventions/Use of alarms - bed, chair and/or voice tab/Visual Cue: Yellow wristband and red socks/Bed in lowest position, wheels locked, appropriate side rails in place/Call bell, personal items and telephone in reach/Instruct patient to call for assistance before getting out of bed or chair/Non-slip footwear when patient is out of bed/[place] to call system/Physically safe environment - no spills, clutter or unnecessary equipment/Purposeful Proactive Rounding/Room/bathroom lighting operational, light cord in reach Assistance with ambulation/Assistance OOB with selected safe patient handling equipment/Communicate Risk of Fall with Harm to all staff/Discuss with provider need for PT consult/Monitor gait and stability/Provide patient with walking aids - walker, cane, crutches/Reinforce activity limits and safety measures with patient and family/Tailored Fall Risk Interventions/Use of alarms - bed, chair and/or voice tab/Visual Cue: Yellow wristband and red socks/Bed in lowest position, wheels locked, appropriate side rails in place/Call bell, personal items and telephone in reach/Instruct patient to call for assistance before getting out of bed or chair/Non-slip footwear when patient is out of bed/[place] to call system/Physically safe environment - no spills, clutter or unnecessary equipment/Purposeful Proactive Rounding/Room/bathroom lighting operational, light cord in reach

## 2023-12-27 NOTE — CHART NOTE - NSCHARTNOTEFT_GEN_A_CORE
78F PMhx  hx meningioma (dx 2015; seen by Dr. Jackson who followed w/imaging; s/p SRS fall 2023) p/w generalized weakness, fatigue.   Seen and examined at bedside. Denies CP, SOB, abd pain, dizziness, lightheadedness.   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 F/u neurosx recs.   -PT pending  D/w ACP  See same day H&P for further details

## 2023-12-27 NOTE — H&P ADULT - NSHPADDITIONALINFOADULT_GEN_ALL_CORE
Fluid: po intake   Electrolytes: replete potassium, phosphorus and magnesium to 4/3/2 respectively  Nutrition: regular   Activity: as tolerated     Preferred contact:  Issac Espinoza [phone]    Med rec:   Done with patient verbally. Fluid: po intake   Electrolytes: replete potassium, phosphorus and magnesium to 4/3/2 respectively  Nutrition: regular   Activity: as tolerated     Preferred contact:  Issac Espinoza [phone]    Med rec:   Done with patient verbally.

## 2023-12-27 NOTE — CONSULT NOTE ADULT - SUBJECTIVE AND OBJECTIVE BOX
p (1480)     HPI: 78F h/o known L parasellar mening dx 2015, following w/ Dr. Jackson, s/p SRS w/ Dr. Coates 8/2023, p/f gen weakness, fatigue, FTT, difficulty walking, mild memory loss, +UTI. 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 Outpatient MRI @[facility] 10/31/23 confirms mening extending into cav sinus, encasing LICA, extending medially to L temp lobe. Stable from MRI 4/2023 and 5/2017. However, L temporal horn appears larger in size, increased in size on MRI 10/2023 from 4/2023.     Exam: Gen weakness, wide awake, Ox3, no drift, L pupil 5R, R 3R, limited L eye upward and medial gaze, no facial, MORAN strongly, SILT    --Anticoagulation:    =====================  PAST MEDICAL HISTORY   Meningioma    Asthma      PAST SURGICAL HISTORY   No significant past surgical history      penicillin (Unknown)      MEDICATIONS:  Antibiotics:  cefTRIAXone   IVPB 1000 milliGRAM(s) IV Intermittent every 24 hours    Neuro:  acetaminophen     Tablet .. 650 milliGRAM(s) Oral every 6 hours PRN  ibuprofen  Tablet. 600 milliGRAM(s) Oral every 6 hours PRN  melatonin 3 milliGRAM(s) Oral at bedtime PRN  ondansetron Injectable 4 milliGRAM(s) IV Push every 8 hours PRN    Other:  aluminum hydroxide/magnesium hydroxide/simethicone Suspension 30 milliLiter(s) Oral every 4 hours PRN  influenza  Vaccine (HIGH DOSE) 0.7 milliLiter(s) IntraMuscular once      SOCIAL HISTORY:   Occupation:   Marital Status:     FAMILY HISTORY:      ROS: Negative except per HPI    LABS:                          12.5   9.76  )-----------( 280      ( 27 Dec 2023 06:41 )             39.6     12-27    143  |  105  |  10  ----------------------------<  85  3.5   |  25  |  0.57    Ca    9.2      27 Dec 2023 06:41  Phos  3.2     12-27  Mg     2.4     12-27    TPro  7.7  /  Alb  4.2  /  TBili  0.2  /  DBili  x   /  AST  18  /  ALT  12  /  AlkPhos  117  12-26

## 2023-12-27 NOTE — H&P ADULT - NSHPPHYSICALEXAM_GEN_ALL_CORE
Vital Signs Last 24 Hrs  T(C): 36.5 (27 Dec 2023 02:58), Max: 36.8 (27 Dec 2023 00:46)  T(F): 97.7 (27 Dec 2023 02:58), Max: 98.3 (27 Dec 2023 00:46)  HR: 62 (27 Dec 2023 02:58) (62 - 76)  BP: 120/69 (27 Dec 2023 02:58) (120/69 - 151/65)  BP(mean): 90 (27 Dec 2023 00:46) (90 - 90)  RR: 18 (27 Dec 2023 02:58) (18 - 18)  SpO2: 98% (27 Dec 2023 02:58) (98% - 99%)    Parameters below as of 27 Dec 2023 02:58  Patient On (Oxygen Delivery Method): room air        CONSTITUTIONAL: Well-groomed, in no apparent distress  EYES: No conjunctival or scleral injection, non-icteric  ENMT: No external nasal lesions; MMM  RESPIRATORY: Breathing comfortably; lungs CTA without wheeze/rhonchi/rales  CARDIOVASCULAR: +S1S2, RRR, no M/G/R; pedal pulses full and symmetric; no lower extremity edema  GASTROINTESTINAL: No tenderness, +BS throughout, no rebound/guarding  SKIN: No rashes or ulcers noted  NEUROLOGIC: No gross focal neurological deficits, AAOX3  PSYCHIATRIC: mood and affect appropriate; appropriate insight and judgment

## 2023-12-27 NOTE — ED ADULT NURSE NOTE - ED CARDIAC RATE
normal

## 2023-12-27 NOTE — CONSULT NOTE ADULT - ASSESSMENT
78F h/o known L parasellar mening dx 2015, following w/ Dr. Jackson, s/p SRS w/ Dr. Coates 8/2023, p/f gen weakness, fatigue, FTT, difficulty walking, mild memory loss, +UTI. 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 Outpatient MRI @[facility] 10/31/23 confirms mening extending into cav sinus, encasing LICA, extending medially to L temp lobe. Stable from MRI 4/2023 and 5/2017. However, L temporal horn appears trapped, increased in size on MRI 10/2023 from 4/2023. Exam: Gen weakness, wide awake, Ox3, no drift, L pupil 5R, R 3R, limited L eye upward and medial gaze, no facial, MORAN strongly, SILT  -Patient and son at bedside would like to continue outpatient f/u with current team for meningioma surveillance/SRS   -Given symptoms and imaging c/f possible trapped L temporal horn increasing in size on outpatient MRIs (though, unclear what would be trapping horn), could consider offering a VPS on this admission if patient/family amenable. However, requires further workup. Please obtain:   -CISS MRI with Cine flow study   -UTI tx per primary, which may also be explaining part of patient's presentation

## 2023-12-27 NOTE — H&P ADULT - HISTORY OF PRESENT ILLNESS
78F PMhx meningioma s/p radiation, asthma. Pt fell 11/15 and broke ribs, being tx'd conservatively. Since the fall, pt has declined in function and per son, also having some memory loss 1-2weeks.   Denied fever, chills, cp, sob, cough, palpitation, abd pain, n/v/d, or significant change in bowel/urinary habits.    In ED, VSS.   CBC and CMP unremarkable. UA was cloudy, with large LE and mod bacteria.   Ordered for CTXin the ED.  78F PMhx meningioma s/p radiation, asthma. Pt fell 11/15 and broke ribs, being tx'd conservatively. Since the fall, pt has declined in function and per son, also having some memory loss 1-2weeks. Of note, pt was rx'd a short-course of oxycodone for pain but did not take it.   Denied fever, chills, cp, sob, cough, palpitation, abd pain, n/v/d, or significant change in bowel/urinary habits.    In ED, VSS.   CBC and CMP unremarkable. UA was cloudy, with large LE and mod bacteria.   Ordered for CTX in the ED.  78F PMhx meningioma s/p radiation, asthma. Pt fell 11/15 and broke ribs, being tx'd conservatively. Since the fall, pt has declined in function and per son, also having some memory loss 1-2weeks. Of note, pt was rx'd a short-course of oxycodone for pain but did not take it; instead, she's been taking tylenol and motrin.   She still has some pain.   Denied fever, chills, cp, sob, cough, palpitation, abd pain, n/v/d, or significant change in bowel/urinary habits.    In ED, VSS.   CBC and CMP unremarkable. UA was cloudy, with large LE and mod bacteria.   Ordered for CTX in the ED.

## 2023-12-27 NOTE — PHYSICAL THERAPY INITIAL EVALUATION ADULT - PERTINENT HX OF CURRENT PROBLEM, REHAB EVAL
78-year-old female with a past medical history of meningioma presenting with worsening weakness. Son at bedside states that the patient has had worsening walking over the last 2 weeks with some memory deficits. dx: Weakness - CTH w/ 3.0 cm left parasellar meningioma, admit for rehab, placement UTI - CTX x 3 days Closed rib fracture.c/w tylenol/motrin for pain control also lidocaine patch Meningioma CT head w/o vasogenic edema c/w outpatient follow up.

## 2023-12-27 NOTE — H&P ADULT - PROBLEM SELECTOR PLAN 4
VTE ppx: scd  GI ppx: n/i -known meningioma. CT head w/o vasogenic edema.   -c/w outpatient follow up

## 2023-12-27 NOTE — ED ADULT NURSE NOTE - NSFALLRISKINTERV_ED_ALL_ED
Assistance OOB with selected safe patient handling equipment if applicable/Assistance with ambulation/Communicate fall risk and risk factors to all staff, patient, and family/Provide visual cue: yellow wristband, yellow gown, etc/Reinforce activity limits and safety measures with patient and family/Call bell, personal items and telephone in reach/Instruct patient to call for assistance before getting out of bed/chair/stretcher/Non-slip footwear applied when patient is off stretcher/[place] to call system/Physically safe environment - no spills, clutter or unnecessary equipment/Purposeful Proactive Rounding/Room/bathroom lighting operational, light cord in reach Assistance OOB with selected safe patient handling equipment if applicable/Assistance with ambulation/Communicate fall risk and risk factors to all staff, patient, and family/Provide visual cue: yellow wristband, yellow gown, etc/Reinforce activity limits and safety measures with patient and family/Call bell, personal items and telephone in reach/Instruct patient to call for assistance before getting out of bed/chair/stretcher/Non-slip footwear applied when patient is off stretcher/[place] to call system/Physically safe environment - no spills, clutter or unnecessary equipment/Purposeful Proactive Rounding/Room/bathroom lighting operational, light cord in reach

## 2023-12-27 NOTE — ED ADULT NURSE NOTE - OBJECTIVE STATEMENT
Pt is a 78 y.o female c.o weakness. PT is A&Ox3 resting in stretcher. Pt endorsing weakness. Pt is a 78 y.o female c.o weakness. PT is A&Ox3 resting in stretcher. Pt endorsing weakness. PT states she had a recent fall on 11/15 resulting in rib fx. S/p fall, pt endorsing increasing weakness, lethargy and as per pt son memory loss. Pt has a hx of meingioma s/p radiation. Spontaneously breathing on RA>95%, CM NSR, skin dry and intact, deformity noted to RT knee. Pt denies any fever, N/V/D, Cp, SOB, palpitation, or hematuria. Safety and comfort measures in place bed in lowest position, siderails upright and blanket given.

## 2023-12-27 NOTE — H&P ADULT - NSHPLABSRESULTS_GEN_ALL_CORE
13.0   9.33  )-----------( 308      ( 26 Dec 2023 20:56 )             40.5           141  |  106  |  11  ----------------------------<  101<H>  3.6   |  23  |  0.53    Ca    9.5      26 Dec 2023 20:56    TPro  7.7  /  Alb  4.2  /  TBili  0.2  /  DBili  x   /  AST  18  /  ALT  12  /  AlkPhos  117                Urinalysis Basic - ( 27 Dec 2023 00:47 )    Color: Yellow / Appearance: Cloudy / S.022 / pH: x  Gluc: x / Ketone: 15 mg/dL  / Bili: Negative / Urobili: 0.2 mg/dL   Blood: x / Protein: Trace mg/dL / Nitrite: Negative   Leuk Esterase: Large / RBC: 2 /HPF /  /HPF   Sq Epi: x / Non Sq Epi: 3 /HPF / Bacteria: Moderate /HPF                  CAPILLARY BLOOD GLUCOSE

## 2023-12-27 NOTE — DISCHARGE NOTE PROVIDER - NSDCMRMEDTOKEN_GEN_ALL_CORE_FT
NALOXONE HYDROCHLORIDE  4 MG/0.1ML LIQD:   OXYCODONE HYDROCHLORIDE  5 MG TABS:    cefpodoxime 200 mg oral tablet: 2 tab(s) orally every 12 hours through 1/11/24 - surgical prophylaxis

## 2023-12-27 NOTE — PATIENT PROFILE ADULT - FUNCTIONAL ASSESSMENT - BASIC MOBILITY 6.
2-calculated by average/Not able to assess (calculate score using [facility] averaging method)  2-calculated by average/Not able to assess (calculate score using [facility] averaging method)

## 2023-12-27 NOTE — DISCHARGE NOTE PROVIDER - NSDCCPCAREPLAN_GEN_ALL_CORE_FT
PRINCIPAL DISCHARGE DIAGNOSIS  Diagnosis: S/P  shunt  Assessment and Plan of Treatment: Goals: gain strength at rehab, remain infection free

## 2023-12-27 NOTE — PHYSICAL THERAPY INITIAL EVALUATION ADULT - NSPTDISCHREC_GEN_A_CORE
If pt d/c home would require home PT, assist with all mobility, & DME: RW, & 3:1 commode./[facility]

## 2023-12-27 NOTE — H&P ADULT - PROBLEM SELECTOR PLAN 3
-known meningioma. CT head w/o vasogenic edema.   -c/w outpatient follow up -c/w tylenol and motrin for pain control. also lidocaine patch  -incentive spirometer ordered

## 2023-12-28 LAB
ANION GAP SERPL CALC-SCNC: 11 MMOL/L — SIGNIFICANT CHANGE UP (ref 5–17)
ANION GAP SERPL CALC-SCNC: 11 MMOL/L — SIGNIFICANT CHANGE UP (ref 5–17)
BUN SERPL-MCNC: 10 MG/DL — SIGNIFICANT CHANGE UP (ref 7–23)
BUN SERPL-MCNC: 10 MG/DL — SIGNIFICANT CHANGE UP (ref 7–23)
CALCIUM SERPL-MCNC: 9.2 MG/DL — SIGNIFICANT CHANGE UP (ref 8.4–10.5)
CALCIUM SERPL-MCNC: 9.2 MG/DL — SIGNIFICANT CHANGE UP (ref 8.4–10.5)
CHLORIDE SERPL-SCNC: 106 MMOL/L — SIGNIFICANT CHANGE UP (ref 96–108)
CHLORIDE SERPL-SCNC: 106 MMOL/L — SIGNIFICANT CHANGE UP (ref 96–108)
CO2 SERPL-SCNC: 23 MMOL/L — SIGNIFICANT CHANGE UP (ref 22–31)
CO2 SERPL-SCNC: 23 MMOL/L — SIGNIFICANT CHANGE UP (ref 22–31)
CREAT SERPL-MCNC: 0.58 MG/DL — SIGNIFICANT CHANGE UP (ref 0.5–1.3)
CREAT SERPL-MCNC: 0.58 MG/DL — SIGNIFICANT CHANGE UP (ref 0.5–1.3)
CULTURE RESULTS: SIGNIFICANT CHANGE UP
CULTURE RESULTS: SIGNIFICANT CHANGE UP
EGFR: 94 ML/MIN/1.73M2 — SIGNIFICANT CHANGE UP
EGFR: 94 ML/MIN/1.73M2 — SIGNIFICANT CHANGE UP
GLUCOSE SERPL-MCNC: 86 MG/DL — SIGNIFICANT CHANGE UP (ref 70–99)
GLUCOSE SERPL-MCNC: 86 MG/DL — SIGNIFICANT CHANGE UP (ref 70–99)
HCT VFR BLD CALC: 39.3 % — SIGNIFICANT CHANGE UP (ref 34.5–45)
HCT VFR BLD CALC: 39.3 % — SIGNIFICANT CHANGE UP (ref 34.5–45)
HCV AB S/CO SERPL IA: 0.06 S/CO — SIGNIFICANT CHANGE UP (ref 0–0.99)
HCV AB S/CO SERPL IA: 0.06 S/CO — SIGNIFICANT CHANGE UP (ref 0–0.99)
HCV AB SERPL-IMP: SIGNIFICANT CHANGE UP
HCV AB SERPL-IMP: SIGNIFICANT CHANGE UP
HGB BLD-MCNC: 12.7 G/DL — SIGNIFICANT CHANGE UP (ref 11.5–15.5)
HGB BLD-MCNC: 12.7 G/DL — SIGNIFICANT CHANGE UP (ref 11.5–15.5)
MCHC RBC-ENTMCNC: 26 PG — LOW (ref 27–34)
MCHC RBC-ENTMCNC: 26 PG — LOW (ref 27–34)
MCHC RBC-ENTMCNC: 32.3 GM/DL — SIGNIFICANT CHANGE UP (ref 32–36)
MCHC RBC-ENTMCNC: 32.3 GM/DL — SIGNIFICANT CHANGE UP (ref 32–36)
MCV RBC AUTO: 80.5 FL — SIGNIFICANT CHANGE UP (ref 80–100)
MCV RBC AUTO: 80.5 FL — SIGNIFICANT CHANGE UP (ref 80–100)
NRBC # BLD: 0 /100 WBCS — SIGNIFICANT CHANGE UP (ref 0–0)
NRBC # BLD: 0 /100 WBCS — SIGNIFICANT CHANGE UP (ref 0–0)
PLATELET # BLD AUTO: 294 K/UL — SIGNIFICANT CHANGE UP (ref 150–400)
PLATELET # BLD AUTO: 294 K/UL — SIGNIFICANT CHANGE UP (ref 150–400)
POTASSIUM SERPL-MCNC: 3.6 MMOL/L — SIGNIFICANT CHANGE UP (ref 3.5–5.3)
POTASSIUM SERPL-MCNC: 3.6 MMOL/L — SIGNIFICANT CHANGE UP (ref 3.5–5.3)
POTASSIUM SERPL-SCNC: 3.6 MMOL/L — SIGNIFICANT CHANGE UP (ref 3.5–5.3)
POTASSIUM SERPL-SCNC: 3.6 MMOL/L — SIGNIFICANT CHANGE UP (ref 3.5–5.3)
RBC # BLD: 4.88 M/UL — SIGNIFICANT CHANGE UP (ref 3.8–5.2)
RBC # BLD: 4.88 M/UL — SIGNIFICANT CHANGE UP (ref 3.8–5.2)
RBC # FLD: 14.8 % — HIGH (ref 10.3–14.5)
RBC # FLD: 14.8 % — HIGH (ref 10.3–14.5)
SODIUM SERPL-SCNC: 140 MMOL/L — SIGNIFICANT CHANGE UP (ref 135–145)
SODIUM SERPL-SCNC: 140 MMOL/L — SIGNIFICANT CHANGE UP (ref 135–145)
SPECIMEN SOURCE: SIGNIFICANT CHANGE UP
SPECIMEN SOURCE: SIGNIFICANT CHANGE UP
WBC # BLD: 8.35 K/UL — SIGNIFICANT CHANGE UP (ref 3.8–10.5)
WBC # BLD: 8.35 K/UL — SIGNIFICANT CHANGE UP (ref 3.8–10.5)
WBC # FLD AUTO: 8.35 K/UL — SIGNIFICANT CHANGE UP (ref 3.8–10.5)
WBC # FLD AUTO: 8.35 K/UL — SIGNIFICANT CHANGE UP (ref 3.8–10.5)

## 2023-12-28 PROCEDURE — 99221 1ST HOSP IP/OBS SF/LOW 40: CPT

## 2023-12-28 PROCEDURE — 99233 SBSQ HOSP IP/OBS HIGH 50: CPT

## 2023-12-28 RX ADMIN — LIDOCAINE 1 PATCH: 4 CREAM TOPICAL at 03:50

## 2023-12-28 RX ADMIN — Medication 3 MILLIGRAM(S): at 22:07

## 2023-12-28 RX ADMIN — LIDOCAINE 1 PATCH: 4 CREAM TOPICAL at 15:00

## 2023-12-28 RX ADMIN — CEFTRIAXONE 100 MILLIGRAM(S): 500 INJECTION, POWDER, FOR SOLUTION INTRAMUSCULAR; INTRAVENOUS at 00:57

## 2023-12-28 RX ADMIN — Medication 1 DROP(S): at 17:33

## 2023-12-28 RX ADMIN — LIDOCAINE 1 PATCH: 4 CREAM TOPICAL at 08:24

## 2023-12-28 NOTE — PROGRESS NOTE ADULT - SUBJECTIVE AND OBJECTIVE BOX
Patient seen and examined at bedside.    --Anticoagulation--    T(C): 36.8 (12-28-23 @ 05:19), Max: 37.1 (12-27-23 @ 12:10)  HR: 70 (12-28-23 @ 05:19) (67 - 92)  BP: 130/79 (12-28-23 @ 05:19) (101/55 - 130/79)  RR: 17 (12-28-23 @ 05:19) (17 - 18)  SpO2: 94% (12-28-23 @ 05:19) (93% - 97%)  Wt(kg): --    Exam: Gen weakness, wide awake, Ox3, no drift, L pupil 5R, R 4R, limited L eye upward and medial gaze, no facial, MORAN strongly, SILT

## 2023-12-28 NOTE — PROGRESS NOTE ADULT - ASSESSMENT
78F h/o known L parasellar mening dx 2015, following w/ Dr. Jackson, s/p SRS w/ Dr. Coates 8/2023, p/f gen weakness, fatigue, FTT, difficulty walking, mild memory loss, +UTI. 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 Outpatient MRI @[facility] 10/31/23 confirms mening extending into cav sinus, encasing LICA, extending medially to L temp lobe. Stable from MRI 4/2023 and 5/2017. However, L temporal horn appears trapped, increased in size on MRI 10/2023 from 4/2023. Exam: Gen weakness, wide awake, Ox3, no drift, L pupil 5R, R 3R, limited L eye upward and medial gaze, no facial, MORAN strongly, SILT  -Patient and son at bedside would like to continue outpatient f/u with current team for meningioma surveillance/SRS   -Given Sx and imaging c/f possible trapped L temporal horn increasing in size on outpatient MRIs (though, unclear what would be trapping horn), could consider offering a VPS on this admission if patient/family amenable. However, requires further workup. Please obtain:   -CISS MRI with Cine flow study done, f/u read  -UTI tx per primary, which may also be explaining part of patient's presentation  78F h/o known L parasellar mening dx 2015, following w/ Dr. Jackson, s/p SRS w/ Dr. Coates 8/2023, p/f gen weakness, fatigue, FTT, difficulty walking, mild memory loss, +UTI. 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 Outpatient MRI @[facility] 10/31/23 confirms mening extending into cav sinus, encasing LICA, extending medially to L temp lobe. Stable from MRI 4/2023 and 5/2017. However, L temporal horn appears trapped, increased in size on MRI 10/2023 from 4/2023. Exam: Gen weakness, wide awake, Ox3, no drift, L pupil 5R, R 3R, limited L eye upward and medial gaze, no facial, MORAN strongly, SILT  -Patient and son at bedside would like to continue outpatient f/u with current team for meningioma surveillance/SRS   -Given Sx and imaging c/f possible trapped L temporal horn increasing in size on outpatient MRIs (though, unclear what would be trapping horn), could consider offering a VPS on this admission if patient/family amenable. However, requires further workup. Please obtain:   -CISS MRI with Cine flow study done, f/u read  -Please have ophtho see to eval for papilledema   -UTI tx per primary, which may also be explaining part of patient's presentation

## 2023-12-28 NOTE — PROGRESS NOTE ADULT - SUBJECTIVE AND OBJECTIVE BOX
Patient is a 78y old  Female who presents with a chief complaint of     SUBJECTIVE / OVERNIGHT EVENTS:     78F PMhx meningioma s/p radiation, asthma. Pt fell 11/15 and broke ribs, being tx'd conservatively. Since the fall, pt has declined in function and per son, also having some memory loss 1-2weeks. Of note, pt was rx'd a short-course of oxycodone for pain but did not take it; instead, she's been taking tylenol and motrin.     In ED, VSS.   CBC and CMP unremarkable. UA was cloudy, with large LE and mod bacteria.   Ordered for CTX in the ED.   Patient was seen by the neurosurgery team .  Patient was admitted for further evaluation.       ADDITIONAL REVIEW OF SYSTEMS: Negative except for above    MEDICATIONS  (STANDING):  cefTRIAXone   IVPB 1000 milliGRAM(s) IV Intermittent every 24 hours  influenza  Vaccine (HIGH DOSE) 0.7 milliLiter(s) IntraMuscular once  lidocaine   4% Patch 1 Patch Transdermal every 24 hours    MEDICATIONS  (PRN):  acetaminophen     Tablet .. 650 milliGRAM(s) Oral every 6 hours PRN Temp greater or equal to 38C (100.4F), Mild Pain (1 - 3)  aluminum hydroxide/magnesium hydroxide/simethicone Suspension 30 milliLiter(s) Oral every 4 hours PRN Dyspepsia  ibuprofen  Tablet. 600 milliGRAM(s) Oral every 6 hours PRN Moderate Pain (4 - 6)  melatonin 3 milliGRAM(s) Oral at bedtime PRN Insomnia  ondansetron Injectable 4 milliGRAM(s) IV Push every 8 hours PRN Nausea and/or Vomiting      CAPILLARY BLOOD GLUCOSE        I&O's Summary    27 Dec 2023 07:01  -  28 Dec 2023 07:00  --------------------------------------------------------  IN: 50 mL / OUT: 0 mL / NET: 50 mL    28 Dec 2023 07:01  -  28 Dec 2023 13:06  --------------------------------------------------------  IN: 237 mL / OUT: 150 mL / NET: 87 mL        PHYSICAL EXAM:  Vital Signs Last 24 Hrs  T(C): 36.7 (28 Dec 2023 13:06), Max: 36.8 (27 Dec 2023 14:28)  T(F): 98.1 (28 Dec 2023 13:06), Max: 98.3 (27 Dec 2023 14:28)  HR: 76 (28 Dec 2023 13:06) (67 - 84)  BP: 128/59 (28 Dec 2023 13:06) (112/62 - 130/79)  BP(mean): --  RR: 17 (28 Dec 2023 13:06) (17 - 18)  SpO2: 95% (28 Dec 2023 13:06) (93% - 97%)    Parameters below as of 28 Dec 2023 13:06  Patient On (Oxygen Delivery Method): room air        PHYSICAL EXAM:  GENERAL: NAD, well-developed  HEAD:  Atraumatic, Normocephalic  EYES:  conjunctiva and sclera clear  NECK: Supple, No JVD  CHEST/LUNG: Clear to auscultation bilaterally; No wheeze  HEART: Regular rate and rhythm; No murmurs, rubs, or gallops  ABDOMEN: Soft, Nontender, Nondistended; Bowel sounds present  EXTREMITIES:  2+ Peripheral Pulses, No clubbing, cyanosis, or edema  PSYCH: AAOx3  NEUROLOGY: non-focal      LABS:                        12.7   8.35  )-----------( 294      ( 28 Dec 2023 07:08 )             39.3     12-28    140  |  106  |  10  ----------------------------<  86  3.6   |  23  |  0.58    Ca    9.2      28 Dec 2023 07:38  Phos  3.2     12-27  Mg     2.4     12-27    TPro  7.7  /  Alb  4.2  /  TBili  0.2  /  DBili  x   /  AST  18  /  ALT  12  /  AlkPhos  117  12-26          Urinalysis Basic - ( 28 Dec 2023 07:38 )    Color: x / Appearance: x / SG: x / pH: x  Gluc: 86 mg/dL / Ketone: x  / Bili: x / Urobili: x   Blood: x / Protein: x / Nitrite: x   Leuk Esterase: x / RBC: x / WBC x   Sq Epi: x / Non Sq Epi: x / Bacteria: x        Culture - Urine (collected 27 Dec 2023 00:48)  Source: Clean Catch Clean Catch (Midstream)  Final Report (28 Dec 2023 00:40):    >=3 organisms. Probable collection contamination.        RADIOLOGY & ADDITIONAL TESTS:    Imaging Personally Reviewed:    Electrocardiogram Personally Reviewed:    COORDINATION OF CARE:  Care Discussed with Consultants/Other Providers [Y/N]:  Prior or Outpatient Records Reviewed [Y/N]:

## 2023-12-28 NOTE — CONSULT NOTE ADULT - SUBJECTIVE AND OBJECTIVE BOX
[facility] DEPARTMENT OF OPHTHALMOLOGY - INITIAL ADULT CONSULT  -----------------------------------------------------------------------------------------------------------------  Zaheer Li MD, PGY3  Available on [employer] Teams  -----------------------------------------------------------------------------------------------------------------    HPI:  78F PMhx meningioma s/p radiation, asthma. Pt fell 11/15 and broke ribs, being tx'd conservatively. Since the fall, pt has declined in function and per son, also having some memory loss 1-2weeks. Of note, pt was rx'd a short-course of oxycodone for pain but did not take it; instead, she's been taking tylenol and motrin.   She still has some pain.   Denied fever, chills, cp, sob, cough, palpitation, abd pain, n/v/d, or significant change in bowel/urinary habits.    In ED, VSS.   CBC and CMP unremarkable. UA was cloudy, with large LE and mod bacteria.   Ordered for CTX in the ED.  (27 Dec 2023 01:43)    Interval History: patient has had imaging since admission and being followed by neurosurgery. Large left cavernous sinus mass likely a meningioma encasing and narrowing the left cavernous internal carotid artery. There is mild hydrocephalus with transependymal flow of CSF with slightly greater dilatation of the left temporal and occipital horns. No acute infarcts are seen. Cine flow study demonstrates flow between the aqueduct and the fourth ventricle and the posterior fossa and cervical spine. Patient mental status fluctuates slightly, struggles to remember things. Denies any overt visual complaints other than mild blurry vision. States that she is not seeing double. Son does state she complains of itching and dryness    Past Medical History: meningioma s/p radiation, asthma  Past Ocular History: denies  Drops: none  Medications: see primary note  Allergies: penicillin  Family History: denies  Surgical History: no eye surgeries  Outpatient Ophthalmologist: none      Review of Systems:  Constitutional: No fever, chills  Eyes: +blurry vision OU, denies flashes, floaters, FBS, erythema, discharge, double vision, OU  Neuro: No tremors  Cardiovascular: No chest pain, palpitations  Respiratory: No SOB, no cough  GI: No nausea, vomiting, abdominal pain    Vital Signs: T(C): 36.7 (12-28-23 @ 13:06)  T(F): 98.1 (12-28-23 @ 13:06), Max: 98.3 (12-28-23 @ 05:19)  HR: 76 (12-28-23 @ 13:06) (67 - 78)  BP: 128/59 (12-28-23 @ 13:06) (112/62 - 130/79)  RR:  (17 - 18)  SpO2:  (94% - 97%)  Wt(kg): --  AAOx3, memory fluctuates but appears to comprehend and follow all directions    Ophthalmology Exam:  Visual acuity (cc): 20/50 OD PHNI. 20/50 OS PHNI.  Pupils: right pupil 5 mm in dark, 3 mm in light; left pupil 6mm in dark, 5 mm in light, sluggish  Intraocular Pressure:  Ttono 10 OD. 12 OS  Extraocular movements (EOMs): right appears full, left with -1 adduction, -2 abduction, -2 supraduction, -1 infraduction  Confrontational Visual Field (CVF): Full OD. Full OS    Pen Light Exam (PLE)  External: Normal OU.  Lids/Lashes/Lacrimal Ducts: Flat OD. mild ptosis OS  Sclera/Conjunctiva: White and quiet OU.  Cornea: DTBUT OU  Anterior Chamber: Deep and formed OU.    Iris: Flat OU.  Lens: NS OU.    Fundus Exam: dilated with 1% tropicamide and 2.5% phenylephrine  Approval obtained from primary team for dilation  Patient aware that pupils can remained dilated for at least 4-6 hours.  Exam performed with 20 D lens    Vitreous: wnl OU  Disc, cup/disc: sharp and pink, 0.3 OU  Macula: wnl OU  Vessels: wnl OU  Periphery: wnl OU    Labs/Imaging:  < from: MR Head w/ CSF Flow, w/ IV Cont (12.27.23 @ 23:13) >  Ventricular enlargement is identified with mild transependymal flow of   CSF suggesting acute hydrocephalus. The left temporal and occipital horns   are dilated greater than the right which may be due to the left cavernous   sinus mass. There is flow through the aqueduct of Sylvius and fourth   ventricle as well as flow between the ventral and dorsal intracranial   subarachnoid space and the cervical subarachnoid space on cine flow study.    There is a large left cavernous sinus mass extending along the dura into   the left middle cranial fossa measuring measuring 3.1 cm in AP diameter   by 3.2 cm transversely by 3.4 cm in craniocaudal diameter. There is mild   mass effect on the left optic nerve and the left optic chiasm. There is   also extension into the sella. This likely represents a meningioma. There   is encasement and narrowing of the left cavernous internal carotid artery.    No acute infarcts are identified.    IMPRESSION: Large left cavernous sinus masslikely a meningioma encasing   and narrowing the left cavernous internal carotid artery. There is mild   hydrocephalus with transependymal flow of CSF with  slightly greater   dilatation of the left temporal and occipital horns. No acute infarcts   are seen. Cine flow study demonstrates flow between the aqueduct and the   fourth ventricle and the posterior fossa and cervical spine.    < end of copied text >    < from: CT Head No Cont (12.26.23 @ 20:50) >  No paranasal sinus air-fluid levels or opacification. Bilateral mastoid   air cells and middle ear regions well-aerated. Left greater than right   temporomandibular arthrosis. No aggressive calvarial lesion.    Approximate 3.0 x 2.9 x 2.6 cm hypodense probable extra-axial mass   centered in the left parasellar region, demonstrating probable invasion   of the ipsilateral cavernous sinus and sella turcica, mass effect upon   the left medial temporal lobe and overlying ipsilateral frontal lobe,   without gross vasogenic edema, likely encasing the left internal carotid   artery.    Moderate parietal atrophy with confluent periventricular hypoattenuation;   a nonspecific finding which statistically reflects chronic microvascular   ischemic change. No definitive evidence of territorial infarct or   hemorrhage.    IMPRESSION:  1. 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  2. No evidence of territorial infarct or hemorrhage.  3. Additional findings above.    < end of copied text >     [facility] DEPARTMENT OF OPHTHALMOLOGY - INITIAL ADULT CONSULT  -----------------------------------------------------------------------------------------------------------------  Zaheer Li MD, PGY3  Available on [employer] Teams  -----------------------------------------------------------------------------------------------------------------    HPI:  78F PMhx meningioma s/p radiation, asthma. Pt fell 11/15 and broke ribs, being tx'd conservatively. Since the fall, pt has declined in function and per son, also having some memory loss 1-2weeks. Of note, pt was rx'd a short-course of oxycodone for pain but did not take it; instead, she's been taking tylenol and motrin.   She still has some pain.   Denied fever, chills, cp, sob, cough, palpitation, abd pain, n/v/d, or significant change in bowel/urinary habits.    In ED, VSS.   CBC and CMP unremarkable. UA was cloudy, with large LE and mod bacteria.   Ordered for CTX in the ED.  (27 Dec 2023 01:43)    Interval History: patient has had imaging since admission and being followed by neurosurgery. Large left cavernous sinus mass likely a meningioma encasing and narrowing the left cavernous internal carotid artery. There is mild hydrocephalus with transependymal flow of CSF with slightly greater dilatation of the left temporal and occipital horns. No acute infarcts are seen. Cine flow study demonstrates flow between the aqueduct and the fourth ventricle and the posterior fossa and cervical spine. Patient mental status fluctuates slightly, struggles to remember things. Denies any overt visual complaints other than mild blurry vision. States that she is not seeing double. Son does state she complains of itching and dryness    Past Medical History: meningioma s/p radiation, asthma  Past Ocular History: denies  Drops: none  Medications: see primary note  Allergies: penicillin  Family History: denies  Surgical History: no eye surgeries  Outpatient Ophthalmologist: none      Review of Systems:  Constitutional: No fever, chills  Eyes: +blurry vision OU, denies flashes, floaters, FBS, erythema, discharge, double vision, OU  Neuro: No tremors  Cardiovascular: No chest pain, palpitations  Respiratory: No SOB, no cough  GI: No nausea, vomiting, abdominal pain    Vital Signs: T(C): 36.7 (12-28-23 @ 13:06)  T(F): 98.1 (12-28-23 @ 13:06), Max: 98.3 (12-28-23 @ 05:19)  HR: 76 (12-28-23 @ 13:06) (67 - 78)  BP: 128/59 (12-28-23 @ 13:06) (112/62 - 130/79)  RR:  (17 - 18)  SpO2:  (94% - 97%)  Wt(kg): --  AAOx3, memory fluctuates but appears to comprehend and follow all directions    Ophthalmology Exam:  Visual acuity (cc): 20/50 OD PHNI. 20/50 OS PHNI.  Pupils: right pupil 5 mm in dark, 3 mm in light; left pupil 6mm in dark, 5 mm in light, sluggish  Intraocular Pressure:  Ttono 10 OD. 12 OS  Extraocular movements (EOMs): right appears full, left with -1 adduction, -2 abduction, -2 supraduction, -1 infraduction  Confrontational Visual Field (CVF): Full OD. Full OS    Pen Light Exam (PLE)  External: Normal OU.  Lids/Lashes/Lacrimal Ducts: Flat OD. mild ptosis OS  Sclera/Conjunctiva: White and quiet OU.  Cornea: DTBUT OU  Anterior Chamber: Deep and formed OU.    Iris: Flat OU.  Lens: NS OU.    Fundus Exam: dilated with 1% tropicamide and 2.5% phenylephrine  Approval obtained from primary team for dilation  Patient aware that pupils can remained dilated for at least 4-6 hours.  Exam performed with 20 D lens    Vitreous: wnl OU  Disc, cup/disc: sharp and pink, 0.3 OU  Macula: wnl OU  Vessels: wnl OU  Periphery: wnl OU    Labs/Imaging:  < from: MR Head w/ CSF Flow, w/ IV Cont (12.27.23 @ 23:13) >  Ventricular enlargement is identified with mild transependymal flow of   CSF suggesting acute hydrocephalus. The left temporal and occipital horns   are dilated greater than the right which may be due to the left cavernous   sinus mass. There is flow through the aqueduct of Sylvius and fourth   ventricle as well as flow between the ventral and dorsal intracranial   subarachnoid space and the cervical subarachnoid space on cine flow study.    There is a large left cavernous sinus mass extending along the dura into   the left middle cranial fossa measuring measuring 3.1 cm in AP diameter   by 3.2 cm transversely by 3.4 cm in craniocaudal diameter. There is mild   mass effect on the left optic nerve and the left optic chiasm. There is   also extension into the sella. This likely represents a meningioma. There   is encasement and narrowing of the left cavernous internal carotid artery.    No acute infarcts are identified.    IMPRESSION: Large left cavernous sinus masslikely a meningioma encasing   and narrowing the left cavernous internal carotid artery. There is mild   hydrocephalus with transependymal flow of CSF with  slightly greater   dilatation of the left temporal and occipital horns. No acute infarcts   are seen. Cine flow study demonstrates flow between the aqueduct and the   fourth ventricle and the posterior fossa and cervical spine.    < end of copied text >    < from: CT Head No Cont (12.26.23 @ 20:50) >  No paranasal sinus air-fluid levels or opacification. Bilateral mastoid   air cells and middle ear regions well-aerated. Left greater than right   temporomandibular arthrosis. No aggressive calvarial lesion.    Approximate 3.0 x 2.9 x 2.6 cm hypodense probable extra-axial mass   centered in the left parasellar region, demonstrating probable invasion   of the ipsilateral cavernous sinus and sella turcica, mass effect upon   the left medial temporal lobe and overlying ipsilateral frontal lobe,   without gross vasogenic edema, likely encasing the left internal carotid   artery.    Moderate parietal atrophy with confluent periventricular hypoattenuation;   a nonspecific finding which statistically reflects chronic microvascular   ischemic change. No definitive evidence of territorial infarct or   hemorrhage.    IMPRESSION:  1. 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  2. No evidence of territorial infarct or hemorrhage.  3. Additional findings above.    < end of copied text >

## 2023-12-28 NOTE — PROGRESS NOTE ADULT - PROBLEM SELECTOR PLAN 4
-known meningioma. CT head w/o vasogenic edema.   - neurosurgery team has seen pt who recommended MRI of brain and OPHTHALMOLOGY consult for papilledema

## 2023-12-28 NOTE — CHART NOTE - NSCHARTNOTEFT_GEN_A_CORE
Imaging reviewed with Neuro-radiologist, Dr. Herring. Her impression was unlikely NPH because you do not see transependymal flow of CSF, since pressure is normal in NPH. Likely this mild hydrocephalus is in relation to large left cavernous sinus mass.    Normal pressure Hydrocephalus is diagnosed as an outpatient. If primary team or other wishes to pursue this diagnosis it can be done through the referrals below.    Recommendations:  [x] MRI brain w/o contrast, can be done inpatient vs. outpatient  [] Refer to outpatient Neurology for NPH diagnostic evaluation, where patient can have formal neuropsychological assessment as well as high-volume lumbar puncture with timed pre-LP and post-LP cognitive and gait assessment.    Patient can follow-up with any of the following:    Neurology  Dr. Kenneth Ascencio MD.   [address], [place].   [phone].    Neurosurgery   Dr. Claudette Carver MD.   [address], [place].   [phone].    Case and plan above discussed with primary team.  Please do not hesitate to call back with any issues, questions, or concerns. Imaging reviewed with Neuro-radiologist, Dr. Herring. Her impression was unlikely NPH because you do not see transependymal flow of CSF, since pressure is normal in NPH. Likely this mild hydrocephalus is in relation to large left cavernous sinus mass.    Normal pressure Hydrocephalus is diagnosed as an outpatient. If primary team or other wishes to pursue this diagnosis it can be done through the referrals below.    Recommendations:  [x] MRI brain w/o contrast, can be done inpatient vs. outpatient  [] Refer to outpatient Neurology for NPH diagnostic evaluation, where patient can have formal neuropsychological assessment as well as high-volume lumbar puncture with timed pre-LP and post-LP cognitive and gait assessment.    Patient can follow-up with any of the following:    Neurology  Dr. Kenneth Ascencio MD.   [address], [place].   [phone].    Neurosurgery   Dr. Claudette Carver MD.   [address], [place].   [phone].    Case and plan above discussed with primary team.  Please do not hesitate to call back with any issues, questions, or concerns.

## 2023-12-28 NOTE — CONSULT NOTE ADULT - ATTENDING COMMENTS
78 year old female with history of known L parasellar mening dx 2015 treated with SRS. Stable in size.  Presents with gait difficulty and mild memory loss. Found to have UTI. MRI brain shows enlarging ventricular size since 2019, slightly larger left temporal horn compared to other ventricles, but all are enlarged compared to prior.  -CISS MRI with Cine flow study to rule out obstructive hydrocephalus/trapping of left temporal horn  - opthalmology consult to rule out papilledema which would be suggestive of obstructive hydrocephalus.  - Neurology consult to evaluate for Normal Pressure Hydrocephalus  -UTI tx per primary, which may also be explaining part of patient's presentation
78y female with a past medical history/ocular history of L parasellar meningioma, memory loss, now with meningioma growing and invading cavernous sinus consulted for r/o papilledema, found to have EOM restrictions and abnormal pupil with ptosis due to cavernous sinus invasion, with no evidence of optic nerve head edema.      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 No evidence of disc edema OU. appreciate management per primary team

## 2023-12-28 NOTE — CONSULT NOTE ADULT - ASSESSMENT
Assessment and Recommendations:  78y female with a past medical history/ocular history of L parasellar meningioma, memory loss, now with meningioma growing and invading cavernous sinus consulted for r/o papilledema, found to have EOM restrictions and abnormal pupil with ptosis due to cavernous sinus invasion, with no evidence of optic nerve head edema.    # Headaches, papilledema r/o  -Denies TVO, Denies tinnitus, diplopia, new medications inc abx or skin products  -No papilledema on fundus exam today  -The absence of papilledema does not rule out increased ICP, rest of workup per primary team  -Pain control and headache work up per primary team  -Pt should follow up with [facility] within a week of discharge, address/phone below    #EOM restrictions, anisocoria  - patient with meningioma expanding into cavernous sinus  - based on EOM restrictions, involvement of CN III, IV, and VI likely - consistent with cavernous sinus involvement  - VA 20/50 (likely 2/2 cataracts/dry eye) IOP wnl, CVF full, no APD appreciated  - anisocoria as above in exam  - dilated exam otherwise wnl  - recommend artificial tears 4x a day OU for dry eye  - discussed case with neuroophthalmology attending, no other ophthalmic workup indicated at this point  - further management per neurosurgery  - please reach out to ophthalmology with any acute change    Seen and discussed with Dr. Simpson, attending. Discussed with Dr. Smith, neuroophthalmology.    Outpatient Follow-up: Patient should follow-up with his/her ophthalmologist or with [facility] Department of Ophthalmology within 1 week of after discharge at:    [address]  [place] [address]  [phone]    Zaheer Li MD, PGY3  Also available on [employer] Teams     Assessment and Recommendations:  78y female with a past medical history/ocular history of L parasellar meningioma, memory loss, now with meningioma growing and invading cavernous sinus consulted for r/o papilledema, found to have EOM restrictions and abnormal pupil with ptosis due to cavernous sinus invasion, with no evidence of optic nerve head edema.    # Headaches, papilledema r/o  -Denies TVO, Denies tinnitus, diplopia, new medications inc abx or skin products  -No papilledema on fundus exam today  -The absence of papilledema does not rule out increased ICP, rest of workup per primary team  -Pain control and headache work up per primary team  -Pt should follow up with [facility] within a week of discharge, address/phone below    #EOM restrictions, anisocoria  - patient with meningioma expanding into cavernous sinus  - based on EOM restrictions, involvement of CN III, IV, and VI likely - consistent with cavernous sinus involvement  - VA 20/50 (likely 2/2 cataracts/dry eye) IOP wnl, CVF full, no APD appreciated  - anisocoria as above in exam  - dilated exam otherwise wnl  - recommend artificial tears 4x a day OU for dry eye  - discussed case with neuroophthalmology attending, no other ophthalmic workup indicated at this point  - further management per neurosurgery  - please reach out to ophthalmology with any acute change    Seen and discussed with Dr. Simpson, attending. Discussed with Dr. Smith, neuroophthalmology.    Outpatient Follow-up: Patient should follow-up with his/her ophthalmologist or with [facility] Department of Ophthalmology within 1 week of after discharge at:    [address]  [place] [address]  [phone]    Zaheer Li MD, PGY3  Also available on [employer] Teams

## 2023-12-29 DIAGNOSIS — Z01.818 ENCOUNTER FOR OTHER PREPROCEDURAL EXAMINATION: ICD-10-CM

## 2023-12-29 PROCEDURE — 99233 SBSQ HOSP IP/OBS HIGH 50: CPT

## 2023-12-29 RX ADMIN — CEFTRIAXONE 100 MILLIGRAM(S): 500 INJECTION, POWDER, FOR SOLUTION INTRAMUSCULAR; INTRAVENOUS at 00:00

## 2023-12-29 RX ADMIN — Medication 1 DROP(S): at 00:01

## 2023-12-29 RX ADMIN — Medication 1 DROP(S): at 06:38

## 2023-12-29 RX ADMIN — LIDOCAINE 1 PATCH: 4 CREAM TOPICAL at 06:37

## 2023-12-29 RX ADMIN — LIDOCAINE 1 PATCH: 4 CREAM TOPICAL at 08:01

## 2023-12-29 RX ADMIN — Medication 1 DROP(S): at 17:22

## 2023-12-29 RX ADMIN — Medication 1 DROP(S): at 12:04

## 2023-12-29 NOTE — PROGRESS NOTE ADULT - ASSESSMENT
78F, presenting for adult FTT after fall and rib fx few weeks ago.  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

## 2023-12-29 NOTE — PROGRESS NOTE ADULT - SUBJECTIVE AND OBJECTIVE BOX
Patient is a 78y old  Female who presents with a chief complaint of weakness (28 Dec 2023 13:06)      SUBJECTIVE / OVERNIGHT EVENTS:    Tele reviewed:       ADDITIONAL REVIEW OF SYSTEMS: Negative except for above    MEDICATIONS  (STANDING):  artificial tears (preservative free) Ophthalmic Solution 1 Drop(s) Both EYES four times a day  cefTRIAXone   IVPB 1000 milliGRAM(s) IV Intermittent every 24 hours  influenza  Vaccine (HIGH DOSE) 0.7 milliLiter(s) IntraMuscular once  lidocaine   4% Patch 1 Patch Transdermal every 24 hours    MEDICATIONS  (PRN):  acetaminophen     Tablet .. 650 milliGRAM(s) Oral every 6 hours PRN Temp greater or equal to 38C (100.4F), Mild Pain (1 - 3)  aluminum hydroxide/magnesium hydroxide/simethicone Suspension 30 milliLiter(s) Oral every 4 hours PRN Dyspepsia  ibuprofen  Tablet. 600 milliGRAM(s) Oral every 6 hours PRN Moderate Pain (4 - 6)  melatonin 3 milliGRAM(s) Oral at bedtime PRN Insomnia  ondansetron Injectable 4 milliGRAM(s) IV Push every 8 hours PRN Nausea and/or Vomiting      CAPILLARY BLOOD GLUCOSE        I&O's Summary    28 Dec 2023 07:01  -  29 Dec 2023 07:00  --------------------------------------------------------  IN: 711 mL / OUT: 800 mL / NET: -89 mL        PHYSICAL EXAM:  Vital Signs Last 24 Hrs  T(C): 37.6 (29 Dec 2023 05:16), Max: 37.6 (29 Dec 2023 05:16)  T(F): 99.6 (29 Dec 2023 05:16), Max: 99.6 (29 Dec 2023 05:16)  HR: 80 (29 Dec 2023 05:16) (70 - 83)  BP: 128/68 (29 Dec 2023 05:16) (123/60 - 134/61)  BP(mean): --  RR: 17 (29 Dec 2023 05:16) (17 - 18)  SpO2: 96% (29 Dec 2023 05:16) (96% - 96%)    Parameters below as of 29 Dec 2023 05:16  Patient On (Oxygen Delivery Method): room air        PHYSICAL EXAM:  GENERAL: NAD, well-developed  HEAD:  Atraumatic, Normocephalic  EYES:  conjunctiva and sclera clear  NECK: Supple, No JVD  CHEST/LUNG: Clear to auscultation bilaterally; No wheeze  HEART: Regular rate and rhythm; No murmurs, rubs, or gallops  ABDOMEN: Soft, Nontender, Nondistended; Bowel sounds present  EXTREMITIES:  2+ Peripheral Pulses, No clubbing, cyanosis, or edema  PSYCH: AAOx3  NEUROLOGY: non-focal  SKIN: No rashes or lesions      LABS:                        12.7   8.35  )-----------( 294      ( 28 Dec 2023 07:08 )             39.3     12-28    140  |  106  |  10  ----------------------------<  86  3.6   |  23  |  0.58    Ca    9.2      28 Dec 2023 07:38            Urinalysis Basic - ( 28 Dec 2023 07:38 )    Color: x / Appearance: x / SG: x / pH: x  Gluc: 86 mg/dL / Ketone: x  / Bili: x / Urobili: x   Blood: x / Protein: x / Nitrite: x   Leuk Esterase: x / RBC: x / WBC x   Sq Epi: x / Non Sq Epi: x / Bacteria: x        Culture - Urine (collected 27 Dec 2023 00:48)  Source: Clean Catch Clean Catch (Midstream)  Final Report (28 Dec 2023 00:40):    >=3 organisms. Probable collection contamination.        RADIOLOGY & ADDITIONAL TESTS:    Imaging Personally Reviewed:    Electrocardiogram Personally Reviewed:    COORDINATION OF CARE:  Care Discussed with Consultants/Other Providers [Y/N]:  Prior or Outpatient Records Reviewed [Y/N]:     Patient is a 78y old  Female who presents with a chief complaint of weakness (28 Dec 2023 13:06)      SUBJECTIVE / OVERNIGHT EVENTS:  Patient is seen and offers no complaints.  Patient was seen sitting on the chair       ADDITIONAL REVIEW OF SYSTEMS: Negative except for above    MEDICATIONS  (STANDING):  artificial tears (preservative free) Ophthalmic Solution 1 Drop(s) Both EYES four times a day  cefTRIAXone   IVPB 1000 milliGRAM(s) IV Intermittent every 24 hours  influenza  Vaccine (HIGH DOSE) 0.7 milliLiter(s) IntraMuscular once  lidocaine   4% Patch 1 Patch Transdermal every 24 hours    MEDICATIONS  (PRN):  acetaminophen     Tablet .. 650 milliGRAM(s) Oral every 6 hours PRN Temp greater or equal to 38C (100.4F), Mild Pain (1 - 3)  aluminum hydroxide/magnesium hydroxide/simethicone Suspension 30 milliLiter(s) Oral every 4 hours PRN Dyspepsia  ibuprofen  Tablet. 600 milliGRAM(s) Oral every 6 hours PRN Moderate Pain (4 - 6)  melatonin 3 milliGRAM(s) Oral at bedtime PRN Insomnia  ondansetron Injectable 4 milliGRAM(s) IV Push every 8 hours PRN Nausea and/or Vomiting      CAPILLARY BLOOD GLUCOSE        I&O's Summary    28 Dec 2023 07:01  -  29 Dec 2023 07:00  --------------------------------------------------------  IN: 711 mL / OUT: 800 mL / NET: -89 mL        PHYSICAL EXAM:  Vital Signs Last 24 Hrs  T(C): 37.6 (29 Dec 2023 05:16), Max: 37.6 (29 Dec 2023 05:16)  T(F): 99.6 (29 Dec 2023 05:16), Max: 99.6 (29 Dec 2023 05:16)  HR: 80 (29 Dec 2023 05:16) (70 - 83)  BP: 128/68 (29 Dec 2023 05:16) (123/60 - 134/61)  BP(mean): --  RR: 17 (29 Dec 2023 05:16) (17 - 18)  SpO2: 96% (29 Dec 2023 05:16) (96% - 96%)    Parameters below as of 29 Dec 2023 05:16  Patient On (Oxygen Delivery Method): room air        PHYSICAL EXAM:  GENERAL: NAD, well-developed  HEAD:  Atraumatic, Normocephalic  EYES:  conjunctiva and sclera clear  NECK: Supple, No JVD  CHEST/LUNG: Clear to auscultation bilaterally; No wheeze  HEART: Regular rate and rhythm; No murmurs, rubs, or gallops  ABDOMEN: Soft, Nontender, Nondistended; Bowel sounds present  EXTREMITIES:  2+ Peripheral Pulses, No clubbing, cyanosis, or edema  PSYCH: AAOx2 ( not to year)         LABS:                        12.7   8.35  )-----------( 294      ( 28 Dec 2023 07:08 )             39.3     12-28    140  |  106  |  10  ----------------------------<  86  3.6   |  23  |  0.58    Ca    9.2      28 Dec 2023 07:38            Urinalysis Basic - ( 28 Dec 2023 07:38 )    Color: x / Appearance: x / SG: x / pH: x  Gluc: 86 mg/dL / Ketone: x  / Bili: x / Urobili: x   Blood: x / Protein: x / Nitrite: x   Leuk Esterase: x / RBC: x / WBC x   Sq Epi: x / Non Sq Epi: x / Bacteria: x        Culture - Urine (collected 27 Dec 2023 00:48)  Source: Clean Catch Clean Catch (Midstream)  Final Report (28 Dec 2023 00:40):    >=3 organisms. Probable collection contamination.        RADIOLOGY & ADDITIONAL TESTS:    Imaging Personally Reviewed:    Electrocardiogram Personally Reviewed:    COORDINATION OF CARE:  Care Discussed with Consultants/Other Providers [Y/N]:  Prior or Outpatient Records Reviewed [Y/N]:

## 2023-12-29 NOTE — PROGRESS NOTE ADULT - PROBLEM SELECTOR PLAN 1
-known meningioma. CT head w/o vasogenic edema.   - neurosurgery team has seen pt who recommended MRI of brain which revealed large left cavernous mass   -  OPHTHALMOLOGY was consulted with no acute findings  Case was discussed with the Neurosurgery team with plan for possible  shunt placement

## 2023-12-29 NOTE — CHART NOTE - NSCHARTNOTEFT_GEN_A_CORE
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 Please document medical clearance

## 2023-12-30 PROCEDURE — 99233 SBSQ HOSP IP/OBS HIGH 50: CPT

## 2023-12-30 PROCEDURE — 70450 CT HEAD/BRAIN W/O DYE: CPT | Mod: 26

## 2023-12-30 PROCEDURE — 93010 ELECTROCARDIOGRAM REPORT: CPT

## 2023-12-30 PROCEDURE — 99232 SBSQ HOSP IP/OBS MODERATE 35: CPT

## 2023-12-30 RX ADMIN — CEFTRIAXONE 100 MILLIGRAM(S): 500 INJECTION, POWDER, FOR SOLUTION INTRAMUSCULAR; INTRAVENOUS at 00:47

## 2023-12-30 RX ADMIN — Medication 1 DROP(S): at 06:14

## 2023-12-30 RX ADMIN — LIDOCAINE 1 PATCH: 4 CREAM TOPICAL at 15:15

## 2023-12-30 RX ADMIN — Medication 3 MILLIGRAM(S): at 21:17

## 2023-12-30 RX ADMIN — Medication 1 DROP(S): at 17:18

## 2023-12-30 RX ADMIN — Medication 1 DROP(S): at 12:42

## 2023-12-30 RX ADMIN — LIDOCAINE 1 PATCH: 4 CREAM TOPICAL at 07:00

## 2023-12-30 RX ADMIN — LIDOCAINE 1 PATCH: 4 CREAM TOPICAL at 03:11

## 2023-12-30 RX ADMIN — Medication 1 DROP(S): at 00:44

## 2023-12-30 NOTE — PROGRESS NOTE ADULT - ATTENDING COMMENTS
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 Presenting with several weeks of gait difficulty (legs feel heavy and "stuck to floor" when she walks), memory difficulty, confusion, and one episode of urinary incontinence. Cranial imaging shows progressive ventricular enlargement with new transependymal flow since MRI in 2019. Left temporal horn appears disproportionately larger, but left temporal lobe has some encephalomalacia, so this increase in size of temporal horn may be due to ex vacuo changes. Denies headaches, nausea, or vomiting. No obvious obstruction of ventricular system on MRI CISS sequence or CINE study.  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  Will plan for a right ventriculoperitoneal shunt. Risks and benefits of surgery were discussed with the patient and her son and daughter, and they wish to proceed.

## 2023-12-30 NOTE — PROGRESS NOTE ADULT - ASSESSMENT
78F h/o known L parasellar mening dx 2015, following w/ Dr. Jackson, s/p SRS w/ Dr. Coates 8/2023, p/f gen weakness, fatigue, FTT, difficulty walking, mild memory loss, +UTI. 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 Outpatient MRI @[facility] 10/31/23 confirms mening extending into cav sinus, encasing LICA, extending medially to L temp lobe. Stable from MRI 4/2023 and 5/2017. However, L temporal horn appears trapped, increased in size on MRI 10/2023 from 4/2023. Exam: Gen weakness, wide awake, waxes/wanes, Ox2-3, no drift, L pupil 5R, R 4R, limited L eye upward and medial gaze, no facial, MORAN strongly, SILT  -Preop for VPS next wk with Dr. Flores   -Primary team documented medical clearance and family on board for VPS. Explained to patient however mentation waxes/wanes, Ox2-3, patient not consentable. Will consent son for OR.   -CISS MRI with Cine flow study done - hydro w transependymal flow   -optho - no papilledema   -UTI tx per primary 78F h/o known L parasellar mening dx 2015, following w/ Dr. Jackson, s/p SRS w/ Dr. Coates 8/2023, p/f gen weakness, fatigue, FTT, difficulty walking, mild memory loss, +UTI. 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 Outpatient MRI @[facility] 10/31/23 confirms mening extending into cav sinus, encasing LICA, extending medially to L temp lobe. Stable from MRI 4/2023 and 5/2017. However, L temporal horn appears trapped, increased in size on MRI 10/2023 from 4/2023. Exam: Gen weakness, wide awake, waxes/wanes, Ox2-3, no drift, L pupil 5R, R 4R, limited L eye upward and medial gaze, no facial, MORAN strongly, SILT  -Preop for VPS next wk with Dr. Flores   -Primary team documented medical clearance and family on board for VPS. Explained to patient however mentation waxes/wanes, Ox2-3, patient not consentable. Will consent son for OR.   -Please obtain a stereotactic CTH for VPS planning (CTH non con, write stereo in comments)   -CISS MRI with Cine flow study done - hydro w transependymal flow   -optho - no papilledema   -UTI tx per primary

## 2023-12-30 NOTE — PROGRESS NOTE ADULT - PROBLEM SELECTOR PLAN 4
-likely d/t decreased activity since fall and rib fx  -PT eval recommends VALERY -likely d/t decreased activity since fall and rib fx  -PT eval recommends VALERY  - pain control

## 2023-12-30 NOTE — PROGRESS NOTE ADULT - PROBLEM SELECTOR PLAN 3
-was ordered for CTX in the ED  -c/w for now. f/u Ucx  - completed Ceftriaxone TX -was ordered for CTX in the ED  - Ucx X 2 ( Neg and contaminated urine )   - completed Ceftriaxone TX  since there is plan for surgery --> will repeat U/A

## 2023-12-30 NOTE — PROGRESS NOTE ADULT - ASSESSMENT
78F, presenting for adult FTT after fall and rib fx few weeks ago.  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   78F, presenting for adult FTT after fall and rib fx few weeks ago.  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  Patient is awaiting for VPS

## 2023-12-30 NOTE — PROGRESS NOTE ADULT - PROBLEM SELECTOR PLAN 1
-known meningioma. CT head w/o vasogenic edema.   - neurosurgery team has seen pt who recommended MRI of brain which revealed large left cavernous mass   -  OPHTHALMOLOGY was consulted with no acute findings  Case was discussed with the Neurosurgery team with plan for possible  shunt placement -known meningioma. CT head w/o vasogenic edema.   - neurosurgery team has seen pt who recommended MRI of brain which revealed large left cavernous mass with plan for VPS placement   -  OPHTHALMOLOGY was consulted with no acute findings  Case was discussed with the Neurosurgery team with plan for possible  shunt placement

## 2023-12-30 NOTE — PROGRESS NOTE ADULT - SUBJECTIVE AND OBJECTIVE BOX
Patient seen and examined at bedside.    --Anticoagulation--    T(C): 36.6 (12-30-23 @ 05:38), Max: 36.8 (12-29-23 @ 17:44)  HR: 76 (12-30-23 @ 05:38) (76 - 77)  BP: 126/81 (12-30-23 @ 05:38) (109/56 - 126/81)  RR: 17 (12-30-23 @ 05:38) (17 - 18)  SpO2: 95% (12-30-23 @ 05:38) (94% - 95%)  Wt(kg): --    Exam: Gen weakness, wide awake, waxes/wanes, Ox2-3, no drift, L pupil 5R, R 4R, limited L eye upward and medial gaze, no facial, MORAN strongly, SILT

## 2023-12-30 NOTE — PROGRESS NOTE ADULT - SUBJECTIVE AND OBJECTIVE BOX
Patient is a 78y old  Female who presents with a chief complaint of weakness (28 Dec 2023 13:06)      SUBJECTIVE / OVERNIGHT EVENTS:  NO overnight events       ADDITIONAL REVIEW OF SYSTEMS: Negative except for above    MEDICATIONS  (STANDING):  artificial tears (preservative free) Ophthalmic Solution 1 Drop(s) Both EYES four times a day  influenza  Vaccine (HIGH DOSE) 0.7 milliLiter(s) IntraMuscular once  lidocaine   4% Patch 1 Patch Transdermal every 24 hours    MEDICATIONS  (PRN):  acetaminophen     Tablet .. 650 milliGRAM(s) Oral every 6 hours PRN Temp greater or equal to 38C (100.4F), Mild Pain (1 - 3)  aluminum hydroxide/magnesium hydroxide/simethicone Suspension 30 milliLiter(s) Oral every 4 hours PRN Dyspepsia  melatonin 3 milliGRAM(s) Oral at bedtime PRN Insomnia  ondansetron Injectable 4 milliGRAM(s) IV Push every 8 hours PRN Nausea and/or Vomiting      CAPILLARY BLOOD GLUCOSE        I&O's Summary    29 Dec 2023 07:01  -  30 Dec 2023 07:00  --------------------------------------------------------  IN: 417 mL / OUT: 1250 mL / NET: -833 mL    30 Dec 2023 07:01  -  30 Dec 2023 12:09  --------------------------------------------------------  IN: 240 mL / OUT: 300 mL / NET: -60 mL        PHYSICAL EXAM:  Vital Signs Last 24 Hrs  T(C): 36.9 (30 Dec 2023 11:30), Max: 36.9 (30 Dec 2023 11:30)  T(F): 98.5 (30 Dec 2023 11:30), Max: 98.5 (30 Dec 2023 11:30)  HR: 78 (30 Dec 2023 11:30) (76 - 78)  BP: 115/70 (30 Dec 2023 11:30) (109/56 - 126/81)  BP(mean): --  RR: 18 (30 Dec 2023 11:30) (17 - 18)  SpO2: 98% (30 Dec 2023 11:30) (94% - 98%)    Parameters below as of 30 Dec 2023 11:30  Patient On (Oxygen Delivery Method): room air        PHYSICAL EXAM:  GENERAL: NAD, well-developed  HEAD:  Atraumatic, Normocephalic  EYES:  conjunctiva and sclera clear  NECK: Supple, No JVD  CHEST/LUNG: Clear to auscultation bilaterally; No wheeze  HEART: Regular rate and rhythm; No murmurs, rubs, or gallops  ABDOMEN: Soft, Nontender, Nondistended; Bowel sounds present  EXTREMITIES:  2+ Peripheral Pulses, No clubbing, cyanosis, or edema  PSYCH: AAOx2 ( not to year)       LABS:  NO labs today                 Culture - Urine (collected 28 Dec 2023 10:15)  Source: Clean Catch Clean Catch (Midstream)  Final Report (29 Dec 2023 13:45):    <10,000 CFU/mL Normal Urogenital Camila        RADIOLOGY & ADDITIONAL TESTS:    Imaging Personally Reviewed:    Electrocardiogram Personally Reviewed:    COORDINATION OF CARE:  Care Discussed with Consultants/Other Providers [Y/N]:  Prior or Outpatient Records Reviewed [Y/N]:     Patient is a 78y old  Female who presents with a chief complaint of weakness (28 Dec 2023 13:06)      SUBJECTIVE / OVERNIGHT EVENTS:  NO overnight events   Patient was seen with family at the bedside .    No complaints       ADDITIONAL REVIEW OF SYSTEMS: Negative except for above    MEDICATIONS  (STANDING):  artificial tears (preservative free) Ophthalmic Solution 1 Drop(s) Both EYES four times a day  influenza  Vaccine (HIGH DOSE) 0.7 milliLiter(s) IntraMuscular once  lidocaine   4% Patch 1 Patch Transdermal every 24 hours    MEDICATIONS  (PRN):  acetaminophen     Tablet .. 650 milliGRAM(s) Oral every 6 hours PRN Temp greater or equal to 38C (100.4F), Mild Pain (1 - 3)  aluminum hydroxide/magnesium hydroxide/simethicone Suspension 30 milliLiter(s) Oral every 4 hours PRN Dyspepsia  melatonin 3 milliGRAM(s) Oral at bedtime PRN Insomnia  ondansetron Injectable 4 milliGRAM(s) IV Push every 8 hours PRN Nausea and/or Vomiting      CAPILLARY BLOOD GLUCOSE        I&O's Summary    29 Dec 2023 07:01  -  30 Dec 2023 07:00  --------------------------------------------------------  IN: 417 mL / OUT: 1250 mL / NET: -833 mL    30 Dec 2023 07:01  -  30 Dec 2023 12:09  --------------------------------------------------------  IN: 240 mL / OUT: 300 mL / NET: -60 mL        PHYSICAL EXAM:  Vital Signs Last 24 Hrs  T(C): 36.9 (30 Dec 2023 11:30), Max: 36.9 (30 Dec 2023 11:30)  T(F): 98.5 (30 Dec 2023 11:30), Max: 98.5 (30 Dec 2023 11:30)  HR: 78 (30 Dec 2023 11:30) (76 - 78)  BP: 115/70 (30 Dec 2023 11:30) (109/56 - 126/81)  BP(mean): --  RR: 18 (30 Dec 2023 11:30) (17 - 18)  SpO2: 98% (30 Dec 2023 11:30) (94% - 98%)    Parameters below as of 30 Dec 2023 11:30  Patient On (Oxygen Delivery Method): room air        PHYSICAL EXAM:  GENERAL: NAD, well-developed  HEAD:  Atraumatic, Normocephalic  EYES:  conjunctiva and sclera clear  NECK: Supple, No JVD  CHEST/LUNG: Clear to auscultation bilaterally; No wheeze  HEART: Regular rate and rhythm; No murmurs, rubs, or gallops  ABDOMEN: Soft, Nontender, Nondistended; Bowel sounds present  EXTREMITIES:  2+ Peripheral Pulses, No clubbing, cyanosis, or edema  PSYCH: AAOx2       LABS:  NO labs today                 Culture - Urine (collected 28 Dec 2023 10:15)  Source: Clean Catch Clean Catch (Midstream)  Final Report (29 Dec 2023 13:45):    <10,000 CFU/mL Normal Urogenital Camila        RADIOLOGY & ADDITIONAL TESTS:    Imaging Personally Reviewed:    Electrocardiogram Personally Reviewed:    COORDINATION OF CARE:  Care Discussed with Consultants/Other Providers [Y/N]:  Prior or Outpatient Records Reviewed [Y/N]:

## 2023-12-31 LAB
ALBUMIN SERPL ELPH-MCNC: 4.2 G/DL — SIGNIFICANT CHANGE UP (ref 3.3–5)
ALBUMIN SERPL ELPH-MCNC: 4.2 G/DL — SIGNIFICANT CHANGE UP (ref 3.3–5)
ALP SERPL-CCNC: 115 U/L — SIGNIFICANT CHANGE UP (ref 40–120)
ALP SERPL-CCNC: 115 U/L — SIGNIFICANT CHANGE UP (ref 40–120)
ALT FLD-CCNC: 12 U/L — SIGNIFICANT CHANGE UP (ref 10–45)
ALT FLD-CCNC: 12 U/L — SIGNIFICANT CHANGE UP (ref 10–45)
ANION GAP SERPL CALC-SCNC: 10 MMOL/L — SIGNIFICANT CHANGE UP (ref 5–17)
ANION GAP SERPL CALC-SCNC: 10 MMOL/L — SIGNIFICANT CHANGE UP (ref 5–17)
APPEARANCE UR: CLEAR — SIGNIFICANT CHANGE UP
APPEARANCE UR: CLEAR — SIGNIFICANT CHANGE UP
AST SERPL-CCNC: 16 U/L — SIGNIFICANT CHANGE UP (ref 10–40)
AST SERPL-CCNC: 16 U/L — SIGNIFICANT CHANGE UP (ref 10–40)
BACTERIA # UR AUTO: NEGATIVE /HPF — SIGNIFICANT CHANGE UP
BACTERIA # UR AUTO: NEGATIVE /HPF — SIGNIFICANT CHANGE UP
BASOPHILS # BLD AUTO: 0.04 K/UL — SIGNIFICANT CHANGE UP (ref 0–0.2)
BASOPHILS # BLD AUTO: 0.04 K/UL — SIGNIFICANT CHANGE UP (ref 0–0.2)
BASOPHILS NFR BLD AUTO: 0.4 % — SIGNIFICANT CHANGE UP (ref 0–2)
BASOPHILS NFR BLD AUTO: 0.4 % — SIGNIFICANT CHANGE UP (ref 0–2)
BILIRUB SERPL-MCNC: 0.3 MG/DL — SIGNIFICANT CHANGE UP (ref 0.2–1.2)
BILIRUB SERPL-MCNC: 0.3 MG/DL — SIGNIFICANT CHANGE UP (ref 0.2–1.2)
BILIRUB UR-MCNC: NEGATIVE — SIGNIFICANT CHANGE UP
BILIRUB UR-MCNC: NEGATIVE — SIGNIFICANT CHANGE UP
BUN SERPL-MCNC: 14 MG/DL — SIGNIFICANT CHANGE UP (ref 7–23)
BUN SERPL-MCNC: 14 MG/DL — SIGNIFICANT CHANGE UP (ref 7–23)
CALCIUM SERPL-MCNC: 9.7 MG/DL — SIGNIFICANT CHANGE UP (ref 8.4–10.5)
CALCIUM SERPL-MCNC: 9.7 MG/DL — SIGNIFICANT CHANGE UP (ref 8.4–10.5)
CAST: 0 /LPF — SIGNIFICANT CHANGE UP (ref 0–4)
CAST: 0 /LPF — SIGNIFICANT CHANGE UP (ref 0–4)
CHLORIDE SERPL-SCNC: 104 MMOL/L — SIGNIFICANT CHANGE UP (ref 96–108)
CHLORIDE SERPL-SCNC: 104 MMOL/L — SIGNIFICANT CHANGE UP (ref 96–108)
CO2 SERPL-SCNC: 25 MMOL/L — SIGNIFICANT CHANGE UP (ref 22–31)
CO2 SERPL-SCNC: 25 MMOL/L — SIGNIFICANT CHANGE UP (ref 22–31)
COLOR SPEC: YELLOW — SIGNIFICANT CHANGE UP
COLOR SPEC: YELLOW — SIGNIFICANT CHANGE UP
CREAT SERPL-MCNC: 0.57 MG/DL — SIGNIFICANT CHANGE UP (ref 0.5–1.3)
CREAT SERPL-MCNC: 0.57 MG/DL — SIGNIFICANT CHANGE UP (ref 0.5–1.3)
DIFF PNL FLD: NEGATIVE — SIGNIFICANT CHANGE UP
DIFF PNL FLD: NEGATIVE — SIGNIFICANT CHANGE UP
EGFR: 94 ML/MIN/1.73M2 — SIGNIFICANT CHANGE UP
EGFR: 94 ML/MIN/1.73M2 — SIGNIFICANT CHANGE UP
EOSINOPHIL # BLD AUTO: 0.22 K/UL — SIGNIFICANT CHANGE UP (ref 0–0.5)
EOSINOPHIL # BLD AUTO: 0.22 K/UL — SIGNIFICANT CHANGE UP (ref 0–0.5)
EOSINOPHIL NFR BLD AUTO: 2.1 % — SIGNIFICANT CHANGE UP (ref 0–6)
EOSINOPHIL NFR BLD AUTO: 2.1 % — SIGNIFICANT CHANGE UP (ref 0–6)
GLUCOSE SERPL-MCNC: 146 MG/DL — HIGH (ref 70–99)
GLUCOSE SERPL-MCNC: 146 MG/DL — HIGH (ref 70–99)
GLUCOSE UR QL: NEGATIVE MG/DL — SIGNIFICANT CHANGE UP
GLUCOSE UR QL: NEGATIVE MG/DL — SIGNIFICANT CHANGE UP
HCT VFR BLD CALC: 43.1 % — SIGNIFICANT CHANGE UP (ref 34.5–45)
HCT VFR BLD CALC: 43.1 % — SIGNIFICANT CHANGE UP (ref 34.5–45)
HGB BLD-MCNC: 14 G/DL — SIGNIFICANT CHANGE UP (ref 11.5–15.5)
HGB BLD-MCNC: 14 G/DL — SIGNIFICANT CHANGE UP (ref 11.5–15.5)
IMM GRANULOCYTES NFR BLD AUTO: 0.3 % — SIGNIFICANT CHANGE UP (ref 0–0.9)
IMM GRANULOCYTES NFR BLD AUTO: 0.3 % — SIGNIFICANT CHANGE UP (ref 0–0.9)
INR BLD: 1.11 RATIO — SIGNIFICANT CHANGE UP (ref 0.85–1.18)
INR BLD: 1.11 RATIO — SIGNIFICANT CHANGE UP (ref 0.85–1.18)
KETONES UR-MCNC: NEGATIVE MG/DL — SIGNIFICANT CHANGE UP
KETONES UR-MCNC: NEGATIVE MG/DL — SIGNIFICANT CHANGE UP
LEUKOCYTE ESTERASE UR-ACNC: NEGATIVE — SIGNIFICANT CHANGE UP
LEUKOCYTE ESTERASE UR-ACNC: NEGATIVE — SIGNIFICANT CHANGE UP
LYMPHOCYTES # BLD AUTO: 2.15 K/UL — SIGNIFICANT CHANGE UP (ref 1–3.3)
LYMPHOCYTES # BLD AUTO: 2.15 K/UL — SIGNIFICANT CHANGE UP (ref 1–3.3)
LYMPHOCYTES # BLD AUTO: 20.4 % — SIGNIFICANT CHANGE UP (ref 13–44)
LYMPHOCYTES # BLD AUTO: 20.4 % — SIGNIFICANT CHANGE UP (ref 13–44)
MCHC RBC-ENTMCNC: 26.2 PG — LOW (ref 27–34)
MCHC RBC-ENTMCNC: 26.2 PG — LOW (ref 27–34)
MCHC RBC-ENTMCNC: 32.5 GM/DL — SIGNIFICANT CHANGE UP (ref 32–36)
MCHC RBC-ENTMCNC: 32.5 GM/DL — SIGNIFICANT CHANGE UP (ref 32–36)
MCV RBC AUTO: 80.6 FL — SIGNIFICANT CHANGE UP (ref 80–100)
MCV RBC AUTO: 80.6 FL — SIGNIFICANT CHANGE UP (ref 80–100)
MONOCYTES # BLD AUTO: 0.78 K/UL — SIGNIFICANT CHANGE UP (ref 0–0.9)
MONOCYTES # BLD AUTO: 0.78 K/UL — SIGNIFICANT CHANGE UP (ref 0–0.9)
MONOCYTES NFR BLD AUTO: 7.4 % — SIGNIFICANT CHANGE UP (ref 2–14)
MONOCYTES NFR BLD AUTO: 7.4 % — SIGNIFICANT CHANGE UP (ref 2–14)
NEUTROPHILS # BLD AUTO: 7.3 K/UL — SIGNIFICANT CHANGE UP (ref 1.8–7.4)
NEUTROPHILS # BLD AUTO: 7.3 K/UL — SIGNIFICANT CHANGE UP (ref 1.8–7.4)
NEUTROPHILS NFR BLD AUTO: 69.4 % — SIGNIFICANT CHANGE UP (ref 43–77)
NEUTROPHILS NFR BLD AUTO: 69.4 % — SIGNIFICANT CHANGE UP (ref 43–77)
NITRITE UR-MCNC: NEGATIVE — SIGNIFICANT CHANGE UP
NITRITE UR-MCNC: NEGATIVE — SIGNIFICANT CHANGE UP
NRBC # BLD: 0 /100 WBCS — SIGNIFICANT CHANGE UP (ref 0–0)
NRBC # BLD: 0 /100 WBCS — SIGNIFICANT CHANGE UP (ref 0–0)
PH UR: 6 — SIGNIFICANT CHANGE UP (ref 5–8)
PH UR: 6 — SIGNIFICANT CHANGE UP (ref 5–8)
PLATELET # BLD AUTO: 332 K/UL — SIGNIFICANT CHANGE UP (ref 150–400)
PLATELET # BLD AUTO: 332 K/UL — SIGNIFICANT CHANGE UP (ref 150–400)
POTASSIUM SERPL-MCNC: 3.6 MMOL/L — SIGNIFICANT CHANGE UP (ref 3.5–5.3)
POTASSIUM SERPL-MCNC: 3.6 MMOL/L — SIGNIFICANT CHANGE UP (ref 3.5–5.3)
POTASSIUM SERPL-SCNC: 3.6 MMOL/L — SIGNIFICANT CHANGE UP (ref 3.5–5.3)
POTASSIUM SERPL-SCNC: 3.6 MMOL/L — SIGNIFICANT CHANGE UP (ref 3.5–5.3)
PROT SERPL-MCNC: 7.7 G/DL — SIGNIFICANT CHANGE UP (ref 6–8.3)
PROT SERPL-MCNC: 7.7 G/DL — SIGNIFICANT CHANGE UP (ref 6–8.3)
PROT UR-MCNC: NEGATIVE MG/DL — SIGNIFICANT CHANGE UP
PROT UR-MCNC: NEGATIVE MG/DL — SIGNIFICANT CHANGE UP
PROTHROM AB SERPL-ACNC: 12.2 SEC — SIGNIFICANT CHANGE UP (ref 9.5–13)
PROTHROM AB SERPL-ACNC: 12.2 SEC — SIGNIFICANT CHANGE UP (ref 9.5–13)
RBC # BLD: 5.35 M/UL — HIGH (ref 3.8–5.2)
RBC # BLD: 5.35 M/UL — HIGH (ref 3.8–5.2)
RBC # FLD: 14.8 % — HIGH (ref 10.3–14.5)
RBC # FLD: 14.8 % — HIGH (ref 10.3–14.5)
RBC CASTS # UR COMP ASSIST: 1 /HPF — SIGNIFICANT CHANGE UP (ref 0–4)
RBC CASTS # UR COMP ASSIST: 1 /HPF — SIGNIFICANT CHANGE UP (ref 0–4)
SODIUM SERPL-SCNC: 139 MMOL/L — SIGNIFICANT CHANGE UP (ref 135–145)
SODIUM SERPL-SCNC: 139 MMOL/L — SIGNIFICANT CHANGE UP (ref 135–145)
SP GR SPEC: 1.01 — SIGNIFICANT CHANGE UP (ref 1–1.03)
SP GR SPEC: 1.01 — SIGNIFICANT CHANGE UP (ref 1–1.03)
SQUAMOUS # UR AUTO: 0 /HPF — SIGNIFICANT CHANGE UP (ref 0–5)
SQUAMOUS # UR AUTO: 0 /HPF — SIGNIFICANT CHANGE UP (ref 0–5)
UROBILINOGEN FLD QL: 0.2 MG/DL — SIGNIFICANT CHANGE UP (ref 0.2–1)
UROBILINOGEN FLD QL: 0.2 MG/DL — SIGNIFICANT CHANGE UP (ref 0.2–1)
WBC # BLD: 10.52 K/UL — HIGH (ref 3.8–10.5)
WBC # BLD: 10.52 K/UL — HIGH (ref 3.8–10.5)
WBC # FLD AUTO: 10.52 K/UL — HIGH (ref 3.8–10.5)
WBC # FLD AUTO: 10.52 K/UL — HIGH (ref 3.8–10.5)
WBC UR QL: 0 /HPF — SIGNIFICANT CHANGE UP (ref 0–5)
WBC UR QL: 0 /HPF — SIGNIFICANT CHANGE UP (ref 0–5)

## 2023-12-31 PROCEDURE — 99232 SBSQ HOSP IP/OBS MODERATE 35: CPT

## 2023-12-31 RX ADMIN — Medication 1 DROP(S): at 23:41

## 2023-12-31 RX ADMIN — LIDOCAINE 1 PATCH: 4 CREAM TOPICAL at 07:00

## 2023-12-31 RX ADMIN — LIDOCAINE 1 PATCH: 4 CREAM TOPICAL at 15:35

## 2023-12-31 RX ADMIN — Medication 1 DROP(S): at 05:31

## 2023-12-31 RX ADMIN — Medication 1 DROP(S): at 17:30

## 2023-12-31 RX ADMIN — Medication 1 DROP(S): at 11:13

## 2023-12-31 RX ADMIN — Medication 1 DROP(S): at 00:00

## 2023-12-31 RX ADMIN — LIDOCAINE 1 PATCH: 4 CREAM TOPICAL at 03:31

## 2023-12-31 RX ADMIN — Medication 3 MILLIGRAM(S): at 21:22

## 2023-12-31 NOTE — PROGRESS NOTE ADULT - ASSESSMENT
Exam: stable   Gen weakness, wide awake, waxes/wanes, Ox2-3, no drift, L pupil 5R, R 4R, limited L eye upward and medial gaze, no facial, MORAN strongly, SILT    Booked for OR tentatively Thursday 1/4 for VPS  -CISS MRI with Cine flow study done - hydro w transependymal flow   -Stereo CTH done 12/30  -cleared and consented. Will preop for Thursday

## 2023-12-31 NOTE — PROGRESS NOTE ADULT - SUBJECTIVE AND OBJECTIVE BOX
Patient seen and examined at bedside.    --Anticoagulation--    T(C): 37 (12-31-23 @ 17:22), Max: 37 (12-31-23 @ 17:22)  HR: 72 (12-31-23 @ 17:22) (72 - 79)  BP: 145/76 (12-31-23 @ 17:22) (119/68 - 145/76)  RR: 18 (12-31-23 @ 17:22) (18 - 18)  SpO2: 95% (12-31-23 @ 17:22) (93% - 95%)  Wt(kg): --    Exam: stable   Gen weakness, wide awake, waxes/wanes, Ox2-3, no drift, L pupil 5R, R 4R, limited L eye upward and medial gaze, no facial, MORAN strongly, SILT

## 2023-12-31 NOTE — PROGRESS NOTE ADULT - PROBLEM SELECTOR PLAN 1
-known meningioma. CT head w/o vasogenic edema.   - neurosurgery team has seen pt who recommended MRI of brain which revealed large left cavernous mass with plan for VPS placement   -  OPHTHALMOLOGY was consulted with no acute findings  Case was discussed with the Neurosurgery team with plan for possible  shunt placement

## 2023-12-31 NOTE — PROGRESS NOTE ADULT - ASSESSMENT
78F, presenting for adult FTT after fall and rib fx few weeks ago.  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  Patient is awaiting for VPS

## 2023-12-31 NOTE — PROGRESS NOTE ADULT - PROBLEM SELECTOR PLAN 3
-was ordered for CTX in the ED  - Ucx X 2 ( Neg and contaminated urine )   - completed Ceftriaxone TX  since there is plan for surgery --> will repeat U/A -was ordered for CTX in the ED  - Ucx X 2 ( Neg and contaminated urine )   - completed Ceftriaxone TX  since there is plan for surgery --> Neg repeat U/A

## 2023-12-31 NOTE — PROGRESS NOTE ADULT - SUBJECTIVE AND OBJECTIVE BOX
Patient is a 78y old  Female who presents with a chief complaint of weakness (30 Dec 2023 12:09)      SUBJECTIVE / OVERNIGHT EVENTS:    Tele reviewed:       ADDITIONAL REVIEW OF SYSTEMS: Negative except for above    MEDICATIONS  (STANDING):  artificial tears (preservative free) Ophthalmic Solution 1 Drop(s) Both EYES four times a day  influenza  Vaccine (HIGH DOSE) 0.7 milliLiter(s) IntraMuscular once  lidocaine   4% Patch 1 Patch Transdermal every 24 hours    MEDICATIONS  (PRN):  acetaminophen     Tablet .. 650 milliGRAM(s) Oral every 6 hours PRN Temp greater or equal to 38C (100.4F), Mild Pain (1 - 3)  aluminum hydroxide/magnesium hydroxide/simethicone Suspension 30 milliLiter(s) Oral every 4 hours PRN Dyspepsia  melatonin 3 milliGRAM(s) Oral at bedtime PRN Insomnia  ondansetron Injectable 4 milliGRAM(s) IV Push every 8 hours PRN Nausea and/or Vomiting      CAPILLARY BLOOD GLUCOSE        I&O's Summary    30 Dec 2023 07:01  -  31 Dec 2023 07:00  --------------------------------------------------------  IN: 657 mL / OUT: 1250 mL / NET: -593 mL    31 Dec 2023 07:01  -  31 Dec 2023 14:16  --------------------------------------------------------  IN: 420 mL / OUT: 200 mL / NET: 220 mL        PHYSICAL EXAM:  Vital Signs Last 24 Hrs  T(C): 36.9 (31 Dec 2023 12:04), Max: 37.1 (30 Dec 2023 17:18)  T(F): 98.4 (31 Dec 2023 12:04), Max: 98.8 (30 Dec 2023 17:18)  HR: 75 (31 Dec 2023 12:04) (75 - 81)  BP: 119/68 (31 Dec 2023 12:04) (117/71 - 127/77)  BP(mean): --  RR: 18 (31 Dec 2023 12:04) (18 - 18)  SpO2: 93% (31 Dec 2023 12:04) (93% - 95%)    Parameters below as of 31 Dec 2023 12:04  Patient On (Oxygen Delivery Method): room air        PHYSICAL EXAM:  GENERAL: NAD, well-developed  HEAD:  Atraumatic, Normocephalic  EYES:  conjunctiva and sclera clear  NECK: Supple, No JVD  CHEST/LUNG: Clear to auscultation bilaterally; No wheeze  HEART: Regular rate and rhythm; No murmurs, rubs, or gallops  ABDOMEN: Soft, Nontender, Nondistended; Bowel sounds present  EXTREMITIES:  2+ Peripheral Pulses, No clubbing, cyanosis, or edema  PSYCH: AAOx3  NEUROLOGY: non-focal  SKIN: No rashes or lesions      LABS:                        14.0   10.52 )-----------( 332      ( 31 Dec 2023 10:17 )             43.1     12-31    139  |  104  |  14  ----------------------------<  146<H>  3.6   |  25  |  0.57    Ca    9.7      31 Dec 2023 10:17    TPro  7.7  /  Alb  4.2  /  TBili  0.3  /  DBili  x   /  AST  16  /  ALT  12  /  AlkPhos  115  12-31    PT/INR - ( 31 Dec 2023 10:17 )   PT: 12.2 sec;   INR: 1.11 ratio               Urinalysis Basic - ( 31 Dec 2023 10:17 )    Color: x / Appearance: x / SG: x / pH: x  Gluc: 146 mg/dL / Ketone: x  / Bili: x / Urobili: x   Blood: x / Protein: x / Nitrite: x   Leuk Esterase: x / RBC: x / WBC x   Sq Epi: x / Non Sq Epi: x / Bacteria: x          RADIOLOGY & ADDITIONAL TESTS:    Imaging Personally Reviewed:    Electrocardiogram Personally Reviewed:    COORDINATION OF CARE:  Care Discussed with Consultants/Other Providers [Y/N]:  Prior or Outpatient Records Reviewed [Y/N]:     Patient is a 78y old  Female who presents with a chief complaint of weakness (30 Dec 2023 12:09)      SUBJECTIVE / OVERNIGHT EVENTS:    NO complaints       ADDITIONAL REVIEW OF SYSTEMS: Negative except for above    MEDICATIONS  (STANDING):  artificial tears (preservative free) Ophthalmic Solution 1 Drop(s) Both EYES four times a day  influenza  Vaccine (HIGH DOSE) 0.7 milliLiter(s) IntraMuscular once  lidocaine   4% Patch 1 Patch Transdermal every 24 hours    MEDICATIONS  (PRN):  acetaminophen     Tablet .. 650 milliGRAM(s) Oral every 6 hours PRN Temp greater or equal to 38C (100.4F), Mild Pain (1 - 3)  aluminum hydroxide/magnesium hydroxide/simethicone Suspension 30 milliLiter(s) Oral every 4 hours PRN Dyspepsia  melatonin 3 milliGRAM(s) Oral at bedtime PRN Insomnia  ondansetron Injectable 4 milliGRAM(s) IV Push every 8 hours PRN Nausea and/or Vomiting      CAPILLARY BLOOD GLUCOSE        I&O's Summary    30 Dec 2023 07:01  -  31 Dec 2023 07:00  --------------------------------------------------------  IN: 657 mL / OUT: 1250 mL / NET: -593 mL    31 Dec 2023 07:01  -  31 Dec 2023 14:16  --------------------------------------------------------  IN: 420 mL / OUT: 200 mL / NET: 220 mL        PHYSICAL EXAM:  Vital Signs Last 24 Hrs  T(C): 36.9 (31 Dec 2023 12:04), Max: 37.1 (30 Dec 2023 17:18)  T(F): 98.4 (31 Dec 2023 12:04), Max: 98.8 (30 Dec 2023 17:18)  HR: 75 (31 Dec 2023 12:04) (75 - 81)  BP: 119/68 (31 Dec 2023 12:04) (117/71 - 127/77)  BP(mean): --  RR: 18 (31 Dec 2023 12:04) (18 - 18)  SpO2: 93% (31 Dec 2023 12:04) (93% - 95%)    Parameters below as of 31 Dec 2023 12:04  Patient On (Oxygen Delivery Method): room air        PHYSICAL EXAM:  GENERAL: NAD, well-developed  HEAD:  Atraumatic, Normocephalic  EYES:  conjunctiva and sclera clear  NECK: Supple, No JVD  CHEST/LUNG: Clear to auscultation bilaterally; No wheeze  HEART: Regular rate and rhythm; No murmurs, rubs, or gallops  ABDOMEN: Soft, Nontender, Nondistended; Bowel sounds present  EXTREMITIES:  2+ Peripheral Pulses, No clubbing, cyanosis, or edema  PSYCH: AAOx2         LABS:                        14.0   10.52 )-----------( 332      ( 31 Dec 2023 10:17 )             43.1     12-31    139  |  104  |  14  ----------------------------<  146<H>  3.6   |  25  |  0.57    Ca    9.7      31 Dec 2023 10:17    TPro  7.7  /  Alb  4.2  /  TBili  0.3  /  DBili  x   /  AST  16  /  ALT  12  /  AlkPhos  115  12-31    PT/INR - ( 31 Dec 2023 10:17 )   PT: 12.2 sec;   INR: 1.11 ratio               Urinalysis Basic - ( 31 Dec 2023 10:17 )    Color: x / Appearance: x / SG: x / pH: x  Gluc: 146 mg/dL / Ketone: x  / Bili: x / Urobili: x   Blood: x / Protein: x / Nitrite: x   Leuk Esterase: x / RBC: x / WBC x   Sq Epi: x / Non Sq Epi: x / Bacteria: x          RADIOLOGY & ADDITIONAL TESTS:    Imaging Personally Reviewed:    Electrocardiogram Personally Reviewed:    COORDINATION OF CARE:  Care Discussed with Consultants/Other Providers [Y/N]:  Prior or Outpatient Records Reviewed [Y/N]:

## 2024-01-01 LAB
BASOPHILS # BLD AUTO: 0.05 K/UL — SIGNIFICANT CHANGE UP (ref 0–0.2)
BASOPHILS # BLD AUTO: 0.05 K/UL — SIGNIFICANT CHANGE UP (ref 0–0.2)
BASOPHILS NFR BLD AUTO: 0.5 % — SIGNIFICANT CHANGE UP (ref 0–2)
BASOPHILS NFR BLD AUTO: 0.5 % — SIGNIFICANT CHANGE UP (ref 0–2)
EOSINOPHIL # BLD AUTO: 0.14 K/UL — SIGNIFICANT CHANGE UP (ref 0–0.5)
EOSINOPHIL # BLD AUTO: 0.14 K/UL — SIGNIFICANT CHANGE UP (ref 0–0.5)
EOSINOPHIL NFR BLD AUTO: 1.5 % — SIGNIFICANT CHANGE UP (ref 0–6)
EOSINOPHIL NFR BLD AUTO: 1.5 % — SIGNIFICANT CHANGE UP (ref 0–6)
HCT VFR BLD CALC: 40.8 % — SIGNIFICANT CHANGE UP (ref 34.5–45)
HCT VFR BLD CALC: 40.8 % — SIGNIFICANT CHANGE UP (ref 34.5–45)
HGB BLD-MCNC: 13.5 G/DL — SIGNIFICANT CHANGE UP (ref 11.5–15.5)
HGB BLD-MCNC: 13.5 G/DL — SIGNIFICANT CHANGE UP (ref 11.5–15.5)
IMM GRANULOCYTES NFR BLD AUTO: 0.3 % — SIGNIFICANT CHANGE UP (ref 0–0.9)
IMM GRANULOCYTES NFR BLD AUTO: 0.3 % — SIGNIFICANT CHANGE UP (ref 0–0.9)
LYMPHOCYTES # BLD AUTO: 1.96 K/UL — SIGNIFICANT CHANGE UP (ref 1–3.3)
LYMPHOCYTES # BLD AUTO: 1.96 K/UL — SIGNIFICANT CHANGE UP (ref 1–3.3)
LYMPHOCYTES # BLD AUTO: 21.4 % — SIGNIFICANT CHANGE UP (ref 13–44)
LYMPHOCYTES # BLD AUTO: 21.4 % — SIGNIFICANT CHANGE UP (ref 13–44)
MCHC RBC-ENTMCNC: 26.4 PG — LOW (ref 27–34)
MCHC RBC-ENTMCNC: 26.4 PG — LOW (ref 27–34)
MCHC RBC-ENTMCNC: 33.1 GM/DL — SIGNIFICANT CHANGE UP (ref 32–36)
MCHC RBC-ENTMCNC: 33.1 GM/DL — SIGNIFICANT CHANGE UP (ref 32–36)
MCV RBC AUTO: 79.7 FL — LOW (ref 80–100)
MCV RBC AUTO: 79.7 FL — LOW (ref 80–100)
MONOCYTES # BLD AUTO: 0.77 K/UL — SIGNIFICANT CHANGE UP (ref 0–0.9)
MONOCYTES # BLD AUTO: 0.77 K/UL — SIGNIFICANT CHANGE UP (ref 0–0.9)
MONOCYTES NFR BLD AUTO: 8.4 % — SIGNIFICANT CHANGE UP (ref 2–14)
MONOCYTES NFR BLD AUTO: 8.4 % — SIGNIFICANT CHANGE UP (ref 2–14)
NEUTROPHILS # BLD AUTO: 6.19 K/UL — SIGNIFICANT CHANGE UP (ref 1.8–7.4)
NEUTROPHILS # BLD AUTO: 6.19 K/UL — SIGNIFICANT CHANGE UP (ref 1.8–7.4)
NEUTROPHILS NFR BLD AUTO: 67.9 % — SIGNIFICANT CHANGE UP (ref 43–77)
NEUTROPHILS NFR BLD AUTO: 67.9 % — SIGNIFICANT CHANGE UP (ref 43–77)
NRBC # BLD: 0 /100 WBCS — SIGNIFICANT CHANGE UP (ref 0–0)
NRBC # BLD: 0 /100 WBCS — SIGNIFICANT CHANGE UP (ref 0–0)
PLATELET # BLD AUTO: 313 K/UL — SIGNIFICANT CHANGE UP (ref 150–400)
PLATELET # BLD AUTO: 313 K/UL — SIGNIFICANT CHANGE UP (ref 150–400)
RBC # BLD: 5.12 M/UL — SIGNIFICANT CHANGE UP (ref 3.8–5.2)
RBC # BLD: 5.12 M/UL — SIGNIFICANT CHANGE UP (ref 3.8–5.2)
RBC # FLD: 14.7 % — HIGH (ref 10.3–14.5)
RBC # FLD: 14.7 % — HIGH (ref 10.3–14.5)
WBC # BLD: 9.14 K/UL — SIGNIFICANT CHANGE UP (ref 3.8–10.5)
WBC # BLD: 9.14 K/UL — SIGNIFICANT CHANGE UP (ref 3.8–10.5)
WBC # FLD AUTO: 9.14 K/UL — SIGNIFICANT CHANGE UP (ref 3.8–10.5)
WBC # FLD AUTO: 9.14 K/UL — SIGNIFICANT CHANGE UP (ref 3.8–10.5)

## 2024-01-01 PROCEDURE — 99232 SBSQ HOSP IP/OBS MODERATE 35: CPT

## 2024-01-01 RX ADMIN — Medication 1 DROP(S): at 17:41

## 2024-01-01 RX ADMIN — LIDOCAINE 1 PATCH: 4 CREAM TOPICAL at 02:30

## 2024-01-01 RX ADMIN — Medication 1 DROP(S): at 12:28

## 2024-01-01 RX ADMIN — LIDOCAINE 1 PATCH: 4 CREAM TOPICAL at 07:54

## 2024-01-01 RX ADMIN — Medication 1 DROP(S): at 05:04

## 2024-01-01 RX ADMIN — LIDOCAINE 1 PATCH: 4 CREAM TOPICAL at 15:20

## 2024-01-01 NOTE — PROGRESS NOTE ADULT - SUBJECTIVE AND OBJECTIVE BOX
Patient is a 78y old  Female who presents with a chief complaint of weakness (31 Dec 2023 14:16)      SUBJECTIVE / OVERNIGHT EVENTS:   NO complaints today      ADDITIONAL REVIEW OF SYSTEMS: Negative except for above    MEDICATIONS  (STANDING):  artificial tears (preservative free) Ophthalmic Solution 1 Drop(s) Both EYES four times a day  influenza  Vaccine (HIGH DOSE) 0.7 milliLiter(s) IntraMuscular once  lidocaine   4% Patch 1 Patch Transdermal every 24 hours    MEDICATIONS  (PRN):  acetaminophen     Tablet .. 650 milliGRAM(s) Oral every 6 hours PRN Temp greater or equal to 38C (100.4F), Mild Pain (1 - 3)  aluminum hydroxide/magnesium hydroxide/simethicone Suspension 30 milliLiter(s) Oral every 4 hours PRN Dyspepsia  melatonin 3 milliGRAM(s) Oral at bedtime PRN Insomnia  ondansetron Injectable 4 milliGRAM(s) IV Push every 8 hours PRN Nausea and/or Vomiting      CAPILLARY BLOOD GLUCOSE        I&O's Summary    31 Dec 2023 07:01  -  01 Jan 2024 07:00  --------------------------------------------------------  IN: 420 mL / OUT: 1210 mL / NET: -790 mL    01 Jan 2024 07:01  -  01 Jan 2024 18:38  --------------------------------------------------------  IN: 0 mL / OUT: 100 mL / NET: -100 mL        PHYSICAL EXAM:  Vital Signs Last 24 Hrs  T(C): 36.8 (01 Jan 2024 16:56), Max: 36.8 (31 Dec 2023 21:28)  T(F): 98.2 (01 Jan 2024 16:56), Max: 98.2 (31 Dec 2023 21:28)  HR: 90 (01 Jan 2024 16:56) (75 - 90)  BP: 117/67 (01 Jan 2024 16:56) (109/69 - 127/61)  BP(mean): --  RR: 18 (01 Jan 2024 16:56) (18 - 18)  SpO2: 96% (01 Jan 2024 16:56) (95% - 96%)    Parameters below as of 01 Jan 2024 16:56  Patient On (Oxygen Delivery Method): room air        PHYSICAL EXAM:  GENERAL: NAD, well-developed  HEAD:  Atraumatic, Normocephalic  EYES:  conjunctiva and sclera clear  NECK: Supple, No JVD  CHEST/LUNG: Clear to auscultation bilaterally; No wheeze  HEART: Regular rate and rhythm; No murmurs, rubs, or gallops  ABDOMEN: Soft, Nontender, Nondistended; Bowel sounds present  EXTREMITIES:  2+ Peripheral Pulses, No clubbing, cyanosis, or edema  PSYCH: AAOx2-3  NEUROLOGY: non-focal        LABS:                        13.5   9.14  )-----------( 313      ( 01 Jan 2024 11:58 )             40.8     12-31    139  |  104  |  14  ----------------------------<  146<H>  3.6   |  25  |  0.57    Ca    9.7      31 Dec 2023 10:17    TPro  7.7  /  Alb  4.2  /  TBili  0.3  /  DBili  x   /  AST  16  /  ALT  12  /  AlkPhos  115  12-31    PT/INR - ( 31 Dec 2023 10:17 )   PT: 12.2 sec;   INR: 1.11 ratio               Urinalysis Basic - ( 31 Dec 2023 10:17 )    Color: x / Appearance: x / SG: x / pH: x  Gluc: 146 mg/dL / Ketone: x  / Bili: x / Urobili: x   Blood: x / Protein: x / Nitrite: x   Leuk Esterase: x / RBC: x / WBC x   Sq Epi: x / Non Sq Epi: x / Bacteria: x          RADIOLOGY & ADDITIONAL TESTS:    Imaging Personally Reviewed:    Electrocardiogram Personally Reviewed:    COORDINATION OF CARE:  Care Discussed with Consultants/Other Providers [Y/N]:  Prior or Outpatient Records Reviewed [Y/N]:

## 2024-01-01 NOTE — PROGRESS NOTE ADULT - NSPROGADDITIONALINFOA_GEN_ALL_CORE
Son was updated on 1/1/24    [facility]   martin on TEAMS Son was updated on 1/1/24    [facility]   martin on TEAMS

## 2024-01-01 NOTE — PROGRESS NOTE ADULT - SUBJECTIVE AND OBJECTIVE BOX
Patient seen and examined at bedside.    --Anticoagulation--    T(C): 36.7 (01-01-24 @ 05:15), Max: 37 (12-31-23 @ 17:22)  HR: 75 (01-01-24 @ 05:15) (72 - 80)  BP: 109/69 (01-01-24 @ 05:15) (109/69 - 145/76)  RR: 18 (01-01-24 @ 05:15) (18 - 18)  SpO2: 96% (01-01-24 @ 05:15) (93% - 96%)  Wt(kg): --    Exam: Gen weakness, wide awake, waxes/wanes, Ox2-3, no drift, L pupil 5R, R 4R, limited L eye upward and medial gaze, no facial, MORAN strongly, SILT

## 2024-01-01 NOTE — PROGRESS NOTE ADULT - PROBLEM SELECTOR PLAN 1
-known meningioma. CT head w/o vasogenic edema.   - neurosurgery team has seen pt who recommended MRI of brain which revealed large left cavernous mass with plan for VPS placement on 1/4/24  -  OPHTHALMOLOGY was consulted with no acute findings

## 2024-01-01 NOTE — PROGRESS NOTE ADULT - PROBLEM SELECTOR PLAN 3
-was ordered for CTX in the ED  - Ucx X 2 ( Neg and contaminated urine )   - completed Ceftriaxone TX  since there is plan for surgery --> Neg repeat U/A

## 2024-01-02 ENCOUNTER — APPOINTMENT (OUTPATIENT)
Dept: RADIOLOGY | Facility: CLINIC | Age: 79
End: 2024-01-02

## 2024-01-02 LAB
ANION GAP SERPL CALC-SCNC: 10 MMOL/L — SIGNIFICANT CHANGE UP (ref 5–17)
ANION GAP SERPL CALC-SCNC: 10 MMOL/L — SIGNIFICANT CHANGE UP (ref 5–17)
BUN SERPL-MCNC: 15 MG/DL — SIGNIFICANT CHANGE UP (ref 7–23)
BUN SERPL-MCNC: 15 MG/DL — SIGNIFICANT CHANGE UP (ref 7–23)
CALCIUM SERPL-MCNC: 9.2 MG/DL — SIGNIFICANT CHANGE UP (ref 8.4–10.5)
CALCIUM SERPL-MCNC: 9.2 MG/DL — SIGNIFICANT CHANGE UP (ref 8.4–10.5)
CHLORIDE SERPL-SCNC: 105 MMOL/L — SIGNIFICANT CHANGE UP (ref 96–108)
CHLORIDE SERPL-SCNC: 105 MMOL/L — SIGNIFICANT CHANGE UP (ref 96–108)
CO2 SERPL-SCNC: 17 MMOL/L — LOW (ref 22–31)
CO2 SERPL-SCNC: 17 MMOL/L — LOW (ref 22–31)
CREAT SERPL-MCNC: 0.53 MG/DL — SIGNIFICANT CHANGE UP (ref 0.5–1.3)
CREAT SERPL-MCNC: 0.53 MG/DL — SIGNIFICANT CHANGE UP (ref 0.5–1.3)
EGFR: 96 ML/MIN/1.73M2 — SIGNIFICANT CHANGE UP
EGFR: 96 ML/MIN/1.73M2 — SIGNIFICANT CHANGE UP
GLUCOSE SERPL-MCNC: 95 MG/DL — SIGNIFICANT CHANGE UP (ref 70–99)
GLUCOSE SERPL-MCNC: 95 MG/DL — SIGNIFICANT CHANGE UP (ref 70–99)
HCT VFR BLD CALC: 44.6 % — SIGNIFICANT CHANGE UP (ref 34.5–45)
HCT VFR BLD CALC: 44.6 % — SIGNIFICANT CHANGE UP (ref 34.5–45)
HGB BLD-MCNC: 14.1 G/DL — SIGNIFICANT CHANGE UP (ref 11.5–15.5)
HGB BLD-MCNC: 14.1 G/DL — SIGNIFICANT CHANGE UP (ref 11.5–15.5)
MCHC RBC-ENTMCNC: 26.4 PG — LOW (ref 27–34)
MCHC RBC-ENTMCNC: 26.4 PG — LOW (ref 27–34)
MCHC RBC-ENTMCNC: 31.6 GM/DL — LOW (ref 32–36)
MCHC RBC-ENTMCNC: 31.6 GM/DL — LOW (ref 32–36)
MCV RBC AUTO: 83.5 FL — SIGNIFICANT CHANGE UP (ref 80–100)
MCV RBC AUTO: 83.5 FL — SIGNIFICANT CHANGE UP (ref 80–100)
NRBC # BLD: 0 /100 WBCS — SIGNIFICANT CHANGE UP (ref 0–0)
NRBC # BLD: 0 /100 WBCS — SIGNIFICANT CHANGE UP (ref 0–0)
PLATELET # BLD AUTO: 333 K/UL — SIGNIFICANT CHANGE UP (ref 150–400)
PLATELET # BLD AUTO: 333 K/UL — SIGNIFICANT CHANGE UP (ref 150–400)
POTASSIUM SERPL-MCNC: SIGNIFICANT CHANGE UP MMOL/L (ref 3.5–5.3)
POTASSIUM SERPL-MCNC: SIGNIFICANT CHANGE UP MMOL/L (ref 3.5–5.3)
POTASSIUM SERPL-SCNC: SIGNIFICANT CHANGE UP MMOL/L (ref 3.5–5.3)
POTASSIUM SERPL-SCNC: SIGNIFICANT CHANGE UP MMOL/L (ref 3.5–5.3)
RBC # BLD: 5.34 M/UL — HIGH (ref 3.8–5.2)
RBC # BLD: 5.34 M/UL — HIGH (ref 3.8–5.2)
RBC # FLD: 15 % — HIGH (ref 10.3–14.5)
RBC # FLD: 15 % — HIGH (ref 10.3–14.5)
SODIUM SERPL-SCNC: 132 MMOL/L — LOW (ref 135–145)
SODIUM SERPL-SCNC: 132 MMOL/L — LOW (ref 135–145)
WBC # BLD: 10.43 K/UL — SIGNIFICANT CHANGE UP (ref 3.8–10.5)
WBC # BLD: 10.43 K/UL — SIGNIFICANT CHANGE UP (ref 3.8–10.5)
WBC # FLD AUTO: 10.43 K/UL — SIGNIFICANT CHANGE UP (ref 3.8–10.5)
WBC # FLD AUTO: 10.43 K/UL — SIGNIFICANT CHANGE UP (ref 3.8–10.5)

## 2024-01-02 PROCEDURE — 99233 SBSQ HOSP IP/OBS HIGH 50: CPT

## 2024-01-02 RX ADMIN — LIDOCAINE 1 PATCH: 4 CREAM TOPICAL at 14:00

## 2024-01-02 RX ADMIN — LIDOCAINE 1 PATCH: 4 CREAM TOPICAL at 02:26

## 2024-01-02 RX ADMIN — Medication 1 DROP(S): at 01:04

## 2024-01-02 RX ADMIN — Medication 1 DROP(S): at 19:05

## 2024-01-02 RX ADMIN — Medication 1 DROP(S): at 06:06

## 2024-01-02 RX ADMIN — LIDOCAINE 1 PATCH: 4 CREAM TOPICAL at 07:00

## 2024-01-02 RX ADMIN — Medication 1 DROP(S): at 11:46

## 2024-01-02 NOTE — DIETITIAN INITIAL EVALUATION ADULT - PHYSCIAL ASSESSMENT
Weight Hx Per:  - Source: patient   - UBW: unknown   - Reported weight changes: Pt denies changes in weight     Weight Hx Per [facility] HIE:  - 121 pounds (9/28/2020)  - 125 pounds (4/5/2021)  - 113 pounds (7/20/2023)    Current Admission Weights:  - Dosing weight: 115.1 pounds/52.2 kg (12/26)  - RD obtained bed scale weight: 119.1 pounds (01/02)    Weight Change:  - none     **  Will continue to monitor weight trends as available/able.     IBW: 105 pounds   %IBW: 110% Weight Hx Per:  - Source: patient   - UBW: unknown   - Reported weight changes: Pt denies changes in weight     Weight Hx Per [facility] HIE:  - 121 pounds (9/28/2020)  - 125 pounds (4/5/2021)  - 113 pounds (7/20/2023)    Current Admission Weights:  - Dosing weight: 115.1 pounds/52.2 kg (12/26)  - RD obtained bed scale weight: 119.1 pounds (01/02)    Weight Change:  - none     **  Will continue to monitor weight trends as available/able.     IBW: 105 pounds   %IBW: 110%

## 2024-01-02 NOTE — DIETITIAN INITIAL EVALUATION ADULT - PERTINENT MEDS FT
MEDICATIONS  (STANDING):  artificial tears (preservative free) Ophthalmic Solution 1 Drop(s) Both EYES four times a day  influenza  Vaccine (HIGH DOSE) 0.7 milliLiter(s) IntraMuscular once  lidocaine   4% Patch 1 Patch Transdermal every 24 hours    MEDICATIONS  (PRN):  acetaminophen     Tablet .. 650 milliGRAM(s) Oral every 6 hours PRN Temp greater or equal to 38C (100.4F), Mild Pain (1 - 3)  aluminum hydroxide/magnesium hydroxide/simethicone Suspension 30 milliLiter(s) Oral every 4 hours PRN Dyspepsia  melatonin 3 milliGRAM(s) Oral at bedtime PRN Insomnia  ondansetron Injectable 4 milliGRAM(s) IV Push every 8 hours PRN Nausea and/or Vomiting

## 2024-01-02 NOTE — PROGRESS NOTE ADULT - SUBJECTIVE AND OBJECTIVE BOX
[facility] Division of Hospital Medicine  Anthony Castro MD  Available via MS Teams    SUBJECTIVE / OVERNIGHT EVENTS:  patient was seen and examined at bedside this am. no acute events overnight, denies any new complaints     ADDITIONAL REVIEW OF SYSTEMS:    MEDICATIONS  (STANDING):  artificial tears (preservative free) Ophthalmic Solution 1 Drop(s) Both EYES four times a day  influenza  Vaccine (HIGH DOSE) 0.7 milliLiter(s) IntraMuscular once  lidocaine   4% Patch 1 Patch Transdermal every 24 hours    MEDICATIONS  (PRN):  acetaminophen     Tablet .. 650 milliGRAM(s) Oral every 6 hours PRN Temp greater or equal to 38C (100.4F), Mild Pain (1 - 3)  aluminum hydroxide/magnesium hydroxide/simethicone Suspension 30 milliLiter(s) Oral every 4 hours PRN Dyspepsia  melatonin 3 milliGRAM(s) Oral at bedtime PRN Insomnia  ondansetron Injectable 4 milliGRAM(s) IV Push every 8 hours PRN Nausea and/or Vomiting      I&O's Summary    01 Jan 2024 07:01  -  02 Jan 2024 07:00  --------------------------------------------------------  IN: 237 mL / OUT: 300 mL / NET: -63 mL    02 Jan 2024 07:01  -  02 Jan 2024 12:22  --------------------------------------------------------  IN: 0 mL / OUT: 110 mL / NET: -110 mL        PHYSICAL EXAM:  Vital Signs Last 24 Hrs  T(C): 36.9 (02 Jan 2024 05:18), Max: 36.9 (02 Jan 2024 05:18)  T(F): 98.4 (02 Jan 2024 05:18), Max: 98.4 (02 Jan 2024 05:18)  HR: 75 (02 Jan 2024 05:18) (75 - 90)  BP: 112/71 (02 Jan 2024 05:18) (112/71 - 129/58)  BP(mean): --  RR: 16 (02 Jan 2024 05:18) (16 - 18)  SpO2: 95% (02 Jan 2024 05:18) (95% - 96%)    Parameters below as of 02 Jan 2024 05:18  Patient On (Oxygen Delivery Method): room air    GENERAL: NAD, well-developed  HEAD:  Atraumatic, Normocephalic  EYES:  conjunctiva and sclera clear  NECK: Supple, No JVD  CHEST/LUNG: Clear to auscultation bilaterally; No wheeze  HEART: Regular rate and rhythm; No murmurs, rubs, or gallops  ABDOMEN: Soft, Nontender, Nondistended; Bowel sounds present  EXTREMITIES:  2+ Peripheral Pulses, No clubbing, cyanosis, or edema  PSYCH: AAOx2-3  NEUROLOGY: non-focal      LABS:                        13.5   9.14  )-----------( 313      ( 01 Jan 2024 11:58 )             40.8                         RADIOLOGY & ADDITIONAL TESTS:  New Imaging Personally Reviewed Today:  New Electrocardiogram Personally Reviewed Today:  Other Results Reviewed Today:   Prior or Outpatient Records Reviewed Today with Summary:    COORDINATION OF CARE:  Consultant Communication and Details of Discussion (where applicable):     [facility] Division of Hospital Medicine  Anthony Castro MD  Available via MS Teams    SUBJECTIVE / OVERNIGHT EVENTS:  patient was seen and examined at bedside this am. no acute events overnight, denies any new complaints     ADDITIONAL REVIEW OF SYSTEMS:    MEDICATIONS  (STANDING):  artificial tears (preservative free) Ophthalmic Solution 1 Drop(s) Both EYES four times a day  influenza  Vaccine (HIGH DOSE) 0.7 milliLiter(s) IntraMuscular once  lidocaine   4% Patch 1 Patch Transdermal every 24 hours    MEDICATIONS  (PRN):  acetaminophen     Tablet .. 650 milliGRAM(s) Oral every 6 hours PRN Temp greater or equal to 38C (100.4F), Mild Pain (1 - 3)  aluminum hydroxide/magnesium hydroxide/simethicone Suspension 30 milliLiter(s) Oral every 4 hours PRN Dyspepsia  melatonin 3 milliGRAM(s) Oral at bedtime PRN Insomnia  ondansetron Injectable 4 milliGRAM(s) IV Push every 8 hours PRN Nausea and/or Vomiting      I&O's Summary    01 Jan 2024 07:01  -  02 Jan 2024 07:00  --------------------------------------------------------  IN: 237 mL / OUT: 300 mL / NET: -63 mL    02 Jan 2024 07:01  -  02 Jan 2024 12:22  --------------------------------------------------------  IN: 0 mL / OUT: 110 mL / NET: -110 mL        PHYSICAL EXAM:  Vital Signs Last 24 Hrs  T(C): 36.9 (02 Jan 2024 05:18), Max: 36.9 (02 Jan 2024 05:18)  T(F): 98.4 (02 Jan 2024 05:18), Max: 98.4 (02 Jan 2024 05:18)  HR: 75 (02 Jan 2024 05:18) (75 - 90)  BP: 112/71 (02 Jan 2024 05:18) (112/71 - 129/58)  BP(mean): --  RR: 16 (02 Jan 2024 05:18) (16 - 18)  SpO2: 95% (02 Jan 2024 05:18) (95% - 96%)    Parameters below as of 02 Jan 2024 05:18  Patient On (Oxygen Delivery Method): room air    GENERAL: NAD, well-developed  HEAD:  Atraumatic, Normocephalic  EYES:  conjunctiva and sclera clear  NECK: Supple, No JVD  CHEST/LUNG: Clear to auscultation bilaterally; No wheeze  HEART: Regular rate and rhythm; No murmurs, rubs, or gallops  ABDOMEN: Soft, Nontender, Nondistended; Bowel sounds present  EXTREMITIES:  2+ Peripheral Pulses, No clubbing, cyanosis, or edema  PSYCH: AAOx2-3  NEUROLOGY: non-focal      LABS:                        13.5   9.14  )-----------( 313      ( 01 Jan 2024 11:58 )             40.8                         RADIOLOGY & ADDITIONAL TESTS:  New Imaging Personally Reviewed Today:  New Electrocardiogram Personally Reviewed Today:  Other Results Reviewed Today:   Prior or Outpatient Records Reviewed Today with Summary:    COORDINATION OF CARE:  Consultant Communication and Details of Discussion (where applicable):

## 2024-01-02 NOTE — DIETITIAN INITIAL EVALUATION ADULT - ADD RECOMMEND
1) Continue current diet order: regular diet   2) Monitor and encourage PO intake. Encourage use of daily menus. Honor dietary preferences as expressed as able.   3) Monitor weights, labs, hydration status, bowels, and skin integrity.

## 2024-01-02 NOTE — DIETITIAN INITIAL EVALUATION ADULT - NSFNSGIIOFT_GEN_A_CORE
- Pt denies nausea, vomiting, diarrhea, or constipation.   - Last BM: 01/01; not currently ordered for bowel regimen

## 2024-01-02 NOTE — PROGRESS NOTE ADULT - SUBJECTIVE AND OBJECTIVE BOX
Patient seen and examined at bedside.    --Anticoagulation--    T(C): 36.9 (01-02-24 @ 05:18), Max: 36.9 (01-02-24 @ 05:18)  HR: 75 (01-02-24 @ 05:18) (75 - 90)  BP: 112/71 (01-02-24 @ 05:18) (112/71 - 129/58)  RR: 16 (01-02-24 @ 05:18) (16 - 18)  SpO2: 95% (01-02-24 @ 05:18) (95% - 96%)  Wt(kg): --    Exam: Gen weakness, wide awake, waxes/wanes, Ox2-3, no drift, L pupil 5R, R 4R, limited L eye upward and medial gaze, no facial, MORAN strongly, SILT

## 2024-01-02 NOTE — PROGRESS NOTE ADULT - ASSESSMENT
-Preop for VPS 1/4  -CISS MRI with Cine flow study done - hydro w transependymal flow   -optho - no papilledema   -UTI tx per primary -Preop for VPS 1/4  -CISS MRI with Cine flow study done - hydro w transependymal flow   -optho - no papilledema

## 2024-01-02 NOTE — DIETITIAN INITIAL EVALUATION ADULT - ENERGY INTAKE
-Intake: Pt reports fair appetite/PO intake; consuming ~75% of meals. Pt is not amenable to receiving oral nutritional supplements at this time.  Fair (50-75%)

## 2024-01-02 NOTE — DIETITIAN INITIAL EVALUATION ADULT - ORAL INTAKE PTA/DIET HISTORY
Pt reports having a fair appetite and PO intake PTA; reports eating 2 meals a day at baseline; consuming ~75% of most meals. Follows a regular diet. Pt denies any known food allergies or intolerances. Pt denies any micronutrient supplementation at home. Denies any difficulty chewing/swallowing at this time.

## 2024-01-02 NOTE — DIETITIAN INITIAL EVALUATION ADULT - OTHER CALCULATIONS
Based on dosing weight 52.2 kg; deferred to team 
Ears: no ear pain and no hearing problems.Nose: no nasal congestion and no nasal drainage.Mouth/Throat: no dysphagia, no hoarseness and no throat pain.Neck: no lumps, no pain, no stiffness and no swollen glands.

## 2024-01-02 NOTE — DIETITIAN INITIAL EVALUATION ADULT - REASON INDICATOR FOR ASSESSMENT
Nutrition consult warranted for: MST score 2 or more   Information obtained from: electronic medical record and patient. Of note, Pt is Yi speaking; RD is fluent in this language.   Chart reviewed, events noted.  Nutrition consult warranted for: MST score 2 or more   Information obtained from: electronic medical record and patient. Of note, Pt is Turkmen speaking; RD is fluent in this language.   Chart reviewed, events noted.  Nutrition assessment warranted for: length of stay on [facility]  Information obtained from: electronic medical record and patient. Of note, Pt is Finnish speaking; RD is fluent in this language.   Chart reviewed, events noted.  Nutrition assessment warranted for: length of stay on [facility]  Information obtained from: electronic medical record and patient. Of note, Pt is New Zealander speaking; RD is fluent in this language.   Chart reviewed, events noted.

## 2024-01-03 ENCOUNTER — TRANSCRIPTION ENCOUNTER (OUTPATIENT)
Age: 79
End: 2024-01-03

## 2024-01-03 LAB
ANION GAP SERPL CALC-SCNC: 11 MMOL/L — SIGNIFICANT CHANGE UP (ref 5–17)
ANION GAP SERPL CALC-SCNC: 11 MMOL/L — SIGNIFICANT CHANGE UP (ref 5–17)
APTT BLD: 31.6 SEC — SIGNIFICANT CHANGE UP (ref 24.5–35.6)
APTT BLD: 31.6 SEC — SIGNIFICANT CHANGE UP (ref 24.5–35.6)
BLD GP AB SCN SERPL QL: NEGATIVE — SIGNIFICANT CHANGE UP
BLD GP AB SCN SERPL QL: NEGATIVE — SIGNIFICANT CHANGE UP
BUN SERPL-MCNC: 16 MG/DL — SIGNIFICANT CHANGE UP (ref 7–23)
BUN SERPL-MCNC: 16 MG/DL — SIGNIFICANT CHANGE UP (ref 7–23)
CALCIUM SERPL-MCNC: 9.2 MG/DL — SIGNIFICANT CHANGE UP (ref 8.4–10.5)
CALCIUM SERPL-MCNC: 9.2 MG/DL — SIGNIFICANT CHANGE UP (ref 8.4–10.5)
CHLORIDE SERPL-SCNC: 105 MMOL/L — SIGNIFICANT CHANGE UP (ref 96–108)
CHLORIDE SERPL-SCNC: 105 MMOL/L — SIGNIFICANT CHANGE UP (ref 96–108)
CO2 SERPL-SCNC: 24 MMOL/L — SIGNIFICANT CHANGE UP (ref 22–31)
CO2 SERPL-SCNC: 24 MMOL/L — SIGNIFICANT CHANGE UP (ref 22–31)
CREAT SERPL-MCNC: 0.61 MG/DL — SIGNIFICANT CHANGE UP (ref 0.5–1.3)
CREAT SERPL-MCNC: 0.61 MG/DL — SIGNIFICANT CHANGE UP (ref 0.5–1.3)
EGFR: 93 ML/MIN/1.73M2 — SIGNIFICANT CHANGE UP
EGFR: 93 ML/MIN/1.73M2 — SIGNIFICANT CHANGE UP
GLUCOSE SERPL-MCNC: 98 MG/DL — SIGNIFICANT CHANGE UP (ref 70–99)
GLUCOSE SERPL-MCNC: 98 MG/DL — SIGNIFICANT CHANGE UP (ref 70–99)
HCT VFR BLD CALC: 42.4 % — SIGNIFICANT CHANGE UP (ref 34.5–45)
HCT VFR BLD CALC: 42.4 % — SIGNIFICANT CHANGE UP (ref 34.5–45)
HGB BLD-MCNC: 13.8 G/DL — SIGNIFICANT CHANGE UP (ref 11.5–15.5)
HGB BLD-MCNC: 13.8 G/DL — SIGNIFICANT CHANGE UP (ref 11.5–15.5)
INR BLD: 1.15 RATIO — SIGNIFICANT CHANGE UP (ref 0.85–1.18)
INR BLD: 1.15 RATIO — SIGNIFICANT CHANGE UP (ref 0.85–1.18)
MCHC RBC-ENTMCNC: 26.2 PG — LOW (ref 27–34)
MCHC RBC-ENTMCNC: 26.2 PG — LOW (ref 27–34)
MCHC RBC-ENTMCNC: 32.5 GM/DL — SIGNIFICANT CHANGE UP (ref 32–36)
MCHC RBC-ENTMCNC: 32.5 GM/DL — SIGNIFICANT CHANGE UP (ref 32–36)
MCV RBC AUTO: 80.6 FL — SIGNIFICANT CHANGE UP (ref 80–100)
MCV RBC AUTO: 80.6 FL — SIGNIFICANT CHANGE UP (ref 80–100)
NRBC # BLD: 0 /100 WBCS — SIGNIFICANT CHANGE UP (ref 0–0)
NRBC # BLD: 0 /100 WBCS — SIGNIFICANT CHANGE UP (ref 0–0)
PLATELET # BLD AUTO: 334 K/UL — SIGNIFICANT CHANGE UP (ref 150–400)
PLATELET # BLD AUTO: 334 K/UL — SIGNIFICANT CHANGE UP (ref 150–400)
POTASSIUM SERPL-MCNC: 4.1 MMOL/L — SIGNIFICANT CHANGE UP (ref 3.5–5.3)
POTASSIUM SERPL-MCNC: 4.1 MMOL/L — SIGNIFICANT CHANGE UP (ref 3.5–5.3)
POTASSIUM SERPL-SCNC: 4.1 MMOL/L — SIGNIFICANT CHANGE UP (ref 3.5–5.3)
POTASSIUM SERPL-SCNC: 4.1 MMOL/L — SIGNIFICANT CHANGE UP (ref 3.5–5.3)
PROTHROM AB SERPL-ACNC: 12.6 SEC — SIGNIFICANT CHANGE UP (ref 9.5–13)
PROTHROM AB SERPL-ACNC: 12.6 SEC — SIGNIFICANT CHANGE UP (ref 9.5–13)
RBC # BLD: 5.26 M/UL — HIGH (ref 3.8–5.2)
RBC # BLD: 5.26 M/UL — HIGH (ref 3.8–5.2)
RBC # FLD: 14.6 % — HIGH (ref 10.3–14.5)
RBC # FLD: 14.6 % — HIGH (ref 10.3–14.5)
RH IG SCN BLD-IMP: POSITIVE — SIGNIFICANT CHANGE UP
RH IG SCN BLD-IMP: POSITIVE — SIGNIFICANT CHANGE UP
SODIUM SERPL-SCNC: 140 MMOL/L — SIGNIFICANT CHANGE UP (ref 135–145)
SODIUM SERPL-SCNC: 140 MMOL/L — SIGNIFICANT CHANGE UP (ref 135–145)
WBC # BLD: 9.83 K/UL — SIGNIFICANT CHANGE UP (ref 3.8–10.5)
WBC # BLD: 9.83 K/UL — SIGNIFICANT CHANGE UP (ref 3.8–10.5)
WBC # FLD AUTO: 9.83 K/UL — SIGNIFICANT CHANGE UP (ref 3.8–10.5)
WBC # FLD AUTO: 9.83 K/UL — SIGNIFICANT CHANGE UP (ref 3.8–10.5)

## 2024-01-03 PROCEDURE — 99232 SBSQ HOSP IP/OBS MODERATE 35: CPT

## 2024-01-03 RX ORDER — SODIUM CHLORIDE 9 MG/ML
1000 INJECTION, SOLUTION INTRAVENOUS
Refills: 0 | Status: DISCONTINUED | OUTPATIENT
Start: 2024-01-03 | End: 2024-01-04

## 2024-01-03 RX ORDER — SODIUM CHLORIDE 9 MG/ML
1000 INJECTION, SOLUTION INTRAVENOUS
Refills: 0 | Status: DISCONTINUED | OUTPATIENT
Start: 2024-01-03 | End: 2024-01-03

## 2024-01-03 RX ADMIN — Medication 1 DROP(S): at 05:46

## 2024-01-03 RX ADMIN — LIDOCAINE 1 PATCH: 4 CREAM TOPICAL at 02:55

## 2024-01-03 RX ADMIN — Medication 1 DROP(S): at 17:24

## 2024-01-03 RX ADMIN — LIDOCAINE 1 PATCH: 4 CREAM TOPICAL at 14:57

## 2024-01-03 RX ADMIN — Medication 1 DROP(S): at 00:02

## 2024-01-03 RX ADMIN — Medication 1 DROP(S): at 12:03

## 2024-01-03 RX ADMIN — Medication 1 DROP(S): at 23:32

## 2024-01-03 NOTE — PROGRESS NOTE ADULT - SUBJECTIVE AND OBJECTIVE BOX
Patient seen and examined at bedside.    --Anticoagulation--    T(C): 36.7 (01-03-24 @ 06:07), Max: 36.8 (01-02-24 @ 13:15)  HR: 80 (01-03-24 @ 06:07) (71 - 80)  BP: 109/60 (01-03-24 @ 06:07) (109/60 - 116/47)  RR: 17 (01-03-24 @ 06:07) (16 - 17)  SpO2: 95% (01-03-24 @ 06:07) (95% - 97%)  Wt(kg): --    Exam: Gen weakness, wide awake, waxes/wanes, Ox2-3, no drift, L pupil 5R, R 4R, limited L eye upward and medial gaze, no facial, MORAN strongly, SILT

## 2024-01-03 NOTE — PROGRESS NOTE ADULT - ASSESSMENT
- Preop for VPS 1/4  - CISS MRI with Cine flow study done - hydro w transependymal flow   - optho - no papilledema

## 2024-01-03 NOTE — PROGRESS NOTE ADULT - SUBJECTIVE AND OBJECTIVE BOX
[facility] Division of Hospital Medicine  Anthony Catsro MD  Available via MS Teams    SUBJECTIVE / OVERNIGHT EVENTS:    ADDITIONAL REVIEW OF SYSTEMS:    MEDICATIONS  (STANDING):  artificial tears (preservative free) Ophthalmic Solution 1 Drop(s) Both EYES four times a day  influenza  Vaccine (HIGH DOSE) 0.7 milliLiter(s) IntraMuscular once  lactated ringers. 1000 milliLiter(s) (50 mL/Hr) IV Continuous <Continuous>  lidocaine   4% Patch 1 Patch Transdermal every 24 hours    MEDICATIONS  (PRN):  acetaminophen     Tablet .. 650 milliGRAM(s) Oral every 6 hours PRN Temp greater or equal to 38C (100.4F), Mild Pain (1 - 3)  aluminum hydroxide/magnesium hydroxide/simethicone Suspension 30 milliLiter(s) Oral every 4 hours PRN Dyspepsia  melatonin 3 milliGRAM(s) Oral at bedtime PRN Insomnia  ondansetron Injectable 4 milliGRAM(s) IV Push every 8 hours PRN Nausea and/or Vomiting      I&O's Summary    02 Jan 2024 07:01  -  03 Jan 2024 07:00  --------------------------------------------------------  IN: 118 mL / OUT: 1110 mL / NET: -992 mL        PHYSICAL EXAM:  Vital Signs Last 24 Hrs  T(C): 36.2 (03 Jan 2024 12:07), Max: 36.8 (02 Jan 2024 17:26)  T(F): 97.2 (03 Jan 2024 12:07), Max: 98.3 (02 Jan 2024 17:26)  HR: 73 (03 Jan 2024 12:07) (71 - 80)  BP: 117/76 (03 Jan 2024 12:07) (109/60 - 117/76)  BP(mean): --  RR: 17 (03 Jan 2024 12:07) (17 - 17)  SpO2: 95% (03 Jan 2024 12:07) (95% - 96%)    Parameters below as of 03 Jan 2024 12:07  Patient On (Oxygen Delivery Method): room air      GENERAL: NAD, well-developed  HEAD:  Atraumatic, Normocephalic  EYES:  conjunctiva and sclera clear  NECK: Supple, No JVD  CHEST/LUNG: Clear to auscultation bilaterally; No wheeze  HEART: Regular rate and rhythm; No murmurs, rubs, or gallops  ABDOMEN: Soft, Nontender, Nondistended; Bowel sounds present  EXTREMITIES:  2+ Peripheral Pulses, No clubbing, cyanosis, or edema  PSYCH: AAOx2-3  NEUROLOGY: non-focal    LABS:                        13.8   9.83  )-----------( 334      ( 03 Jan 2024 09:38 )             42.4     01-03    140  |  105  |  16  ----------------------------<  98  4.1   |  24  |  0.61    Ca    9.2      03 Jan 2024 09:38            Urinalysis Basic - ( 03 Jan 2024 09:38 )    Color: x / Appearance: x / SG: x / pH: x  Gluc: 98 mg/dL / Ketone: x  / Bili: x / Urobili: x   Blood: x / Protein: x / Nitrite: x   Leuk Esterase: x / RBC: x / WBC x   Sq Epi: x / Non Sq Epi: x / Bacteria: x            RADIOLOGY & ADDITIONAL TESTS:  New Imaging Personally Reviewed Today:  New Electrocardiogram Personally Reviewed Today:  Other Results Reviewed Today:   Prior or Outpatient Records Reviewed Today with Summary:    COORDINATION OF CARE:  Consultant Communication and Details of Discussion (where applicable):     [facility] Division of Hospital Medicine  Anthony Castro MD  Available via MS Teams    SUBJECTIVE / OVERNIGHT EVENTS:    ADDITIONAL REVIEW OF SYSTEMS:    MEDICATIONS  (STANDING):  artificial tears (preservative free) Ophthalmic Solution 1 Drop(s) Both EYES four times a day  influenza  Vaccine (HIGH DOSE) 0.7 milliLiter(s) IntraMuscular once  lactated ringers. 1000 milliLiter(s) (50 mL/Hr) IV Continuous <Continuous>  lidocaine   4% Patch 1 Patch Transdermal every 24 hours    MEDICATIONS  (PRN):  acetaminophen     Tablet .. 650 milliGRAM(s) Oral every 6 hours PRN Temp greater or equal to 38C (100.4F), Mild Pain (1 - 3)  aluminum hydroxide/magnesium hydroxide/simethicone Suspension 30 milliLiter(s) Oral every 4 hours PRN Dyspepsia  melatonin 3 milliGRAM(s) Oral at bedtime PRN Insomnia  ondansetron Injectable 4 milliGRAM(s) IV Push every 8 hours PRN Nausea and/or Vomiting      I&O's Summary    02 Jan 2024 07:01  -  03 Jan 2024 07:00  --------------------------------------------------------  IN: 118 mL / OUT: 1110 mL / NET: -992 mL        PHYSICAL EXAM:  Vital Signs Last 24 Hrs  T(C): 36.2 (03 Jan 2024 12:07), Max: 36.8 (02 Jan 2024 17:26)  T(F): 97.2 (03 Jan 2024 12:07), Max: 98.3 (02 Jan 2024 17:26)  HR: 73 (03 Jan 2024 12:07) (71 - 80)  BP: 117/76 (03 Jan 2024 12:07) (109/60 - 117/76)  BP(mean): --  RR: 17 (03 Jan 2024 12:07) (17 - 17)  SpO2: 95% (03 Jan 2024 12:07) (95% - 96%)    Parameters below as of 03 Jan 2024 12:07  Patient On (Oxygen Delivery Method): room air      GENERAL: NAD, well-developed  HEAD:  Atraumatic, Normocephalic  EYES:  conjunctiva and sclera clear  NECK: Supple, No JVD  CHEST/LUNG: Clear to auscultation bilaterally; No wheeze  HEART: Regular rate and rhythm; No murmurs, rubs, or gallops  ABDOMEN: Soft, Nontender, Nondistended; Bowel sounds present  EXTREMITIES:  2+ Peripheral Pulses, No clubbing, cyanosis, or edema  PSYCH: AAOx2-3  NEUROLOGY: non-focal    LABS:                        13.8   9.83  )-----------( 334      ( 03 Jan 2024 09:38 )             42.4     01-03    140  |  105  |  16  ----------------------------<  98  4.1   |  24  |  0.61    Ca    9.2      03 Jan 2024 09:38            Urinalysis Basic - ( 03 Jan 2024 09:38 )    Color: x / Appearance: x / SG: x / pH: x  Gluc: 98 mg/dL / Ketone: x  / Bili: x / Urobili: x   Blood: x / Protein: x / Nitrite: x   Leuk Esterase: x / RBC: x / WBC x   Sq Epi: x / Non Sq Epi: x / Bacteria: x            RADIOLOGY & ADDITIONAL TESTS:  New Imaging Personally Reviewed Today:  New Electrocardiogram Personally Reviewed Today:  Other Results Reviewed Today:   Prior or Outpatient Records Reviewed Today with Summary:    COORDINATION OF CARE:  Consultant Communication and Details of Discussion (where applicable):

## 2024-01-04 ENCOUNTER — TRANSCRIPTION ENCOUNTER (OUTPATIENT)
Age: 79
End: 2024-01-04

## 2024-01-04 ENCOUNTER — APPOINTMENT (OUTPATIENT)
Dept: NEUROSURGERY | Facility: HOSPITAL | Age: 79
End: 2024-01-04

## 2024-01-04 PROCEDURE — 62223 ESTABLISH BRAIN CAVITY SHUNT: CPT | Mod: 62

## 2024-01-04 PROCEDURE — 61781 SCAN PROC CRANIAL INTRA: CPT

## 2024-01-04 PROCEDURE — 99232 SBSQ HOSP IP/OBS MODERATE 35: CPT

## 2024-01-04 PROCEDURE — 70450 CT HEAD/BRAIN W/O DYE: CPT | Mod: 26

## 2024-01-04 DEVICE — SCREW UN3 AXS SELF DRILL 1.5X4MM: Type: IMPLANTABLE DEVICE | Site: RIGHT | Status: FUNCTIONAL

## 2024-01-04 DEVICE — SURGIFOAM PAD 8CM X 12.5CM X 10MM (100): Type: IMPLANTABLE DEVICE | Site: RIGHT | Status: FUNCTIONAL

## 2024-01-04 DEVICE — BACTISEAL SHUNT CATH KIT WITH BARIUM: Type: IMPLANTABLE DEVICE | Site: RIGHT | Status: FUNCTIONAL

## 2024-01-04 DEVICE — VLV CERTAS PLUS INLINE SIPHON: Type: IMPLANTABLE DEVICE | Site: RIGHT | Status: FUNCTIONAL

## 2024-01-04 DEVICE — PLATE LRG GAP SHUNT 14MM: Type: IMPLANTABLE DEVICE | Site: RIGHT | Status: FUNCTIONAL

## 2024-01-04 DEVICE — SCREW 1.5X5MM: Type: IMPLANTABLE DEVICE | Site: RIGHT | Status: FUNCTIONAL

## 2024-01-04 RX ORDER — OXYCODONE HYDROCHLORIDE 5 MG/1
5 TABLET ORAL EVERY 4 HOURS
Refills: 0 | Status: DISCONTINUED | OUTPATIENT
Start: 2024-01-04 | End: 2024-01-08

## 2024-01-04 RX ORDER — OXYCODONE HYDROCHLORIDE 5 MG/1
10 TABLET ORAL EVERY 4 HOURS
Refills: 0 | Status: DISCONTINUED | OUTPATIENT
Start: 2024-01-04 | End: 2024-01-08

## 2024-01-04 RX ORDER — CEFTRIAXONE 500 MG/1
500 INJECTION, POWDER, FOR SOLUTION INTRAMUSCULAR; INTRAVENOUS EVERY 12 HOURS
Refills: 0 | Status: DISCONTINUED | OUTPATIENT
Start: 2024-01-04 | End: 2024-01-04

## 2024-01-04 RX ORDER — CEFTRIAXONE 500 MG/1
1000 INJECTION, POWDER, FOR SOLUTION INTRAMUSCULAR; INTRAVENOUS EVERY 24 HOURS
Refills: 0 | Status: DISCONTINUED | OUTPATIENT
Start: 2024-01-04 | End: 2024-01-10

## 2024-01-04 RX ORDER — SODIUM CHLORIDE 9 MG/ML
1000 INJECTION INTRAMUSCULAR; INTRAVENOUS; SUBCUTANEOUS
Refills: 0 | Status: DISCONTINUED | OUTPATIENT
Start: 2024-01-04 | End: 2024-01-06

## 2024-01-04 RX ORDER — HYDROMORPHONE HYDROCHLORIDE 2 MG/ML
0.25 INJECTION INTRAMUSCULAR; INTRAVENOUS; SUBCUTANEOUS
Refills: 0 | Status: DISCONTINUED | OUTPATIENT
Start: 2024-01-04 | End: 2024-01-04

## 2024-01-04 RX ORDER — ACETAMINOPHEN 500 MG
1000 TABLET ORAL EVERY 6 HOURS
Refills: 0 | Status: DISCONTINUED | OUTPATIENT
Start: 2024-01-04 | End: 2024-01-10

## 2024-01-04 RX ORDER — ONDANSETRON 8 MG/1
4 TABLET, FILM COATED ORAL EVERY 6 HOURS
Refills: 0 | Status: DISCONTINUED | OUTPATIENT
Start: 2024-01-04 | End: 2024-01-04

## 2024-01-04 RX ORDER — FENTANYL CITRATE 50 UG/ML
25 INJECTION INTRAVENOUS
Refills: 0 | Status: DISCONTINUED | OUTPATIENT
Start: 2024-01-04 | End: 2024-01-04

## 2024-01-04 RX ORDER — VANCOMYCIN HCL 1 G
750 VIAL (EA) INTRAVENOUS EVERY 12 HOURS
Refills: 0 | Status: COMPLETED | OUTPATIENT
Start: 2024-01-04 | End: 2024-01-06

## 2024-01-04 RX ADMIN — Medication 1 DROP(S): at 05:26

## 2024-01-04 RX ADMIN — SODIUM CHLORIDE 75 MILLILITER(S): 9 INJECTION, SOLUTION INTRAVENOUS at 05:26

## 2024-01-04 RX ADMIN — SODIUM CHLORIDE 75 MILLILITER(S): 9 INJECTION INTRAMUSCULAR; INTRAVENOUS; SUBCUTANEOUS at 20:30

## 2024-01-04 RX ADMIN — LIDOCAINE 1 PATCH: 4 CREAM TOPICAL at 03:15

## 2024-01-04 NOTE — PRE PROCEDURE NOTE - PRE PROCEDURE EVALUATION
Exam: Gen weakness, wide awake, waxes/wanes, Ox2-3, no drift, L pupil 5R, R 4R, limited L eye upward and medial gaze, no facial, MORAN strongly, SILT

## 2024-01-04 NOTE — PROGRESS NOTE ADULT - SUBJECTIVE AND OBJECTIVE BOX
Patient seen and examined s/p L VPS with Certas at 6    Doing well. Awake, alert, oriented x 3. MORAN strongly. SILT. Wound intact.    T(C): 36.8 (01-04-24 @ 18:00), Max: 36.8 (01-04-24 @ 18:00)  HR: 83 (01-04-24 @ 19:00) (69 - 103)  BP: 122/57 (01-04-24 @ 19:00) (108/58 - 135/78)  RR: 16 (01-04-24 @ 19:00) (16 - 18)  SpO2: 95% (01-04-24 @ 19:00) (93% - 97%)                          13.8   9.83  )-----------( 334      ( 03 Jan 2024 09:38 )             42.4     01-03    140  |  105  |  16  ----------------------------<  98  4.1   |  24  |  0.61    Ca    9.2      03 Jan 2024 09:38      PT/INR - ( 03 Jan 2024 17:32 )   PT: 12.6 sec;   INR: 1.15 ratio         PTT - ( 03 Jan 2024 17:32 )  PTT:31.6 sec  Urinalysis Basic - ( 03 Jan 2024 09:38 )    Color: x / Appearance: x / SG: x / pH: x  Gluc: 98 mg/dL / Ketone: x  / Bili: x / Urobili: x   Blood: x / Protein: x / Nitrite: x   Leuk Esterase: x / RBC: x / WBC x   Sq Epi: x / Non Sq Epi: x / Bacteria: x          CAPILLARY BLOOD GLUCOSE

## 2024-01-04 NOTE — PROGRESS NOTE ADULT - SUBJECTIVE AND OBJECTIVE BOX
[facility] Division of Hospital Medicine  Anthony Castro MD  Available via MS Teams    SUBJECTIVE / OVERNIGHT EVENTS:  no acute events overnight    ADDITIONAL REVIEW OF SYSTEMS:    MEDICATIONS  (STANDING):  cefTRIAXone   IVPB 500 milliGRAM(s) IV Intermittent every 12 hours  influenza  Vaccine (HIGH DOSE) 0.7 milliLiter(s) IntraMuscular once  sodium chloride 0.9%. 1000 milliLiter(s) (75 mL/Hr) IV Continuous <Continuous>  vancomycin  IVPB 750 milliGRAM(s) IV Intermittent every 12 hours    MEDICATIONS  (PRN):  acetaminophen   IVPB .. 1000 milliGRAM(s) IV Intermittent every 6 hours PRN Severe Pain (7 - 10)  fentaNYL    Injectable 25 MICROGram(s) IV Push every 5 minutes PRN Moderate Pain (4 - 6)  HYDROmorphone  Injectable 0.25 milliGRAM(s) IV Push every 10 minutes PRN Severe Pain (7 - 10)  ondansetron Injectable 4 milliGRAM(s) IV Push every 6 hours PRN Nausea and/or Vomiting  oxyCODONE    IR 5 milliGRAM(s) Oral every 4 hours PRN Moderate Pain (4 - 6)  oxyCODONE    IR 10 milliGRAM(s) Oral every 4 hours PRN Severe Pain (7 - 10)      I&O's Summary    03 Jan 2024 07:01  -  04 Jan 2024 07:00  --------------------------------------------------------  IN: 980 mL / OUT: 200 mL / NET: 780 mL        PHYSICAL EXAM:  Vital Signs Last 24 Hrs  T(C): 36 (04 Jan 2024 15:50), Max: 36.7 (03 Jan 2024 17:10)  T(F): 96.8 (04 Jan 2024 15:50), Max: 98.1 (03 Jan 2024 17:10)  HR: 83 (04 Jan 2024 16:30) (69 - 103)  BP: 113/53 (04 Jan 2024 16:30) (108/58 - 135/78)  BP(mean): 77 (04 Jan 2024 16:30) (76 - 90)  RR: 16 (04 Jan 2024 16:30) (16 - 18)  SpO2: 93% (04 Jan 2024 16:30) (93% - 97%)    Parameters below as of 04 Jan 2024 15:50  Patient On (Oxygen Delivery Method): room air    GENERAL: NAD, well-developed  HEAD:  Atraumatic, Normocephalic  EYES:  conjunctiva and sclera clear  NECK: Supple, No JVD  CHEST/LUNG: Clear to auscultation bilaterally; No wheeze  HEART: Regular rate and rhythm; No murmurs, rubs, or gallops  ABDOMEN: Soft, Nontender, Nondistended; Bowel sounds present  EXTREMITIES:  2+ Peripheral Pulses, No clubbing, cyanosis, or edema  PSYCH: AAOx2-3  NEUROLOGY: non-focal    LABS:                        13.8   9.83  )-----------( 334      ( 03 Jan 2024 09:38 )             42.4     01-03    140  |  105  |  16  ----------------------------<  98  4.1   |  24  |  0.61    Ca    9.2      03 Jan 2024 09:38      PT/INR - ( 03 Jan 2024 17:32 )   PT: 12.6 sec;   INR: 1.15 ratio         PTT - ( 03 Jan 2024 17:32 )  PTT:31.6 sec      Urinalysis Basic - ( 03 Jan 2024 09:38 )    Color: x / Appearance: x / SG: x / pH: x  Gluc: 98 mg/dL / Ketone: x  / Bili: x / Urobili: x   Blood: x / Protein: x / Nitrite: x   Leuk Esterase: x / RBC: x / WBC x   Sq Epi: x / Non Sq Epi: x / Bacteria: x            RADIOLOGY & ADDITIONAL TESTS:  New Imaging Personally Reviewed Today:  New Electrocardiogram Personally Reviewed Today:  Other Results Reviewed Today:   Prior or Outpatient Records Reviewed Today with Summary:    COORDINATION OF CARE:  Consultant Communication and Details of Discussion (where applicable):     [facility] Division of Hospital Medicine  Anthony Castro MD  Available via MS Teams    SUBJECTIVE / OVERNIGHT EVENTS:  no acute events overnight    ADDITIONAL REVIEW OF SYSTEMS:    MEDICATIONS  (STANDING):  cefTRIAXone   IVPB 500 milliGRAM(s) IV Intermittent every 12 hours  influenza  Vaccine (HIGH DOSE) 0.7 milliLiter(s) IntraMuscular once  sodium chloride 0.9%. 1000 milliLiter(s) (75 mL/Hr) IV Continuous <Continuous>  vancomycin  IVPB 750 milliGRAM(s) IV Intermittent every 12 hours    MEDICATIONS  (PRN):  acetaminophen   IVPB .. 1000 milliGRAM(s) IV Intermittent every 6 hours PRN Severe Pain (7 - 10)  fentaNYL    Injectable 25 MICROGram(s) IV Push every 5 minutes PRN Moderate Pain (4 - 6)  HYDROmorphone  Injectable 0.25 milliGRAM(s) IV Push every 10 minutes PRN Severe Pain (7 - 10)  ondansetron Injectable 4 milliGRAM(s) IV Push every 6 hours PRN Nausea and/or Vomiting  oxyCODONE    IR 5 milliGRAM(s) Oral every 4 hours PRN Moderate Pain (4 - 6)  oxyCODONE    IR 10 milliGRAM(s) Oral every 4 hours PRN Severe Pain (7 - 10)      I&O's Summary    03 Jan 2024 07:01  -  04 Jan 2024 07:00  --------------------------------------------------------  IN: 980 mL / OUT: 200 mL / NET: 780 mL        PHYSICAL EXAM:  Vital Signs Last 24 Hrs  T(C): 36 (04 Jan 2024 15:50), Max: 36.7 (03 Jan 2024 17:10)  T(F): 96.8 (04 Jan 2024 15:50), Max: 98.1 (03 Jan 2024 17:10)  HR: 83 (04 Jan 2024 16:30) (69 - 103)  BP: 113/53 (04 Jan 2024 16:30) (108/58 - 135/78)  BP(mean): 77 (04 Jan 2024 16:30) (76 - 90)  RR: 16 (04 Jan 2024 16:30) (16 - 18)  SpO2: 93% (04 Jan 2024 16:30) (93% - 97%)    Parameters below as of 04 Jan 2024 15:50  Patient On (Oxygen Delivery Method): room air    GENERAL: NAD, well-developed  HEAD:  Atraumatic, Normocephalic  EYES:  conjunctiva and sclera clear  NECK: Supple, No JVD  CHEST/LUNG: Clear to auscultation bilaterally; No wheeze  HEART: Regular rate and rhythm; No murmurs, rubs, or gallops  ABDOMEN: Soft, Nontender, Nondistended; Bowel sounds present  EXTREMITIES:  2+ Peripheral Pulses, No clubbing, cyanosis, or edema  PSYCH: AAOx2-3  NEUROLOGY: non-focal    LABS:                        13.8   9.83  )-----------( 334      ( 03 Jan 2024 09:38 )             42.4     01-03    140  |  105  |  16  ----------------------------<  98  4.1   |  24  |  0.61    Ca    9.2      03 Jan 2024 09:38      PT/INR - ( 03 Jan 2024 17:32 )   PT: 12.6 sec;   INR: 1.15 ratio         PTT - ( 03 Jan 2024 17:32 )  PTT:31.6 sec      Urinalysis Basic - ( 03 Jan 2024 09:38 )    Color: x / Appearance: x / SG: x / pH: x  Gluc: 98 mg/dL / Ketone: x  / Bili: x / Urobili: x   Blood: x / Protein: x / Nitrite: x   Leuk Esterase: x / RBC: x / WBC x   Sq Epi: x / Non Sq Epi: x / Bacteria: x            RADIOLOGY & ADDITIONAL TESTS:  New Imaging Personally Reviewed Today:  New Electrocardiogram Personally Reviewed Today:  Other Results Reviewed Today:   Prior or Outpatient Records Reviewed Today with Summary:    COORDINATION OF CARE:  Consultant Communication and Details of Discussion (where applicable):     [facility] Division of Hospital Medicine  Anthony Castro MD  Available via MS Teams    SUBJECTIVE / OVERNIGHT EVENTS:  no acute events overnight    ADDITIONAL REVIEW OF SYSTEMS:    MEDICATIONS  (STANDING):  cefTRIAXone   IVPB 500 milliGRAM(s) IV Intermittent every 12 hours  influenza  Vaccine (HIGH DOSE) 0.7 milliLiter(s) IntraMuscular once  sodium chloride 0.9%. 1000 milliLiter(s) (75 mL/Hr) IV Continuous <Continuous>  vancomycin  IVPB 750 milliGRAM(s) IV Intermittent every 12 hours    MEDICATIONS  (PRN):  acetaminophen   IVPB .. 1000 milliGRAM(s) IV Intermittent every 6 hours PRN Severe Pain (7 - 10)  fentaNYL    Injectable 25 MICROGram(s) IV Push every 5 minutes PRN Moderate Pain (4 - 6)  HYDROmorphone  Injectable 0.25 milliGRAM(s) IV Push every 10 minutes PRN Severe Pain (7 - 10)  ondansetron Injectable 4 milliGRAM(s) IV Push every 6 hours PRN Nausea and/or Vomiting  oxyCODONE    IR 5 milliGRAM(s) Oral every 4 hours PRN Moderate Pain (4 - 6)  oxyCODONE    IR 10 milliGRAM(s) Oral every 4 hours PRN Severe Pain (7 - 10)      I&O's Summary    03 Jan 2024 07:01  -  04 Jan 2024 07:00  --------------------------------------------------------  IN: 980 mL / OUT: 200 mL / NET: 780 mL        PHYSICAL EXAM:  Vital Signs Last 24 Hrs  T(C): 36 (04 Jan 2024 15:50), Max: 36.7 (03 Jan 2024 17:10)  T(F): 96.8 (04 Jan 2024 15:50), Max: 98.1 (03 Jan 2024 17:10)  HR: 83 (04 Jan 2024 16:30) (69 - 103)  BP: 113/53 (04 Jan 2024 16:30) (108/58 - 135/78)  BP(mean): 77 (04 Jan 2024 16:30) (76 - 90)  RR: 16 (04 Jan 2024 16:30) (16 - 18)  SpO2: 93% (04 Jan 2024 16:30) (93% - 97%)    Parameters below as of 04 Jan 2024 15:50  Patient On (Oxygen Delivery Method): room air      LABS:                        13.8   9.83  )-----------( 334      ( 03 Jan 2024 09:38 )             42.4     01-03    140  |  105  |  16  ----------------------------<  98  4.1   |  24  |  0.61    Ca    9.2      03 Jan 2024 09:38      PT/INR - ( 03 Jan 2024 17:32 )   PT: 12.6 sec;   INR: 1.15 ratio         PTT - ( 03 Jan 2024 17:32 )  PTT:31.6 sec      Urinalysis Basic - ( 03 Jan 2024 09:38 )    Color: x / Appearance: x / SG: x / pH: x  Gluc: 98 mg/dL / Ketone: x  / Bili: x / Urobili: x   Blood: x / Protein: x / Nitrite: x   Leuk Esterase: x / RBC: x / WBC x   Sq Epi: x / Non Sq Epi: x / Bacteria: x            RADIOLOGY & ADDITIONAL TESTS:  New Imaging Personally Reviewed Today:  New Electrocardiogram Personally Reviewed Today:  Other Results Reviewed Today:   Prior or Outpatient Records Reviewed Today with Summary:    COORDINATION OF CARE:  Consultant Communication and Details of Discussion (where applicable):     [facility] Division of Hospital Medicine  Anthony Castro MD  Available via MS Teams    SUBJECTIVE / OVERNIGHT EVENTS:  no acute events overnight    ADDITIONAL REVIEW OF SYSTEMS:    MEDICATIONS  (STANDING):  cefTRIAXone   IVPB 500 milliGRAM(s) IV Intermittent every 12 hours  influenza  Vaccine (HIGH DOSE) 0.7 milliLiter(s) IntraMuscular once  sodium chloride 0.9%. 1000 milliLiter(s) (75 mL/Hr) IV Continuous <Continuous>  vancomycin  IVPB 750 milliGRAM(s) IV Intermittent every 12 hours    MEDICATIONS  (PRN):  acetaminophen   IVPB .. 1000 milliGRAM(s) IV Intermittent every 6 hours PRN Severe Pain (7 - 10)  fentaNYL    Injectable 25 MICROGram(s) IV Push every 5 minutes PRN Moderate Pain (4 - 6)  HYDROmorphone  Injectable 0.25 milliGRAM(s) IV Push every 10 minutes PRN Severe Pain (7 - 10)  ondansetron Injectable 4 milliGRAM(s) IV Push every 6 hours PRN Nausea and/or Vomiting  oxyCODONE    IR 5 milliGRAM(s) Oral every 4 hours PRN Moderate Pain (4 - 6)  oxyCODONE    IR 10 milliGRAM(s) Oral every 4 hours PRN Severe Pain (7 - 10)      I&O's Summary    03 Jan 2024 07:01  -  04 Jan 2024 07:00  --------------------------------------------------------  IN: 980 mL / OUT: 200 mL / NET: 780 mL        PHYSICAL EXAM:  Vital Signs Last 24 Hrs  T(C): 36 (04 Jan 2024 15:50), Max: 36.7 (03 Jan 2024 17:10)  T(F): 96.8 (04 Jan 2024 15:50), Max: 98.1 (03 Jan 2024 17:10)  HR: 83 (04 Jan 2024 16:30) (69 - 103)  BP: 113/53 (04 Jan 2024 16:30) (108/58 - 135/78)  BP(mean): 77 (04 Jan 2024 16:30) (76 - 90)  RR: 16 (04 Jan 2024 16:30) (16 - 18)  SpO2: 93% (04 Jan 2024 16:30) (93% - 97%)    Parameters below as of 04 Jan 2024 15:50  Patient On (Oxygen Delivery Method): room air      LABS:                        13.8   9.83  )-----------( 334      ( 03 Jan 2024 09:38 )             42.4     01-03    140  |  105  |  16  ----------------------------<  98  4.1   |  24  |  0.61    Ca    9.2      03 Jan 2024 09:38      PT/INR - ( 03 Jan 2024 17:32 )   PT: 12.6 sec;   INR: 1.15 ratio         PTT - ( 03 Jan 2024 17:32 )  PTT:31.6 sec      Urinalysis Basic - ( 03 Jan 2024 09:38 )    Color: x / Appearance: x / SG: x / pH: x  Gluc: 98 mg/dL / Ketone: x  / Bili: x / Urobili: x   Blood: x / Protein: x / Nitrite: x   Leuk Esterase: x / RBC: x / WBC x   Sq Epi: x / Non Sq Epi: x / Bacteria: x            RADIOLOGY & ADDITIONAL TESTS:  New Imaging Personally Reviewed Today:  New Electrocardiogram Personally Reviewed Today:  Other Results Reviewed Today:   Prior or Outpatient Records Reviewed Today with Summary:    COORDINATION OF CARE:  Consultant Communication and Details of Discussion (where applicable):

## 2024-01-04 NOTE — BRIEF OPERATIVE NOTE - OPERATION/FINDINGS
s/p  shunt (left sided 2/2 left ventriculomegaly)  -CERTAS @ 6.  -PACU 4-6h then back to [address] under medicine  -we will follow as a consult for incision checks/pending imaging  -Vanc/CTX q12h through tmrw PM  -CTH tonight before 12am  -XR shunt series  -incisions closed with absorbable sutures, should stay covered for at least 3 days

## 2024-01-04 NOTE — PROGRESS NOTE ADULT - ASSESSMENT
78F h/o known L parasellar mening dx 2015, following w/ Dr. Jackson, s/p SRS w/ Dr. Coates 8/2023, p/f gen weakness, fatigue, FTT, difficulty walking, mild memory loss, +UTI. 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 Outpatient MRI @R 10/31/23 confirms mening extending into cav sinus, encasing LICA, extending medially to L temp lobe. Stable from MRI 4/2023 and 5/2017. However, L temporal horn appears trapped, increased in size on MRI 10/2023 from 4/2023. Exam: Gen weakness, wide awake, waxes/wanes, Ox2-3, no drift, L pupil 5R, R 4R, limited L eye upward and medial gaze, no facial, MORAN strongly, SILT  S/p  shunt (left sided 2/2 left ventriculomegaly)  -CERTAS@6  -PACU 4-6h then back to [address] [place] [address] under medicine  -we will follow as a consult thereafter for incision checks/pending imaging  -Vanc/CTX q12h through tmrw PM  -CTH in PACU  -XR shunt series  -o/w care per primary 78F h/o known L parasellar mening dx 2015, following w/ Dr. Jackson, s/p SRS w/ Dr. Coates 8/2023, p/f gen weakness, fatigue, FTT, difficulty walking, mild memory loss, +UTI. 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 Outpatient MRI @R 10/31/23 confirms mening extending into cav sinus, encasing LICA, extending medially to L temp lobe. Stable from MRI 4/2023 and 5/2017. However, L temporal horn appears trapped, increased in size on MRI 10/2023 from 4/2023. Exam: Gen weakness, wide awake, waxes/wanes, Ox2-3, no drift, L pupil 5R, R 4R, limited L eye upward and medial gaze, no facial, MORAN strongly, SILT  S/p  shunt (left sided 2/2 left ventriculomegaly)  -CERTAS@6  -PACU 4-6h then back to [address] [place] [address] under medicine  -we will follow as a consult thereafter for incision checks/pending imaging  -Vanc/CTX q12h through tmrw PM  -CTH in PACU  -XR shunt series  -o/w care per primary

## 2024-01-04 NOTE — PRE-ANESTHESIA EVALUATION ADULT - NSANTHPMHFT_GEN_ALL_CORE
Medical history and ROS reviewed  h/o fall in November c/b fractured ribs and found to have meningioma  Breathing comfortably on RA, no longer having rib pain  Neurological symptoms primarily bilateral LE weakness and memory loss  No known cardiac history, no CP, no CHF symptoms

## 2024-01-05 LAB
ANION GAP SERPL CALC-SCNC: 8 MMOL/L — SIGNIFICANT CHANGE UP (ref 5–17)
ANION GAP SERPL CALC-SCNC: 8 MMOL/L — SIGNIFICANT CHANGE UP (ref 5–17)
BUN SERPL-MCNC: 6 MG/DL — LOW (ref 7–23)
BUN SERPL-MCNC: 6 MG/DL — LOW (ref 7–23)
CALCIUM SERPL-MCNC: 8.8 MG/DL — SIGNIFICANT CHANGE UP (ref 8.4–10.5)
CALCIUM SERPL-MCNC: 8.8 MG/DL — SIGNIFICANT CHANGE UP (ref 8.4–10.5)
CHLORIDE SERPL-SCNC: 107 MMOL/L — SIGNIFICANT CHANGE UP (ref 96–108)
CHLORIDE SERPL-SCNC: 107 MMOL/L — SIGNIFICANT CHANGE UP (ref 96–108)
CO2 SERPL-SCNC: 26 MMOL/L — SIGNIFICANT CHANGE UP (ref 22–31)
CO2 SERPL-SCNC: 26 MMOL/L — SIGNIFICANT CHANGE UP (ref 22–31)
CREAT SERPL-MCNC: 0.55 MG/DL — SIGNIFICANT CHANGE UP (ref 0.5–1.3)
CREAT SERPL-MCNC: 0.55 MG/DL — SIGNIFICANT CHANGE UP (ref 0.5–1.3)
EGFR: 95 ML/MIN/1.73M2 — SIGNIFICANT CHANGE UP
EGFR: 95 ML/MIN/1.73M2 — SIGNIFICANT CHANGE UP
GLUCOSE SERPL-MCNC: 97 MG/DL — SIGNIFICANT CHANGE UP (ref 70–99)
GLUCOSE SERPL-MCNC: 97 MG/DL — SIGNIFICANT CHANGE UP (ref 70–99)
HCT VFR BLD CALC: 38.3 % — SIGNIFICANT CHANGE UP (ref 34.5–45)
HCT VFR BLD CALC: 38.3 % — SIGNIFICANT CHANGE UP (ref 34.5–45)
HGB BLD-MCNC: 12.5 G/DL — SIGNIFICANT CHANGE UP (ref 11.5–15.5)
HGB BLD-MCNC: 12.5 G/DL — SIGNIFICANT CHANGE UP (ref 11.5–15.5)
MCHC RBC-ENTMCNC: 26.5 PG — LOW (ref 27–34)
MCHC RBC-ENTMCNC: 26.5 PG — LOW (ref 27–34)
MCHC RBC-ENTMCNC: 32.6 GM/DL — SIGNIFICANT CHANGE UP (ref 32–36)
MCHC RBC-ENTMCNC: 32.6 GM/DL — SIGNIFICANT CHANGE UP (ref 32–36)
MCV RBC AUTO: 81.3 FL — SIGNIFICANT CHANGE UP (ref 80–100)
MCV RBC AUTO: 81.3 FL — SIGNIFICANT CHANGE UP (ref 80–100)
NRBC # BLD: 0 /100 WBCS — SIGNIFICANT CHANGE UP (ref 0–0)
NRBC # BLD: 0 /100 WBCS — SIGNIFICANT CHANGE UP (ref 0–0)
PLATELET # BLD AUTO: 322 K/UL — SIGNIFICANT CHANGE UP (ref 150–400)
PLATELET # BLD AUTO: 322 K/UL — SIGNIFICANT CHANGE UP (ref 150–400)
POTASSIUM SERPL-MCNC: 3.6 MMOL/L — SIGNIFICANT CHANGE UP (ref 3.5–5.3)
POTASSIUM SERPL-MCNC: 3.6 MMOL/L — SIGNIFICANT CHANGE UP (ref 3.5–5.3)
POTASSIUM SERPL-SCNC: 3.6 MMOL/L — SIGNIFICANT CHANGE UP (ref 3.5–5.3)
POTASSIUM SERPL-SCNC: 3.6 MMOL/L — SIGNIFICANT CHANGE UP (ref 3.5–5.3)
RBC # BLD: 4.71 M/UL — SIGNIFICANT CHANGE UP (ref 3.8–5.2)
RBC # BLD: 4.71 M/UL — SIGNIFICANT CHANGE UP (ref 3.8–5.2)
RBC # FLD: 14.6 % — HIGH (ref 10.3–14.5)
RBC # FLD: 14.6 % — HIGH (ref 10.3–14.5)
SODIUM SERPL-SCNC: 141 MMOL/L — SIGNIFICANT CHANGE UP (ref 135–145)
SODIUM SERPL-SCNC: 141 MMOL/L — SIGNIFICANT CHANGE UP (ref 135–145)
WBC # BLD: 14.19 K/UL — HIGH (ref 3.8–10.5)
WBC # BLD: 14.19 K/UL — HIGH (ref 3.8–10.5)
WBC # FLD AUTO: 14.19 K/UL — HIGH (ref 3.8–10.5)
WBC # FLD AUTO: 14.19 K/UL — HIGH (ref 3.8–10.5)

## 2024-01-05 PROCEDURE — 70250 X-RAY EXAM OF SKULL: CPT | Mod: 26

## 2024-01-05 PROCEDURE — 99232 SBSQ HOSP IP/OBS MODERATE 35: CPT

## 2024-01-05 PROCEDURE — 99024 POSTOP FOLLOW-UP VISIT: CPT

## 2024-01-05 PROCEDURE — 71045 X-RAY EXAM CHEST 1 VIEW: CPT | Mod: 26

## 2024-01-05 PROCEDURE — 70450 CT HEAD/BRAIN W/O DYE: CPT | Mod: 26

## 2024-01-05 PROCEDURE — 74018 RADEX ABDOMEN 1 VIEW: CPT | Mod: 26

## 2024-01-05 RX ADMIN — Medication 250 MILLIGRAM(S): at 16:15

## 2024-01-05 RX ADMIN — SODIUM CHLORIDE 75 MILLILITER(S): 9 INJECTION INTRAMUSCULAR; INTRAVENOUS; SUBCUTANEOUS at 16:15

## 2024-01-05 RX ADMIN — SODIUM CHLORIDE 75 MILLILITER(S): 9 INJECTION INTRAMUSCULAR; INTRAVENOUS; SUBCUTANEOUS at 04:15

## 2024-01-05 RX ADMIN — Medication 250 MILLIGRAM(S): at 04:15

## 2024-01-05 RX ADMIN — CEFTRIAXONE 100 MILLIGRAM(S): 500 INJECTION, POWDER, FOR SOLUTION INTRAMUSCULAR; INTRAVENOUS at 12:48

## 2024-01-05 NOTE — PROGRESS NOTE ADULT - SUBJECTIVE AND OBJECTIVE BOX
Patient seen and examined at bedside.    --Anticoagulation--    T(C): 37 (01-05-24 @ 13:10), Max: 37.3 (01-05-24 @ 01:03)  HR: 82 (01-05-24 @ 13:10) (82 - 98)  BP: 122/60 (01-05-24 @ 13:10) (113/67 - 146/68)  RR: 18 (01-05-24 @ 13:10) (15 - 18)  SpO2: 95% (01-05-24 @ 13:10) (93% - 100%)  Wt(kg): --    Exam: Ox2-3, no drift, MORAN strongly, SILT

## 2024-01-05 NOTE — PROGRESS NOTE ADULT - SUBJECTIVE AND OBJECTIVE BOX
afeb  soft / NT / ND  clean Steri-strips over LUQ incisions  old low-midline laparotomy scar    WBC = 14

## 2024-01-05 NOTE — PROGRESS NOTE ADULT - ASSESSMENT
78F h/o known L parasellar mening dx 2015, following w/ Dr. Jackson, s/p SRS w/ Dr. Coates 8/2023, p/f gen weakness, fatigue, FTT, difficulty walking, mild memory loss, +UTI. 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 Outpatient MRI @R 10/31/23 confirms mening extending into cav sinus, encasing LICA, extending medially to L temp lobe. Stable from MRI 4/2023 and 5/2017. However, L temporal horn appears trapped, increased in size on MRI 10/2023 from 4/2023. Exam: Gen weakness, wide awake, waxes/wanes, Ox2-3, no drift, L pupil 5R, R 4R, limited L eye upward and medial gaze, no facial, MORAN strongly, SILT  S/p  shunt (left sided 2/2 left ventriculomegaly)  -CERTAS@6  -Vanc/CTX q12h this evening  -XR shunt series pending

## 2024-01-05 NOTE — PROGRESS NOTE ADULT - ASSESSMENT
78F, presenting for adult FTT after fall and rib fx few weeks ago.  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  s/p VPS shunt on 01/4

## 2024-01-05 NOTE — PROGRESS NOTE ADULT - PROBLEM SELECTOR PLAN 1
-known meningioma. CT head w/o vasogenic edema.   - neurosurgery team has seen pt who recommended MRI of brain which revealed large left cavernous mass with plan for VPS placement on 1/4/24\  - s/p VPS shunting on 01/5   -post procedure patient has remained confused AXO 1-2, spoke to NSG and they want to repeat Head CT , will follow up with the report   -  OPHTHALMOLOGY was consulted with no acute findings  -advance diet as tolerated, waiting for SLP recs

## 2024-01-05 NOTE — CHART NOTE - NSCHARTNOTEFT_GEN_A_CORE
Medicine PA Episodic Note    Notified by RN that patient is confused, pulling at steri-strips and surgical gauze. Pt. was seen and examined at bedside. Patient is A&OX 0&1. Patient is confused and not following commands. On mittens restraints. patient is s/p  shunt.        VITAL SIGNS:  T(C): 37.2 (01-05-24 @ 04:55), Max: 37.3 (01-05-24 @ 01:03)  HR: 97 (01-05-24 @ 04:55) (69 - 103)  BP: 131/79 (01-05-24 @ 04:55) (108/58 - 146/68)  RR: 18 (01-05-24 @ 04:55) (15 - 18)  SpO2: 98% (01-05-24 @ 04:55) (93% - 100%)  Wt(kg): --      LABORATORY:                          13.8   9.83  )-----------( 334      ( 03 Jan 2024 09:38 )             42.4       01-03    140  |  105  |  16  ----------------------------<  98  4.1   |  24  |  0.61    Ca    9.2      03 Jan 2024 09:38      PHYSICAL EXAM:    Constitutional: AOx0 to 1. NAD.    Respiratory: clear lungs bilaterally. No wheezing, rhonchi, or crackles.    Cardiovascular: S1 S2. No murmurs.    Gastrointestinal: BS X4 active. soft. nontender.    Extremities/Vascular: +2 pulses bilaterally. No BLE edema.      ASSESSMENT/PLAN:   HPI:  78F PMhx meningioma s/p radiation, asthma. Pt fell 11/15 and broke ribs, being tx'd conservatively. Since the fall, pt has declined in function and per son, also having some memory loss 1-2weeks. Of note, pt was rx'd a short-course of oxycodone for pain but did not take it; instead, she's been taking tylenol and motrin.   She still has some pain.   Denied fever, chills, cp, sob, cough, palpitation, abd pain, n/v/d, or significant change in bowel/urinary habits.    In ED, VSS.   CBC and CMP unremarkable. UA was cloudy, with large LE and mod bacteria.   Ordered for CTX in the ED.  (27 Dec 2023 01:43)        Plan  -Spoke with radiology with no change of the CT head s/p  shunt  -Neurosurgery was contacted and will follow up  -Will repeat CTH   -Will monitor  - F/U primary team in AM    Follow up with Attending in AM    Eric Shamra PA-C   Department of Medicine Medicine PA Episodic Note    Notified by RN that patient is confused, pulling at steri-strips and surgical gauze. Pt. was seen and examined at bedside. Patient is A&OX 0&1. Patient is confused and not following commands. On mittens restraints. patient is s/p  shunt.        VITAL SIGNS:  T(C): 37.2 (01-05-24 @ 04:55), Max: 37.3 (01-05-24 @ 01:03)  HR: 97 (01-05-24 @ 04:55) (69 - 103)  BP: 131/79 (01-05-24 @ 04:55) (108/58 - 146/68)  RR: 18 (01-05-24 @ 04:55) (15 - 18)  SpO2: 98% (01-05-24 @ 04:55) (93% - 100%)  Wt(kg): --      LABORATORY:                          13.8   9.83  )-----------( 334      ( 03 Jan 2024 09:38 )             42.4       01-03    140  |  105  |  16  ----------------------------<  98  4.1   |  24  |  0.61    Ca    9.2      03 Jan 2024 09:38      PHYSICAL EXAM:    Constitutional: AOx0 to 1. NAD.    Respiratory: clear lungs bilaterally. No wheezing, rhonchi, or crackles.    Cardiovascular: S1 S2. No murmurs.    Gastrointestinal: BS X4 active. soft. nontender.    Extremities/Vascular: +2 pulses bilaterally. No BLE edema.      ASSESSMENT/PLAN:   HPI:  78F PMhx meningioma s/p radiation, asthma. Pt fell 11/15 and broke ribs, being tx'd conservatively. Since the fall, pt has declined in function and per son, also having some memory loss 1-2weeks. Of note, pt was rx'd a short-course of oxycodone for pain but did not take it; instead, she's been taking tylenol and motrin.   She still has some pain.   Denied fever, chills, cp, sob, cough, palpitation, abd pain, n/v/d, or significant change in bowel/urinary habits.    In ED, VSS.   CBC and CMP unremarkable. UA was cloudy, with large LE and mod bacteria.   Ordered for CTX in the ED.  (27 Dec 2023 01:43)        Plan  -Spoke with radiology with no change of the CT head s/p  shunt  -Neurosurgery was contacted and will follow up  -Will repeat CTH   -Will monitor  - F/U primary team in AM    Follow up with Attending in AM    Eric Sharma PA-C   Department of Medicine

## 2024-01-05 NOTE — PROGRESS NOTE ADULT - PROBLEM SELECTOR PLAN 6
VTE ppx: scd

## 2024-01-05 NOTE — PROGRESS NOTE ADULT - SUBJECTIVE AND OBJECTIVE BOX
[facility] Division of Hospital Medicine  Anthony Castro MD  Available via MS Teams    SUBJECTIVE / OVERNIGHT EVENTS:  seen and examined at bedside this am, patient was axo 1-2 this am, but was not agitated, was calm and could recognize some of the family members ros was limited due to mentation     ADDITIONAL REVIEW OF SYSTEMS:    MEDICATIONS  (STANDING):  cefTRIAXone   IVPB 1000 milliGRAM(s) IV Intermittent every 24 hours  influenza  Vaccine (HIGH DOSE) 0.7 milliLiter(s) IntraMuscular once  sodium chloride 0.9%. 1000 milliLiter(s) (75 mL/Hr) IV Continuous <Continuous>  vancomycin  IVPB 750 milliGRAM(s) IV Intermittent every 12 hours    MEDICATIONS  (PRN):  acetaminophen   IVPB .. 1000 milliGRAM(s) IV Intermittent every 6 hours PRN Severe Pain (7 - 10)  oxyCODONE    IR 5 milliGRAM(s) Oral every 4 hours PRN Moderate Pain (4 - 6)  oxyCODONE    IR 10 milliGRAM(s) Oral every 4 hours PRN Severe Pain (7 - 10)      I&O's Summary    04 Jan 2024 07:01  -  05 Jan 2024 07:00  --------------------------------------------------------  IN: 1120 mL / OUT: 700 mL / NET: 420 mL    05 Jan 2024 07:01  -  05 Jan 2024 18:19  --------------------------------------------------------  IN: 300 mL / OUT: 600 mL / NET: -300 mL        PHYSICAL EXAM:  Vital Signs Last 24 Hrs  T(C): 37 (05 Jan 2024 13:10), Max: 37.3 (05 Jan 2024 01:03)  T(F): 98.6 (05 Jan 2024 13:10), Max: 99.1 (05 Jan 2024 01:03)  HR: 82 (05 Jan 2024 13:10) (82 - 98)  BP: 122/60 (05 Jan 2024 13:10) (113/67 - 146/68)  BP(mean): 96 (04 Jan 2024 22:00) (86 - 98)  RR: 18 (05 Jan 2024 13:10) (15 - 18)  SpO2: 95% (05 Jan 2024 13:10) (95% - 100%)    Parameters below as of 05 Jan 2024 13:10  Patient On (Oxygen Delivery Method): room air      GENERAL: NAD, well-developed, Axo 1-2   HEAD:  Atraumatic, Normocephalic, with bandage at the back of head post surgery   CHEST/LUNG: Clear to auscultation bilaterally; No wheeze  HEART: Regular rate and rhythm; No murmurs, rubs, or gallops  ABDOMEN: Soft, Nontender, Nondistended; Bowel sounds present  EXTREMITIES:  2+ Peripheral Pulses, No clubbing, cyanosis, or edema  PSYCH: AAOx 1-2 post surgery   NEUROLOGY: non-focal    LABS:                        12.5   14.19 )-----------( 322      ( 05 Jan 2024 10:28 )             38.3     01-05    141  |  107  |  6<L>  ----------------------------<  97  3.6   |  26  |  0.55    Ca    8.8      05 Jan 2024 10:28            Urinalysis Basic - ( 05 Jan 2024 10:28 )    Color: x / Appearance: x / SG: x / pH: x  Gluc: 97 mg/dL / Ketone: x  / Bili: x / Urobili: x   Blood: x / Protein: x / Nitrite: x   Leuk Esterase: x / RBC: x / WBC x   Sq Epi: x / Non Sq Epi: x / Bacteria: x            RADIOLOGY & ADDITIONAL TESTS:  New Imaging Personally Reviewed Today:  New Electrocardiogram Personally Reviewed Today:  Other Results Reviewed Today:   Prior or Outpatient Records Reviewed Today with Summary:    COORDINATION OF CARE:  Consultant Communication and Details of Discussion (where applicable):     [facility] Division of Hospital Medicine  Anthony Castro MD  Available via MS Teams    SUBJECTIVE / OVERNIGHT EVENTS:  seen and examined at bedside this am, patient was axo 1-2 this am, but was not agitated, was calm and could recognize some of the family members ros was limited due to mentation     ADDITIONAL REVIEW OF SYSTEMS:    MEDICATIONS  (STANDING):  cefTRIAXone   IVPB 1000 milliGRAM(s) IV Intermittent every 24 hours  influenza  Vaccine (HIGH DOSE) 0.7 milliLiter(s) IntraMuscular once  sodium chloride 0.9%. 1000 milliLiter(s) (75 mL/Hr) IV Continuous <Continuous>  vancomycin  IVPB 750 milliGRAM(s) IV Intermittent every 12 hours    MEDICATIONS  (PRN):  acetaminophen   IVPB .. 1000 milliGRAM(s) IV Intermittent every 6 hours PRN Severe Pain (7 - 10)  oxyCODONE    IR 5 milliGRAM(s) Oral every 4 hours PRN Moderate Pain (4 - 6)  oxyCODONE    IR 10 milliGRAM(s) Oral every 4 hours PRN Severe Pain (7 - 10)      I&O's Summary    04 Jan 2024 07:01  -  05 Jan 2024 07:00  --------------------------------------------------------  IN: 1120 mL / OUT: 700 mL / NET: 420 mL    05 Jan 2024 07:01  -  05 Jan 2024 18:19  --------------------------------------------------------  IN: 300 mL / OUT: 600 mL / NET: -300 mL        PHYSICAL EXAM:  Vital Signs Last 24 Hrs  T(C): 37 (05 Jan 2024 13:10), Max: 37.3 (05 Jan 2024 01:03)  T(F): 98.6 (05 Jan 2024 13:10), Max: 99.1 (05 Jan 2024 01:03)  HR: 82 (05 Jan 2024 13:10) (82 - 98)  BP: 122/60 (05 Jan 2024 13:10) (113/67 - 146/68)  BP(mean): 96 (04 Jan 2024 22:00) (86 - 98)  RR: 18 (05 Jan 2024 13:10) (15 - 18)  SpO2: 95% (05 Jan 2024 13:10) (95% - 100%)    Parameters below as of 05 Jan 2024 13:10  Patient On (Oxygen Delivery Method): room air      GENERAL: NAD, well-developed, Axo 1-2   HEAD:  Atraumatic, Normocephalic, with bandage at the back of head post surgery   CHEST/LUNG: Clear to auscultation bilaterally; No wheeze  HEART: Regular rate and rhythm; No murmurs, rubs, or gallops  ABDOMEN: Soft, Nontender, Nondistended; Bowel sounds present  EXTREMITIES:  2+ Peripheral Pulses, No clubbing, cyanosis, or edema  PSYCH: AAOx 1-2 post surgery   NEUROLOGY: non-focal    LABS:                        12.5   14.19 )-----------( 322      ( 05 Jan 2024 10:28 )             38.3     01-05    141  |  107  |  6<L>  ----------------------------<  97  3.6   |  26  |  0.55    Ca    8.8      05 Jan 2024 10:28            Urinalysis Basic - ( 05 Jan 2024 10:28 )    Color: x / Appearance: x / SG: x / pH: x  Gluc: 97 mg/dL / Ketone: x  / Bili: x / Urobili: x   Blood: x / Protein: x / Nitrite: x   Leuk Esterase: x / RBC: x / WBC x   Sq Epi: x / Non Sq Epi: x / Bacteria: x            RADIOLOGY & ADDITIONAL TESTS:  New Imaging Personally Reviewed Today:  New Electrocardiogram Personally Reviewed Today:  Other Results Reviewed Today:   Prior or Outpatient Records Reviewed Today with Summary:    COORDINATION OF CARE:  Consultant Communication and Details of Discussion (where applicable):

## 2024-01-05 NOTE — PROGRESS NOTE ADULT - ASSESSMENT
1/4/2023 - laparoscopic ventriculoperitoneal shunt placement  -no evidence of intraabdominal complications  -advance diet as tolerated  -reconsult acute care surgery PRN

## 2024-01-06 LAB
ANION GAP SERPL CALC-SCNC: 13 MMOL/L — SIGNIFICANT CHANGE UP (ref 5–17)
ANION GAP SERPL CALC-SCNC: 13 MMOL/L — SIGNIFICANT CHANGE UP (ref 5–17)
BUN SERPL-MCNC: 6 MG/DL — LOW (ref 7–23)
BUN SERPL-MCNC: 6 MG/DL — LOW (ref 7–23)
CALCIUM SERPL-MCNC: 8.9 MG/DL — SIGNIFICANT CHANGE UP (ref 8.4–10.5)
CALCIUM SERPL-MCNC: 8.9 MG/DL — SIGNIFICANT CHANGE UP (ref 8.4–10.5)
CHLORIDE SERPL-SCNC: 105 MMOL/L — SIGNIFICANT CHANGE UP (ref 96–108)
CHLORIDE SERPL-SCNC: 105 MMOL/L — SIGNIFICANT CHANGE UP (ref 96–108)
CO2 SERPL-SCNC: 22 MMOL/L — SIGNIFICANT CHANGE UP (ref 22–31)
CO2 SERPL-SCNC: 22 MMOL/L — SIGNIFICANT CHANGE UP (ref 22–31)
CREAT SERPL-MCNC: 0.48 MG/DL — LOW (ref 0.5–1.3)
CREAT SERPL-MCNC: 0.48 MG/DL — LOW (ref 0.5–1.3)
EGFR: 98 ML/MIN/1.73M2 — SIGNIFICANT CHANGE UP
EGFR: 98 ML/MIN/1.73M2 — SIGNIFICANT CHANGE UP
GLUCOSE SERPL-MCNC: 101 MG/DL — HIGH (ref 70–99)
GLUCOSE SERPL-MCNC: 101 MG/DL — HIGH (ref 70–99)
HCT VFR BLD CALC: 36.1 % — SIGNIFICANT CHANGE UP (ref 34.5–45)
HCT VFR BLD CALC: 36.1 % — SIGNIFICANT CHANGE UP (ref 34.5–45)
HGB BLD-MCNC: 11.6 G/DL — SIGNIFICANT CHANGE UP (ref 11.5–15.5)
HGB BLD-MCNC: 11.6 G/DL — SIGNIFICANT CHANGE UP (ref 11.5–15.5)
MCHC RBC-ENTMCNC: 26 PG — LOW (ref 27–34)
MCHC RBC-ENTMCNC: 26 PG — LOW (ref 27–34)
MCHC RBC-ENTMCNC: 32.1 GM/DL — SIGNIFICANT CHANGE UP (ref 32–36)
MCHC RBC-ENTMCNC: 32.1 GM/DL — SIGNIFICANT CHANGE UP (ref 32–36)
MCV RBC AUTO: 80.9 FL — SIGNIFICANT CHANGE UP (ref 80–100)
MCV RBC AUTO: 80.9 FL — SIGNIFICANT CHANGE UP (ref 80–100)
NRBC # BLD: 0 /100 WBCS — SIGNIFICANT CHANGE UP (ref 0–0)
NRBC # BLD: 0 /100 WBCS — SIGNIFICANT CHANGE UP (ref 0–0)
PLATELET # BLD AUTO: 314 K/UL — SIGNIFICANT CHANGE UP (ref 150–400)
PLATELET # BLD AUTO: 314 K/UL — SIGNIFICANT CHANGE UP (ref 150–400)
POTASSIUM SERPL-MCNC: 3.2 MMOL/L — LOW (ref 3.5–5.3)
POTASSIUM SERPL-MCNC: 3.2 MMOL/L — LOW (ref 3.5–5.3)
POTASSIUM SERPL-SCNC: 3.2 MMOL/L — LOW (ref 3.5–5.3)
POTASSIUM SERPL-SCNC: 3.2 MMOL/L — LOW (ref 3.5–5.3)
RBC # BLD: 4.46 M/UL — SIGNIFICANT CHANGE UP (ref 3.8–5.2)
RBC # BLD: 4.46 M/UL — SIGNIFICANT CHANGE UP (ref 3.8–5.2)
RBC # FLD: 14.6 % — HIGH (ref 10.3–14.5)
RBC # FLD: 14.6 % — HIGH (ref 10.3–14.5)
SODIUM SERPL-SCNC: 140 MMOL/L — SIGNIFICANT CHANGE UP (ref 135–145)
SODIUM SERPL-SCNC: 140 MMOL/L — SIGNIFICANT CHANGE UP (ref 135–145)
VANCOMYCIN TROUGH SERPL-MCNC: 8.9 UG/ML — LOW (ref 10–20)
VANCOMYCIN TROUGH SERPL-MCNC: 8.9 UG/ML — LOW (ref 10–20)
WBC # BLD: 10.04 K/UL — SIGNIFICANT CHANGE UP (ref 3.8–10.5)
WBC # BLD: 10.04 K/UL — SIGNIFICANT CHANGE UP (ref 3.8–10.5)
WBC # FLD AUTO: 10.04 K/UL — SIGNIFICANT CHANGE UP (ref 3.8–10.5)
WBC # FLD AUTO: 10.04 K/UL — SIGNIFICANT CHANGE UP (ref 3.8–10.5)

## 2024-01-06 PROCEDURE — 99232 SBSQ HOSP IP/OBS MODERATE 35: CPT

## 2024-01-06 RX ORDER — POTASSIUM CHLORIDE 20 MEQ
20 PACKET (EA) ORAL
Refills: 0 | Status: COMPLETED | OUTPATIENT
Start: 2024-01-06 | End: 2024-01-06

## 2024-01-06 RX ORDER — SODIUM CHLORIDE 9 MG/ML
1000 INJECTION INTRAMUSCULAR; INTRAVENOUS; SUBCUTANEOUS
Refills: 0 | Status: DISCONTINUED | OUTPATIENT
Start: 2024-01-06 | End: 2024-01-06

## 2024-01-06 RX ADMIN — Medication 250 MILLIGRAM(S): at 04:05

## 2024-01-06 RX ADMIN — CEFTRIAXONE 100 MILLIGRAM(S): 500 INJECTION, POWDER, FOR SOLUTION INTRAMUSCULAR; INTRAVENOUS at 13:08

## 2024-01-06 RX ADMIN — Medication 20 MILLIEQUIVALENT(S): at 13:07

## 2024-01-06 RX ADMIN — OXYCODONE HYDROCHLORIDE 10 MILLIGRAM(S): 5 TABLET ORAL at 15:32

## 2024-01-06 RX ADMIN — OXYCODONE HYDROCHLORIDE 10 MILLIGRAM(S): 5 TABLET ORAL at 15:59

## 2024-01-06 RX ADMIN — SODIUM CHLORIDE 40 MILLILITER(S): 9 INJECTION INTRAMUSCULAR; INTRAVENOUS; SUBCUTANEOUS at 13:09

## 2024-01-06 RX ADMIN — Medication 20 MILLIEQUIVALENT(S): at 15:07

## 2024-01-06 NOTE — PROGRESS NOTE ADULT - SUBJECTIVE AND OBJECTIVE BOX
[facility] Division of Hospital Medicine  Anthony Castro MD  Available via MS Teams    SUBJECTIVE / OVERNIGHT EVENTS:  seen at bedside this am, no acute events overnight, denies any complaints although AX O 1-2 mentation is improved as compared to yesterday, no signs of infection so far     ADDITIONAL REVIEW OF SYSTEMS:    MEDICATIONS  (STANDING):  cefTRIAXone   IVPB 1000 milliGRAM(s) IV Intermittent every 24 hours  influenza  Vaccine (HIGH DOSE) 0.7 milliLiter(s) IntraMuscular once  sodium chloride 0.9%. 1000 milliLiter(s) (40 mL/Hr) IV Continuous <Continuous>    MEDICATIONS  (PRN):  acetaminophen   IVPB .. 1000 milliGRAM(s) IV Intermittent every 6 hours PRN Severe Pain (7 - 10)  oxyCODONE    IR 5 milliGRAM(s) Oral every 4 hours PRN Moderate Pain (4 - 6)  oxyCODONE    IR 10 milliGRAM(s) Oral every 4 hours PRN Severe Pain (7 - 10)      I&O's Summary    05 Jan 2024 07:01  -  06 Jan 2024 07:00  --------------------------------------------------------  IN: 1709 mL / OUT: 1900 mL / NET: -191 mL    06 Jan 2024 07:01  -  06 Jan 2024 16:51  --------------------------------------------------------  IN: 460 mL / OUT: 0 mL / NET: 460 mL        PHYSICAL EXAM:  Vital Signs Last 24 Hrs  T(C): 37.3 (06 Jan 2024 12:30), Max: 37.3 (06 Jan 2024 12:30)  T(F): 99.1 (06 Jan 2024 12:30), Max: 99.1 (06 Jan 2024 12:30)  HR: 71 (06 Jan 2024 12:30) (71 - 96)  BP: 130/63 (06 Jan 2024 12:30) (121/80 - 145/60)  BP(mean): --  RR: 18 (06 Jan 2024 12:30) (18 - 18)  SpO2: 98% (06 Jan 2024 12:30) (96% - 99%)    Parameters below as of 06 Jan 2024 12:30  Patient On (Oxygen Delivery Method): room air      GENERAL: NAD, well-developed, Axo 1-2   HEAD:  Atraumatic, Normocephalic, with bandage at the back of head post surgery   CHEST/LUNG: Clear to auscultation bilaterally; No wheeze  HEART: Regular rate and rhythm; No murmurs, rubs, or gallops  ABDOMEN: Soft, Nontender, Nondistended; Bowel sounds present  EXTREMITIES:  2+ Peripheral Pulses, No clubbing, cyanosis, or edema  PSYCH: AAOx 1-2 post surgery   NEUROLOGY: non-focal  LABS:                        11.6   10.04 )-----------( 314      ( 06 Jan 2024 07:18 )             36.1     01-06    140  |  105  |  6<L>  ----------------------------<  101<H>  3.2<L>   |  22  |  0.48<L>    Ca    8.9      06 Jan 2024 07:18            Urinalysis Basic - ( 06 Jan 2024 07:18 )    Color: x / Appearance: x / SG: x / pH: x  Gluc: 101 mg/dL / Ketone: x  / Bili: x / Urobili: x   Blood: x / Protein: x / Nitrite: x   Leuk Esterase: x / RBC: x / WBC x   Sq Epi: x / Non Sq Epi: x / Bacteria: x            RADIOLOGY & ADDITIONAL TESTS:  New Imaging Personally Reviewed Today:  New Electrocardiogram Personally Reviewed Today:  Other Results Reviewed Today:   Prior or Outpatient Records Reviewed Today with Summary:    COORDINATION OF CARE:  Consultant Communication and Details of Discussion (where applicable):     [facility] Division of Hospital Medicine  Anthony Castro MD  Available via MS Teams    SUBJECTIVE / OVERNIGHT EVENTS:  seen at bedside this am, no acute events overnight, denies any complaints although AX O 1-2 mentation is improved as compared to yesterday, no signs of infection so far     ADDITIONAL REVIEW OF SYSTEMS:    MEDICATIONS  (STANDING):  cefTRIAXone   IVPB 1000 milliGRAM(s) IV Intermittent every 24 hours  influenza  Vaccine (HIGH DOSE) 0.7 milliLiter(s) IntraMuscular once  sodium chloride 0.9%. 1000 milliLiter(s) (40 mL/Hr) IV Continuous <Continuous>    MEDICATIONS  (PRN):  acetaminophen   IVPB .. 1000 milliGRAM(s) IV Intermittent every 6 hours PRN Severe Pain (7 - 10)  oxyCODONE    IR 5 milliGRAM(s) Oral every 4 hours PRN Moderate Pain (4 - 6)  oxyCODONE    IR 10 milliGRAM(s) Oral every 4 hours PRN Severe Pain (7 - 10)      I&O's Summary    05 Jan 2024 07:01  -  06 Jan 2024 07:00  --------------------------------------------------------  IN: 1709 mL / OUT: 1900 mL / NET: -191 mL    06 Jan 2024 07:01  -  06 Jan 2024 16:51  --------------------------------------------------------  IN: 460 mL / OUT: 0 mL / NET: 460 mL        PHYSICAL EXAM:  Vital Signs Last 24 Hrs  T(C): 37.3 (06 Jan 2024 12:30), Max: 37.3 (06 Jan 2024 12:30)  T(F): 99.1 (06 Jan 2024 12:30), Max: 99.1 (06 Jan 2024 12:30)  HR: 71 (06 Jan 2024 12:30) (71 - 96)  BP: 130/63 (06 Jan 2024 12:30) (121/80 - 145/60)  BP(mean): --  RR: 18 (06 Jan 2024 12:30) (18 - 18)  SpO2: 98% (06 Jan 2024 12:30) (96% - 99%)    Parameters below as of 06 Jan 2024 12:30  Patient On (Oxygen Delivery Method): room air      GENERAL: NAD, well-developed, Axo 1-2   HEAD:  Atraumatic, Normocephalic, with bandage at the back of head post surgery   CHEST/LUNG: Clear to auscultation bilaterally; No wheeze  HEART: Regular rate and rhythm; No murmurs, rubs, or gallops  ABDOMEN: Soft, Nontender, Nondistended; Bowel sounds present  EXTREMITIES:  2+ Peripheral Pulses, No clubbing, cyanosis, or edema  PSYCH: AAOx 1-2 post surgery   NEUROLOGY: non-focal  LABS:                        11.6   10.04 )-----------( 314      ( 06 Jan 2024 07:18 )             36.1     01-06    140  |  105  |  6<L>  ----------------------------<  101<H>  3.2<L>   |  22  |  0.48<L>    Ca    8.9      06 Jan 2024 07:18            Urinalysis Basic - ( 06 Jan 2024 07:18 )    Color: x / Appearance: x / SG: x / pH: x  Gluc: 101 mg/dL / Ketone: x  / Bili: x / Urobili: x   Blood: x / Protein: x / Nitrite: x   Leuk Esterase: x / RBC: x / WBC x   Sq Epi: x / Non Sq Epi: x / Bacteria: x            RADIOLOGY & ADDITIONAL TESTS:  New Imaging Personally Reviewed Today:  New Electrocardiogram Personally Reviewed Today:  Other Results Reviewed Today:   Prior or Outpatient Records Reviewed Today with Summary:    COORDINATION OF CARE:  Consultant Communication and Details of Discussion (where applicable):

## 2024-01-06 NOTE — PROGRESS NOTE ADULT - PROBLEM SELECTOR PLAN 1
-known meningioma. CT head w/o vasogenic edema.   - neurosurgery recommended MRI of brain which revealed large left cavernous mass with plan for VPS placement on 1/4/24  -  OPHTHALMOLOGY was consulted with no acute findings  - s/p VPS shunting on 01/5   -Post procedure got vancomycin 4 doses and was placed on ceftriaxone as well as per NSG  -post procedure patient has remained confused AXO 1-2, spoke to NSG and they waned to repeat Head CT which showed no acute changes and showed stable shunt   -mentation is improving now but still AXo 1-2   -advance diet as tolerated as per NSG recs   -need NSG recs about the duration of antibiotics

## 2024-01-06 NOTE — PROGRESS NOTE ADULT - NSPROGADDITIONALINFOA_GEN_ALL_CORE
d/w ACP  pending improvement of mental status and post op course for DC   Pt recommended VALERY   Need clearance from Nsg to start on DVT prophy

## 2024-01-07 LAB
ANION GAP SERPL CALC-SCNC: 12 MMOL/L — SIGNIFICANT CHANGE UP (ref 5–17)
ANION GAP SERPL CALC-SCNC: 12 MMOL/L — SIGNIFICANT CHANGE UP (ref 5–17)
BUN SERPL-MCNC: 10 MG/DL — SIGNIFICANT CHANGE UP (ref 7–23)
BUN SERPL-MCNC: 10 MG/DL — SIGNIFICANT CHANGE UP (ref 7–23)
CALCIUM SERPL-MCNC: 8.6 MG/DL — SIGNIFICANT CHANGE UP (ref 8.4–10.5)
CALCIUM SERPL-MCNC: 8.6 MG/DL — SIGNIFICANT CHANGE UP (ref 8.4–10.5)
CHLORIDE SERPL-SCNC: 106 MMOL/L — SIGNIFICANT CHANGE UP (ref 96–108)
CHLORIDE SERPL-SCNC: 106 MMOL/L — SIGNIFICANT CHANGE UP (ref 96–108)
CO2 SERPL-SCNC: 22 MMOL/L — SIGNIFICANT CHANGE UP (ref 22–31)
CO2 SERPL-SCNC: 22 MMOL/L — SIGNIFICANT CHANGE UP (ref 22–31)
CREAT SERPL-MCNC: 0.51 MG/DL — SIGNIFICANT CHANGE UP (ref 0.5–1.3)
CREAT SERPL-MCNC: 0.51 MG/DL — SIGNIFICANT CHANGE UP (ref 0.5–1.3)
EGFR: 97 ML/MIN/1.73M2 — SIGNIFICANT CHANGE UP
EGFR: 97 ML/MIN/1.73M2 — SIGNIFICANT CHANGE UP
GLUCOSE SERPL-MCNC: 78 MG/DL — SIGNIFICANT CHANGE UP (ref 70–99)
GLUCOSE SERPL-MCNC: 78 MG/DL — SIGNIFICANT CHANGE UP (ref 70–99)
HCT VFR BLD CALC: 36.6 % — SIGNIFICANT CHANGE UP (ref 34.5–45)
HCT VFR BLD CALC: 36.6 % — SIGNIFICANT CHANGE UP (ref 34.5–45)
HGB BLD-MCNC: 11.9 G/DL — SIGNIFICANT CHANGE UP (ref 11.5–15.5)
HGB BLD-MCNC: 11.9 G/DL — SIGNIFICANT CHANGE UP (ref 11.5–15.5)
MCHC RBC-ENTMCNC: 26.3 PG — LOW (ref 27–34)
MCHC RBC-ENTMCNC: 26.3 PG — LOW (ref 27–34)
MCHC RBC-ENTMCNC: 32.5 GM/DL — SIGNIFICANT CHANGE UP (ref 32–36)
MCHC RBC-ENTMCNC: 32.5 GM/DL — SIGNIFICANT CHANGE UP (ref 32–36)
MCV RBC AUTO: 81 FL — SIGNIFICANT CHANGE UP (ref 80–100)
MCV RBC AUTO: 81 FL — SIGNIFICANT CHANGE UP (ref 80–100)
NRBC # BLD: 0 /100 WBCS — SIGNIFICANT CHANGE UP (ref 0–0)
NRBC # BLD: 0 /100 WBCS — SIGNIFICANT CHANGE UP (ref 0–0)
PLATELET # BLD AUTO: 303 K/UL — SIGNIFICANT CHANGE UP (ref 150–400)
PLATELET # BLD AUTO: 303 K/UL — SIGNIFICANT CHANGE UP (ref 150–400)
POTASSIUM SERPL-MCNC: 3.5 MMOL/L — SIGNIFICANT CHANGE UP (ref 3.5–5.3)
POTASSIUM SERPL-MCNC: 3.5 MMOL/L — SIGNIFICANT CHANGE UP (ref 3.5–5.3)
POTASSIUM SERPL-SCNC: 3.5 MMOL/L — SIGNIFICANT CHANGE UP (ref 3.5–5.3)
POTASSIUM SERPL-SCNC: 3.5 MMOL/L — SIGNIFICANT CHANGE UP (ref 3.5–5.3)
RBC # BLD: 4.52 M/UL — SIGNIFICANT CHANGE UP (ref 3.8–5.2)
RBC # BLD: 4.52 M/UL — SIGNIFICANT CHANGE UP (ref 3.8–5.2)
RBC # FLD: 14.6 % — HIGH (ref 10.3–14.5)
RBC # FLD: 14.6 % — HIGH (ref 10.3–14.5)
SODIUM SERPL-SCNC: 140 MMOL/L — SIGNIFICANT CHANGE UP (ref 135–145)
SODIUM SERPL-SCNC: 140 MMOL/L — SIGNIFICANT CHANGE UP (ref 135–145)
WBC # BLD: 9.37 K/UL — SIGNIFICANT CHANGE UP (ref 3.8–10.5)
WBC # BLD: 9.37 K/UL — SIGNIFICANT CHANGE UP (ref 3.8–10.5)
WBC # FLD AUTO: 9.37 K/UL — SIGNIFICANT CHANGE UP (ref 3.8–10.5)
WBC # FLD AUTO: 9.37 K/UL — SIGNIFICANT CHANGE UP (ref 3.8–10.5)

## 2024-01-07 PROCEDURE — 99232 SBSQ HOSP IP/OBS MODERATE 35: CPT

## 2024-01-07 RX ADMIN — OXYCODONE HYDROCHLORIDE 10 MILLIGRAM(S): 5 TABLET ORAL at 08:48

## 2024-01-07 RX ADMIN — OXYCODONE HYDROCHLORIDE 10 MILLIGRAM(S): 5 TABLET ORAL at 07:36

## 2024-01-07 RX ADMIN — CEFTRIAXONE 100 MILLIGRAM(S): 500 INJECTION, POWDER, FOR SOLUTION INTRAMUSCULAR; INTRAVENOUS at 12:29

## 2024-01-07 NOTE — SWALLOW BEDSIDE ASSESSMENT ADULT - SLP GENERAL OBSERVATIONS
Encountered Pt asleep in bed on RA. Daughter at bedside throughout this encounter. Per daughter, Pt speaks fluent English and does not require and . Pt is A&Ox2 (w/ choices), verbally responsive, and able to follow simple commands. +L eye ptosis. +twitching movements noted on the R-side of face during volitional movements.

## 2024-01-07 NOTE — PROGRESS NOTE ADULT - NSPROGADDITIONALINFOA_GEN_ALL_CORE
d/w ACP  pending improvement of mental status and post op course for DC   PT recommended VALERY   Need clearance from Nsg to start on DVT prophy and clearance for DC   updated daughter at bedside on 1/7

## 2024-01-07 NOTE — PROGRESS NOTE ADULT - SUBJECTIVE AND OBJECTIVE BOX
Patient seen and examined at bedside.    --Anticoagulation--    T(C): 36.9 (01-07-24 @ 09:46), Max: 36.9 (01-07-24 @ 00:46)  HR: 89 (01-07-24 @ 09:46) (76 - 89)  BP: 128/73 (01-07-24 @ 09:46) (127/76 - 133/73)  RR: 18 (01-07-24 @ 09:46) (17 - 18)  SpO2: 94% (01-07-24 @ 09:46) (94% - 97%)  Wt(kg): --    Exam: stable, AOx1-2, no drift, MORAN strongly, SILT. Incision clean, dry, intact.

## 2024-01-07 NOTE — SWALLOW BEDSIDE ASSESSMENT ADULT - SLP PERTINENT HISTORY OF CURRENT PROBLEM
78y F w/ PMHx of asthma and meningioma s/p radiation. Of note, Pt fell 11/15 and broke ribs, being tx'd conservatively. Since the fall, Pt has declined in function and is also having some memory loss 1-2 weeks. In ED, Pt found to have UTI. CISS MRI with Cine flow study done - hydro w transependymal flow; s/p L VPS 1/4. Pt w/ increased confusion post-op, slowly improving.

## 2024-01-07 NOTE — PROGRESS NOTE ADULT - REASON FOR ADMISSION
March 20, 2017      Razia Dos Santos  [address] [place] [id number]        Dear Razia,    Please see below for your test results.    It was a pleasure to see you back in clinic. You are doing very well with your prediabetes management with diet and exercise. Your last A1c level is 5.7 which is down from 5.9 6 months ago. You do not need to take any medication for this and you should continue to eat a healthy diet and exercise 30 minutes per day. We do not need to check this again for another year unless you have new symptoms. Please call the clinic or make an appointment if you have any other concerns.     Thank you!      Resulted Orders   Hemoglobin A1c ([facility])   Result Value Ref Range    Hemoglobin A1C 5.7 4.1 - 5.7 %       If you have any questions, please call the clinic to make an appointment.    Sincerely,    Chloe Robbins MD  
weakness
none

## 2024-01-07 NOTE — PROGRESS NOTE ADULT - TIME BILLING
- Ordering, reviewing, and interpreting labs, testing, and imaging.  - Reviewing prior hospitalization   - Counselling and educating patient and family regarding interpretation of aforementioned items and plan of care.  - discussion and reviewing specialists recommendations
- Ordering, reviewing, and interpreting labs, testing, and imaging.  - Reviewing prior hospitalization   - Counselling and educating patient   - discussion and reviewing specialists recommendations
- Ordering, reviewing, and interpreting labs, testing, and imaging.  - Reviewing prior hospitalization   - Counselling and educating patient and family regarding interpretation of aforementioned items and plan of care.  - discussion and reviewing specialists recommendations
Discussion of risks and benefits of surgery
- Ordering, reviewing, and interpreting labs, testing, and imaging.  - Reviewing prior hospitalization   - Counselling and educating patient   - discussion and reviewing specialists recommendations
- Ordering, reviewing, and interpreting labs, testing, and imaging.  - Reviewing prior hospitalization and where necessary  - Counselling and educating patient and family regarding interpretation of aforementioned items and plan of care.
- Ordering, reviewing, and interpreting labs, testing, and imaging.  - Reviewing prior hospitalization   - Counselling and educating patient   - discussion and reviewing specialists recommendations
- Ordering, reviewing, and interpreting labs, testing, and imaging.  - Reviewing prior hospitalization   - Counselling and educating patient and family regarding interpretation of aforementioned items and plan of care.  - discussion and reviewing specialists recommendations
- Ordering, reviewing, and interpreting labs, testing, and imaging.  - Reviewing prior hospitalization   - Counselling and educating patient   - discussion and reviewing specialists recommendations
- Ordering, reviewing, and interpreting labs, testing, and imaging.  - Reviewing prior hospitalization   - Counselling and educating patient   - discussion and reviewing specialists recommendations

## 2024-01-07 NOTE — SWALLOW BEDSIDE ASSESSMENT ADULT - SWALLOW EVAL: PATIENT/FAMILY GOALS STATEMENT
Per daughter at bedside, Pt w/ poor appetite and intake prior to admission; benefits from encouragement for PO intake

## 2024-01-07 NOTE — SWALLOW BEDSIDE ASSESSMENT ADULT - SWALLOW EVAL: DIAGNOSIS
78y F w/ PMHx of asthma and meningioma s/p radiation; s/p L VPS 1/4. Pt presents w/ an overtly functional oral/pharyngeal swallow for regular solids/thin liquids. No overt s/s of laryngeal penetration/aspiration noted across PO trials. Upon palpation, hyolaryngeal excursion and onset of pharyngeal swallow are WNL. Pt benefits from encouragement for PO intake 2/2 poor appetite per family. Pt has no skilled ST needs, this service will sign off at this time.

## 2024-01-07 NOTE — SWALLOW BEDSIDE ASSESSMENT ADULT - COMMENTS
IMAGIN/4 CTH: IMPRESSION: No evidence of acute intracranial hemorrhage or midline shift. Interval placement of ventricular catheter taking a left parieto-occipital approach with tip of the catheter in the left lateral ventricle. Ventricles persistently prominent compared to the sulci in keeping with patient's normal pressure hydrocephalus, grossly unchanged.    ***Pt is not previously known to this service and no prior swallow studies noted in PACS. Of note, no evidence of dysphagia screen in EMR.

## 2024-01-07 NOTE — PROGRESS NOTE ADULT - ASSESSMENT
Exam stable, AOx1-2, no drift, MORAN strongly, SILT. Incision clean, dry, intact.   -CERTAS at 6  -CTH in 2wks 1/18  -Outpt f/u with Dr. Flores thereafter to consider adjusting shunt to 5

## 2024-01-07 NOTE — PROGRESS NOTE ADULT - PROBLEM SELECTOR PLAN 1
-known meningioma. CT head w/o vasogenic edema.   - neurosurgery recommended MRI of brain which revealed large left cavernous mass with plan for VPS placement on 1/4/24  - ophtho was consulted with no acute findings  - s/p VPS shunting on 01/5   -Post procedure got vancomycin 4 doses and was placed on ceftriaxone as well as per NSG  -post procedure patient has remained confused AXO 1-2, spoke to NSG and they waned to repeat Head CT which showed no acute changes and showed stable shunt   -mentation is improving now but still AXo 1-2   -advance diet as tolerated as per NSG  -need NSG recs about the duration of antibiotics   -pending mental status improvement

## 2024-01-07 NOTE — PROGRESS NOTE ADULT - SUBJECTIVE AND OBJECTIVE BOX
[facility] Division of Hospital Medicine  Anthony Castro MD  Available via MS Teams    SUBJECTIVE / OVERNIGHT EVENTS:  seen and examined at bedside, denies any new complaints, improved in mentation as compared to yesterday now off of mittens     ADDITIONAL REVIEW OF SYSTEMS:    MEDICATIONS  (STANDING):  cefTRIAXone   IVPB 1000 milliGRAM(s) IV Intermittent every 24 hours  influenza  Vaccine (HIGH DOSE) 0.7 milliLiter(s) IntraMuscular once    MEDICATIONS  (PRN):  acetaminophen   IVPB .. 1000 milliGRAM(s) IV Intermittent every 6 hours PRN Severe Pain (7 - 10)  oxyCODONE    IR 5 milliGRAM(s) Oral every 4 hours PRN Moderate Pain (4 - 6)  oxyCODONE    IR 10 milliGRAM(s) Oral every 4 hours PRN Severe Pain (7 - 10)      I&O's Summary    06 Jan 2024 07:01  -  07 Jan 2024 07:00  --------------------------------------------------------  IN: 460 mL / OUT: 400 mL / NET: 60 mL    07 Jan 2024 07:01  -  07 Jan 2024 16:04  --------------------------------------------------------  IN: 440 mL / OUT: 0 mL / NET: 440 mL        PHYSICAL EXAM:  Vital Signs Last 24 Hrs  T(C): 36.9 (07 Jan 2024 09:46), Max: 36.9 (07 Jan 2024 00:46)  T(F): 98.5 (07 Jan 2024 09:46), Max: 98.5 (07 Jan 2024 09:46)  HR: 89 (07 Jan 2024 09:46) (76 - 89)  BP: 128/73 (07 Jan 2024 09:46) (127/76 - 133/73)  BP(mean): --  RR: 18 (07 Jan 2024 09:46) (17 - 18)  SpO2: 94% (07 Jan 2024 09:46) (94% - 97%)    Parameters below as of 07 Jan 2024 09:46  Patient On (Oxygen Delivery Method): room air        LABS:                        11.9   9.37  )-----------( 303      ( 07 Jan 2024 07:53 )             36.6     01-07    140  |  106  |  10  ----------------------------<  78  3.5   |  22  |  0.51    Ca    8.6      07 Jan 2024 07:53        GENERAL: NAD, well-developed, Axo 1-2   HEAD:  Atraumatic, Normocephalic, with bandage at the back of head post surgery   CHEST/LUNG: Clear to auscultation bilaterally; No wheeze  HEART: Regular rate and rhythm; No murmurs, rubs, or gallops  ABDOMEN: Soft, Nontender, Nondistended; Bowel sounds present  EXTREMITIES:  2+ Peripheral Pulses, No clubbing, cyanosis, or edema  PSYCH: AAOx 1-2 post surgery   NEUROLOGY: non-focal    Urinalysis Basic - ( 07 Jan 2024 07:53 )    Color: x / Appearance: x / SG: x / pH: x  Gluc: 78 mg/dL / Ketone: x  / Bili: x / Urobili: x   Blood: x / Protein: x / Nitrite: x   Leuk Esterase: x / RBC: x / WBC x   Sq Epi: x / Non Sq Epi: x / Bacteria: x            RADIOLOGY & ADDITIONAL TESTS:  New Imaging Personally Reviewed Today:  New Electrocardiogram Personally Reviewed Today:  Other Results Reviewed Today:   Prior or Outpatient Records Reviewed Today with Summary:    COORDINATION OF CARE:  Consultant Communication and Details of Discussion (where applicable):     [facility] Division of Hospital Medicine  Anthony Castro MD  Available via MS Teams    SUBJECTIVE / OVERNIGHT EVENTS:  seen and examined at bedside, denies any new complaints, improved in mentation as compared to yesterday now off of mittens     ADDITIONAL REVIEW OF SYSTEMS:    MEDICATIONS  (STANDING):  cefTRIAXone   IVPB 1000 milliGRAM(s) IV Intermittent every 24 hours  influenza  Vaccine (HIGH DOSE) 0.7 milliLiter(s) IntraMuscular once    MEDICATIONS  (PRN):  acetaminophen   IVPB .. 1000 milliGRAM(s) IV Intermittent every 6 hours PRN Severe Pain (7 - 10)  oxyCODONE    IR 5 milliGRAM(s) Oral every 4 hours PRN Moderate Pain (4 - 6)  oxyCODONE    IR 10 milliGRAM(s) Oral every 4 hours PRN Severe Pain (7 - 10)      I&O's Summary    06 Jan 2024 07:01  -  07 Jan 2024 07:00  --------------------------------------------------------  IN: 460 mL / OUT: 400 mL / NET: 60 mL    07 Jan 2024 07:01  -  07 Jan 2024 16:04  --------------------------------------------------------  IN: 440 mL / OUT: 0 mL / NET: 440 mL        PHYSICAL EXAM:  Vital Signs Last 24 Hrs  T(C): 36.9 (07 Jan 2024 09:46), Max: 36.9 (07 Jan 2024 00:46)  T(F): 98.5 (07 Jan 2024 09:46), Max: 98.5 (07 Jan 2024 09:46)  HR: 89 (07 Jan 2024 09:46) (76 - 89)  BP: 128/73 (07 Jan 2024 09:46) (127/76 - 133/73)  BP(mean): --  RR: 18 (07 Jan 2024 09:46) (17 - 18)  SpO2: 94% (07 Jan 2024 09:46) (94% - 97%)    Parameters below as of 07 Jan 2024 09:46  Patient On (Oxygen Delivery Method): room air        LABS:                        11.9   9.37  )-----------( 303      ( 07 Jan 2024 07:53 )             36.6     01-07    140  |  106  |  10  ----------------------------<  78  3.5   |  22  |  0.51    Ca    8.6      07 Jan 2024 07:53        GENERAL: NAD, well-developed, Axo 1-2   HEAD:  Atraumatic, Normocephalic, with bandage at the back of head post surgery   CHEST/LUNG: Clear to auscultation bilaterally; No wheeze  HEART: Regular rate and rhythm; No murmurs, rubs, or gallops  ABDOMEN: Soft, Nontender, Nondistended; Bowel sounds present  EXTREMITIES:  2+ Peripheral Pulses, No clubbing, cyanosis, or edema  PSYCH: AAOx 1-2 post surgery   NEUROLOGY: non-focal    Urinalysis Basic - ( 07 Jan 2024 07:53 )    Color: x / Appearance: x / SG: x / pH: x  Gluc: 78 mg/dL / Ketone: x  / Bili: x / Urobili: x   Blood: x / Protein: x / Nitrite: x   Leuk Esterase: x / RBC: x / WBC x   Sq Epi: x / Non Sq Epi: x / Bacteria: x            RADIOLOGY & ADDITIONAL TESTS:  New Imaging Personally Reviewed Today:  New Electrocardiogram Personally Reviewed Today:  Other Results Reviewed Today:   Prior or Outpatient Records Reviewed Today with Summary:    COORDINATION OF CARE:  Consultant Communication and Details of Discussion (where applicable):

## 2024-01-08 ENCOUNTER — APPOINTMENT (OUTPATIENT)
Dept: THORACIC SURGERY | Facility: CLINIC | Age: 79
End: 2024-01-08

## 2024-01-08 PROBLEM — Z00.00 ENCOUNTER FOR PREVENTIVE HEALTH EXAMINATION: Status: ACTIVE | Noted: 2024-01-08

## 2024-01-08 LAB
ANION GAP SERPL CALC-SCNC: 13 MMOL/L — SIGNIFICANT CHANGE UP (ref 5–17)
ANION GAP SERPL CALC-SCNC: 13 MMOL/L — SIGNIFICANT CHANGE UP (ref 5–17)
APPEARANCE UR: CLEAR — SIGNIFICANT CHANGE UP
APPEARANCE UR: CLEAR — SIGNIFICANT CHANGE UP
BACTERIA # UR AUTO: NEGATIVE /HPF — SIGNIFICANT CHANGE UP
BACTERIA # UR AUTO: NEGATIVE /HPF — SIGNIFICANT CHANGE UP
BILIRUB UR-MCNC: NEGATIVE — SIGNIFICANT CHANGE UP
BILIRUB UR-MCNC: NEGATIVE — SIGNIFICANT CHANGE UP
BUN SERPL-MCNC: 10 MG/DL — SIGNIFICANT CHANGE UP (ref 7–23)
BUN SERPL-MCNC: 10 MG/DL — SIGNIFICANT CHANGE UP (ref 7–23)
CALCIUM SERPL-MCNC: 9.2 MG/DL — SIGNIFICANT CHANGE UP (ref 8.4–10.5)
CALCIUM SERPL-MCNC: 9.2 MG/DL — SIGNIFICANT CHANGE UP (ref 8.4–10.5)
CAST: 0 /LPF — SIGNIFICANT CHANGE UP (ref 0–4)
CAST: 0 /LPF — SIGNIFICANT CHANGE UP (ref 0–4)
CHLORIDE SERPL-SCNC: 107 MMOL/L — SIGNIFICANT CHANGE UP (ref 96–108)
CHLORIDE SERPL-SCNC: 107 MMOL/L — SIGNIFICANT CHANGE UP (ref 96–108)
CO2 SERPL-SCNC: 22 MMOL/L — SIGNIFICANT CHANGE UP (ref 22–31)
CO2 SERPL-SCNC: 22 MMOL/L — SIGNIFICANT CHANGE UP (ref 22–31)
COLOR SPEC: YELLOW — SIGNIFICANT CHANGE UP
COLOR SPEC: YELLOW — SIGNIFICANT CHANGE UP
CREAT SERPL-MCNC: 0.47 MG/DL — LOW (ref 0.5–1.3)
CREAT SERPL-MCNC: 0.47 MG/DL — LOW (ref 0.5–1.3)
DIFF PNL FLD: NEGATIVE — SIGNIFICANT CHANGE UP
DIFF PNL FLD: NEGATIVE — SIGNIFICANT CHANGE UP
EGFR: 99 ML/MIN/1.73M2 — SIGNIFICANT CHANGE UP
EGFR: 99 ML/MIN/1.73M2 — SIGNIFICANT CHANGE UP
GLUCOSE SERPL-MCNC: 83 MG/DL — SIGNIFICANT CHANGE UP (ref 70–99)
GLUCOSE SERPL-MCNC: 83 MG/DL — SIGNIFICANT CHANGE UP (ref 70–99)
GLUCOSE UR QL: NEGATIVE MG/DL — SIGNIFICANT CHANGE UP
GLUCOSE UR QL: NEGATIVE MG/DL — SIGNIFICANT CHANGE UP
HCT VFR BLD CALC: 37.6 % — SIGNIFICANT CHANGE UP (ref 34.5–45)
HCT VFR BLD CALC: 37.6 % — SIGNIFICANT CHANGE UP (ref 34.5–45)
HGB BLD-MCNC: 12 G/DL — SIGNIFICANT CHANGE UP (ref 11.5–15.5)
HGB BLD-MCNC: 12 G/DL — SIGNIFICANT CHANGE UP (ref 11.5–15.5)
KETONES UR-MCNC: 80 MG/DL
KETONES UR-MCNC: 80 MG/DL
LEUKOCYTE ESTERASE UR-ACNC: NEGATIVE — SIGNIFICANT CHANGE UP
LEUKOCYTE ESTERASE UR-ACNC: NEGATIVE — SIGNIFICANT CHANGE UP
MCHC RBC-ENTMCNC: 25.8 PG — LOW (ref 27–34)
MCHC RBC-ENTMCNC: 25.8 PG — LOW (ref 27–34)
MCHC RBC-ENTMCNC: 31.9 GM/DL — LOW (ref 32–36)
MCHC RBC-ENTMCNC: 31.9 GM/DL — LOW (ref 32–36)
MCV RBC AUTO: 80.9 FL — SIGNIFICANT CHANGE UP (ref 80–100)
MCV RBC AUTO: 80.9 FL — SIGNIFICANT CHANGE UP (ref 80–100)
NITRITE UR-MCNC: NEGATIVE — SIGNIFICANT CHANGE UP
NITRITE UR-MCNC: NEGATIVE — SIGNIFICANT CHANGE UP
NRBC # BLD: 0 /100 WBCS — SIGNIFICANT CHANGE UP (ref 0–0)
NRBC # BLD: 0 /100 WBCS — SIGNIFICANT CHANGE UP (ref 0–0)
PH UR: 5.5 — SIGNIFICANT CHANGE UP (ref 5–8)
PH UR: 5.5 — SIGNIFICANT CHANGE UP (ref 5–8)
PLATELET # BLD AUTO: 332 K/UL — SIGNIFICANT CHANGE UP (ref 150–400)
PLATELET # BLD AUTO: 332 K/UL — SIGNIFICANT CHANGE UP (ref 150–400)
POTASSIUM SERPL-MCNC: 3.6 MMOL/L — SIGNIFICANT CHANGE UP (ref 3.5–5.3)
POTASSIUM SERPL-MCNC: 3.6 MMOL/L — SIGNIFICANT CHANGE UP (ref 3.5–5.3)
POTASSIUM SERPL-SCNC: 3.6 MMOL/L — SIGNIFICANT CHANGE UP (ref 3.5–5.3)
POTASSIUM SERPL-SCNC: 3.6 MMOL/L — SIGNIFICANT CHANGE UP (ref 3.5–5.3)
PROT UR-MCNC: NEGATIVE MG/DL — SIGNIFICANT CHANGE UP
PROT UR-MCNC: NEGATIVE MG/DL — SIGNIFICANT CHANGE UP
RBC # BLD: 4.65 M/UL — SIGNIFICANT CHANGE UP (ref 3.8–5.2)
RBC # BLD: 4.65 M/UL — SIGNIFICANT CHANGE UP (ref 3.8–5.2)
RBC # FLD: 14.4 % — SIGNIFICANT CHANGE UP (ref 10.3–14.5)
RBC # FLD: 14.4 % — SIGNIFICANT CHANGE UP (ref 10.3–14.5)
RBC CASTS # UR COMP ASSIST: 4 /HPF — SIGNIFICANT CHANGE UP (ref 0–4)
RBC CASTS # UR COMP ASSIST: 4 /HPF — SIGNIFICANT CHANGE UP (ref 0–4)
SODIUM SERPL-SCNC: 142 MMOL/L — SIGNIFICANT CHANGE UP (ref 135–145)
SODIUM SERPL-SCNC: 142 MMOL/L — SIGNIFICANT CHANGE UP (ref 135–145)
SP GR SPEC: 1.02 — SIGNIFICANT CHANGE UP (ref 1–1.03)
SP GR SPEC: 1.02 — SIGNIFICANT CHANGE UP (ref 1–1.03)
SQUAMOUS # UR AUTO: 0 /HPF — SIGNIFICANT CHANGE UP (ref 0–5)
SQUAMOUS # UR AUTO: 0 /HPF — SIGNIFICANT CHANGE UP (ref 0–5)
UROBILINOGEN FLD QL: 0.2 MG/DL — SIGNIFICANT CHANGE UP (ref 0.2–1)
UROBILINOGEN FLD QL: 0.2 MG/DL — SIGNIFICANT CHANGE UP (ref 0.2–1)
WBC # BLD: 9.48 K/UL — SIGNIFICANT CHANGE UP (ref 3.8–10.5)
WBC # BLD: 9.48 K/UL — SIGNIFICANT CHANGE UP (ref 3.8–10.5)
WBC # FLD AUTO: 9.48 K/UL — SIGNIFICANT CHANGE UP (ref 3.8–10.5)
WBC # FLD AUTO: 9.48 K/UL — SIGNIFICANT CHANGE UP (ref 3.8–10.5)
WBC UR QL: 1 /HPF — SIGNIFICANT CHANGE UP (ref 0–5)
WBC UR QL: 1 /HPF — SIGNIFICANT CHANGE UP (ref 0–5)

## 2024-01-08 PROCEDURE — 70450 CT HEAD/BRAIN W/O DYE: CPT | Mod: 26

## 2024-01-08 PROCEDURE — 99232 SBSQ HOSP IP/OBS MODERATE 35: CPT

## 2024-01-08 RX ORDER — LACTULOSE 10 G/15ML
20 SOLUTION ORAL ONCE
Refills: 0 | Status: COMPLETED | OUTPATIENT
Start: 2024-01-08 | End: 2024-01-08

## 2024-01-08 RX ORDER — OXYCODONE HYDROCHLORIDE 5 MG/1
5 TABLET ORAL ONCE
Refills: 0 | Status: DISCONTINUED | OUTPATIENT
Start: 2024-01-08 | End: 2024-01-08

## 2024-01-08 RX ADMIN — OXYCODONE HYDROCHLORIDE 5 MILLIGRAM(S): 5 TABLET ORAL at 17:25

## 2024-01-08 RX ADMIN — OXYCODONE HYDROCHLORIDE 5 MILLIGRAM(S): 5 TABLET ORAL at 20:00

## 2024-01-08 RX ADMIN — LACTULOSE 20 GRAM(S): 10 SOLUTION ORAL at 23:58

## 2024-01-08 RX ADMIN — OXYCODONE HYDROCHLORIDE 5 MILLIGRAM(S): 5 TABLET ORAL at 16:30

## 2024-01-08 RX ADMIN — CEFTRIAXONE 100 MILLIGRAM(S): 500 INJECTION, POWDER, FOR SOLUTION INTRAMUSCULAR; INTRAVENOUS at 11:52

## 2024-01-08 RX ADMIN — LACTULOSE 20 GRAM(S): 10 SOLUTION ORAL at 11:52

## 2024-01-08 RX ADMIN — OXYCODONE HYDROCHLORIDE 5 MILLIGRAM(S): 5 TABLET ORAL at 18:51

## 2024-01-08 NOTE — PROGRESS NOTE ADULT - PROBLEM SELECTOR PLAN 1
-known meningioma. CT head w/o vasogenic edema.   - neurosurgery recommended MRI of brain which revealed large left cavernous mass sp VPS placement on 1/4/24  - ophtho was consulted with no acute findings  - s/p VPS shunting on 01/5   -Post procedure got vancomycin 4 doses and was placed on ceftriaxone as well as per NSG  -post procedure patient has remained confused AXO 2-->repeat Head CT which showed no acute changes and showed stable shunt; this is baseline  - plan for 7 days of abx- can switch to PO cefpodoxime on DC till 1/11

## 2024-01-08 NOTE — CHART NOTE - NSCHARTNOTEFT_GEN_A_CORE
Called by RN to evaluate Pt for c/o headache.  Pt. seen and evaluated at bedside.  Denies palpitations, SOB, diaphoresis, light headedness, N/V/D, abdominal pain.  As per daughter at bedside, pt had HA earlier, some relief with Oxycodone 5 mg but the HA came back again.      As per daughter, pt has been intermittently holding her head and speaking less  c/t yesterday     Vital Signs Last 24 Hrs  T(C): 36.8 (08 Jan 2024 11:00), Max: 37.2 (08 Jan 2024 05:00)  T(F): 98.2 (08 Jan 2024 11:00), Max: 99 (08 Jan 2024 05:00)  HR: 80 (08 Jan 2024 11:00) (80 - 86)  BP: 120/70 (08 Jan 2024 11:00) (120/70 - 124/68)  BP(mean): --  RR: 18 (08 Jan 2024 11:00) (17 - 18)  SpO2: 96% (08 Jan 2024 11:00) (96% - 96%)    Parameters below as of 08 Jan 2024 11:00  Patient On (Oxygen Delivery Method): room air      GENERAL: NAD  HEAD:  dressing  at the back of head post  VPS surgery  on 1/4  CHEST/LUNG: Clear to auscultation bilaterally; No wheeze  HEART: Regular rate and rhythm; No murmurs, rubs, or gallops  ABDOMEN: Soft, Nontender, Nondistended; Bowel sounds present  EXTREMITIES:  no edema  PSYCH: AAOx 2 to person, states she in [place], year is 1970   NEUROLOGY: non-focal        Labs:                        12.0   9.48  )-----------( 332      ( 08 Jan 2024 07:24 )             37.6       01-08    142  |  107  |  10  ----------------------------<  83  3.6   |  22  |  0.47<L>    Ca    9.2      08 Jan 2024 07:23      < from: CT Head No Cont (01.05.24 @ 18:01) >  IMPRESSION: No change since 1/4/2024. Left parietal  shunt catheter.   Large ventricles. No hemorrhage.      ASSESSMENT/PLAN:  78F, presenting for adult FTT after fall and rib fx few weeks ago.  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  s/p VPS shunt on 01/4. Recurrent TOMAS    Spoke to Dr Kathi Liz, hospitalist and on call Neurosurgery at [phone], to obtain HCT  Notify Neurosurgery post HCT   Neuro check Q4h

## 2024-01-08 NOTE — PROGRESS NOTE ADULT - SUBJECTIVE AND OBJECTIVE BOX
Patient is a 78y old  Female who presents with a chief complaint of weakness (2024 16:04)      SUBJECTIVE / OVERNIGHT EVENTS:    [employer] Intrepreter # [id number]    feels well. no headaches, having BMs    ROS:  14 point ROS negative in detail except stated as above    MEDICATIONS  (STANDING):  cefTRIAXone   IVPB 1000 milliGRAM(s) IV Intermittent every 24 hours  influenza  Vaccine (HIGH DOSE) 0.7 milliLiter(s) IntraMuscular once    MEDICATIONS  (PRN):  acetaminophen   IVPB .. 1000 milliGRAM(s) IV Intermittent every 6 hours PRN Severe Pain (7 - 10)  oxyCODONE    IR 5 milliGRAM(s) Oral every 4 hours PRN Moderate Pain (4 - 6)  oxyCODONE    IR 10 milliGRAM(s) Oral every 4 hours PRN Severe Pain (7 - 10)      CAPILLARY BLOOD GLUCOSE        I&O's Summary    2024 07:01  -  2024 07:00  --------------------------------------------------------  IN: 649 mL / OUT: 300 mL / NET: 349 mL    2024 07:01  -  2024 14:28  --------------------------------------------------------  IN: 120 mL / OUT: 300 mL / NET: -180 mL        PHYSICAL EXAM:  Vital Signs Last 24 Hrs  T(C): 36.8 (2024 11:00), Max: 37.2 (2024 05:00)  T(F): 98.2 (2024 11:00), Max: 99 (2024 05:00)  HR: 80 (2024 11:00) (75 - 86)  BP: 120/70 (2024 11:00) (120/70 - 135/71)  BP(mean): --  RR: 18 (2024 11:00) (17 - 18)  SpO2: 96% (2024 11:00) (95% - 96%)    Parameters below as of 2024 11:00  Patient On (Oxygen Delivery Method): room air    GENERAL: NAD, well-developed,  HEAD:  Atraumatic, Normocephalic, with bandage at the back of head post surgery   CHEST/LUNG: Clear to auscultation bilaterally; No wheeze  HEART: Regular rate and rhythm; No murmurs, rubs, or gallops  ABDOMEN: Soft, Nontender, Nondistended; Bowel sounds present  EXTREMITIES:  2+ Peripheral Pulses, No clubbing, cyanosis, or edema  PSYCH: AAOx 2 to person, states she in [place], year is    NEUROLOGY: non-focal        LABS:                        12.0   9.48  )-----------( 332      ( 2024 07:24 )             37.6     01-08    142  |  107  |  10  ----------------------------<  83  3.6   |  22  |  0.47<L>    Ca    9.2      2024 07:23            Urinalysis Basic - ( 2024 13:00 )    Color: Yellow / Appearance: Clear / S.019 / pH: x  Gluc: x / Ketone: 80 mg/dL  / Bili: Negative / Urobili: 0.2 mg/dL   Blood: x / Protein: Negative mg/dL / Nitrite: Negative   Leuk Esterase: Negative / RBC: 4 /HPF / WBC 1 /HPF   Sq Epi: x / Non Sq Epi: 0 /HPF / Bacteria: Negative /HPF        RADIOLOGY & ADDITIONAL TESTS:    Imaging Personally Reviewed:    Consultant(s) Notes Reviewed:      Care Discussed with Consultants/Other Providers:  med OCTAVIO Treadwell Patient is a 78y old  Female who presents with a chief complaint of weakness (2024 16:04)      SUBJECTIVE / OVERNIGHT EVENTS:    Greek Intrepreter # [id number]    feels well. no headaches, having BMs    ROS:  14 point ROS negative in detail except stated as above    MEDICATIONS  (STANDING):  cefTRIAXone   IVPB 1000 milliGRAM(s) IV Intermittent every 24 hours  influenza  Vaccine (HIGH DOSE) 0.7 milliLiter(s) IntraMuscular once    MEDICATIONS  (PRN):  acetaminophen   IVPB .. 1000 milliGRAM(s) IV Intermittent every 6 hours PRN Severe Pain (7 - 10)  oxyCODONE    IR 5 milliGRAM(s) Oral every 4 hours PRN Moderate Pain (4 - 6)  oxyCODONE    IR 10 milliGRAM(s) Oral every 4 hours PRN Severe Pain (7 - 10)      CAPILLARY BLOOD GLUCOSE        I&O's Summary    2024 07:01  -  2024 07:00  --------------------------------------------------------  IN: 649 mL / OUT: 300 mL / NET: 349 mL    2024 07:01  -  2024 14:28  --------------------------------------------------------  IN: 120 mL / OUT: 300 mL / NET: -180 mL        PHYSICAL EXAM:  Vital Signs Last 24 Hrs  T(C): 36.8 (2024 11:00), Max: 37.2 (2024 05:00)  T(F): 98.2 (2024 11:00), Max: 99 (2024 05:00)  HR: 80 (2024 11:00) (75 - 86)  BP: 120/70 (2024 11:00) (120/70 - 135/71)  BP(mean): --  RR: 18 (2024 11:00) (17 - 18)  SpO2: 96% (2024 11:00) (95% - 96%)    Parameters below as of 2024 11:00  Patient On (Oxygen Delivery Method): room air    GENERAL: NAD, well-developed,  HEAD:  Atraumatic, Normocephalic, with bandage at the back of head post surgery   CHEST/LUNG: Clear to auscultation bilaterally; No wheeze  HEART: Regular rate and rhythm; No murmurs, rubs, or gallops  ABDOMEN: Soft, Nontender, Nondistended; Bowel sounds present  EXTREMITIES:  2+ Peripheral Pulses, No clubbing, cyanosis, or edema  PSYCH: AAOx 2 to person, states she in [place], year is    NEUROLOGY: non-focal        LABS:                        12.0   9.48  )-----------( 332      ( 2024 07:24 )             37.6     01-08    142  |  107  |  10  ----------------------------<  83  3.6   |  22  |  0.47<L>    Ca    9.2      2024 07:23            Urinalysis Basic - ( 2024 13:00 )    Color: Yellow / Appearance: Clear / S.019 / pH: x  Gluc: x / Ketone: 80 mg/dL  / Bili: Negative / Urobili: 0.2 mg/dL   Blood: x / Protein: Negative mg/dL / Nitrite: Negative   Leuk Esterase: Negative / RBC: 4 /HPF / WBC 1 /HPF   Sq Epi: x / Non Sq Epi: 0 /HPF / Bacteria: Negative /HPF        RADIOLOGY & ADDITIONAL TESTS:    Imaging Personally Reviewed:    Consultant(s) Notes Reviewed:      Care Discussed with Consultants/Other Providers:  med OCTAVIO Treadwell

## 2024-01-09 LAB
ANION GAP SERPL CALC-SCNC: 12 MMOL/L — SIGNIFICANT CHANGE UP (ref 5–17)
ANION GAP SERPL CALC-SCNC: 12 MMOL/L — SIGNIFICANT CHANGE UP (ref 5–17)
BUN SERPL-MCNC: 8 MG/DL — SIGNIFICANT CHANGE UP (ref 7–23)
BUN SERPL-MCNC: 8 MG/DL — SIGNIFICANT CHANGE UP (ref 7–23)
CALCIUM SERPL-MCNC: 9.3 MG/DL — SIGNIFICANT CHANGE UP (ref 8.4–10.5)
CALCIUM SERPL-MCNC: 9.3 MG/DL — SIGNIFICANT CHANGE UP (ref 8.4–10.5)
CHLORIDE SERPL-SCNC: 107 MMOL/L — SIGNIFICANT CHANGE UP (ref 96–108)
CHLORIDE SERPL-SCNC: 107 MMOL/L — SIGNIFICANT CHANGE UP (ref 96–108)
CO2 SERPL-SCNC: 25 MMOL/L — SIGNIFICANT CHANGE UP (ref 22–31)
CO2 SERPL-SCNC: 25 MMOL/L — SIGNIFICANT CHANGE UP (ref 22–31)
CREAT SERPL-MCNC: 0.47 MG/DL — LOW (ref 0.5–1.3)
CREAT SERPL-MCNC: 0.47 MG/DL — LOW (ref 0.5–1.3)
EGFR: 99 ML/MIN/1.73M2 — SIGNIFICANT CHANGE UP
EGFR: 99 ML/MIN/1.73M2 — SIGNIFICANT CHANGE UP
GLUCOSE SERPL-MCNC: 103 MG/DL — HIGH (ref 70–99)
GLUCOSE SERPL-MCNC: 103 MG/DL — HIGH (ref 70–99)
HCT VFR BLD CALC: 37 % — SIGNIFICANT CHANGE UP (ref 34.5–45)
HCT VFR BLD CALC: 37 % — SIGNIFICANT CHANGE UP (ref 34.5–45)
HGB BLD-MCNC: 12.1 G/DL — SIGNIFICANT CHANGE UP (ref 11.5–15.5)
HGB BLD-MCNC: 12.1 G/DL — SIGNIFICANT CHANGE UP (ref 11.5–15.5)
MCHC RBC-ENTMCNC: 26.1 PG — LOW (ref 27–34)
MCHC RBC-ENTMCNC: 26.1 PG — LOW (ref 27–34)
MCHC RBC-ENTMCNC: 32.7 GM/DL — SIGNIFICANT CHANGE UP (ref 32–36)
MCHC RBC-ENTMCNC: 32.7 GM/DL — SIGNIFICANT CHANGE UP (ref 32–36)
MCV RBC AUTO: 79.7 FL — LOW (ref 80–100)
MCV RBC AUTO: 79.7 FL — LOW (ref 80–100)
NRBC # BLD: 0 /100 WBCS — SIGNIFICANT CHANGE UP (ref 0–0)
NRBC # BLD: 0 /100 WBCS — SIGNIFICANT CHANGE UP (ref 0–0)
PLATELET # BLD AUTO: 341 K/UL — SIGNIFICANT CHANGE UP (ref 150–400)
PLATELET # BLD AUTO: 341 K/UL — SIGNIFICANT CHANGE UP (ref 150–400)
POTASSIUM SERPL-MCNC: 3.4 MMOL/L — LOW (ref 3.5–5.3)
POTASSIUM SERPL-MCNC: 3.4 MMOL/L — LOW (ref 3.5–5.3)
POTASSIUM SERPL-SCNC: 3.4 MMOL/L — LOW (ref 3.5–5.3)
POTASSIUM SERPL-SCNC: 3.4 MMOL/L — LOW (ref 3.5–5.3)
RAPID RVP RESULT: SIGNIFICANT CHANGE UP
RAPID RVP RESULT: SIGNIFICANT CHANGE UP
RBC # BLD: 4.64 M/UL — SIGNIFICANT CHANGE UP (ref 3.8–5.2)
RBC # BLD: 4.64 M/UL — SIGNIFICANT CHANGE UP (ref 3.8–5.2)
RBC # FLD: 14.4 % — SIGNIFICANT CHANGE UP (ref 10.3–14.5)
RBC # FLD: 14.4 % — SIGNIFICANT CHANGE UP (ref 10.3–14.5)
SARS-COV-2 RNA SPEC QL NAA+PROBE: SIGNIFICANT CHANGE UP
SARS-COV-2 RNA SPEC QL NAA+PROBE: SIGNIFICANT CHANGE UP
SODIUM SERPL-SCNC: 144 MMOL/L — SIGNIFICANT CHANGE UP (ref 135–145)
SODIUM SERPL-SCNC: 144 MMOL/L — SIGNIFICANT CHANGE UP (ref 135–145)
WBC # BLD: 8.79 K/UL — SIGNIFICANT CHANGE UP (ref 3.8–10.5)
WBC # BLD: 8.79 K/UL — SIGNIFICANT CHANGE UP (ref 3.8–10.5)
WBC # FLD AUTO: 8.79 K/UL — SIGNIFICANT CHANGE UP (ref 3.8–10.5)
WBC # FLD AUTO: 8.79 K/UL — SIGNIFICANT CHANGE UP (ref 3.8–10.5)

## 2024-01-09 PROCEDURE — 99232 SBSQ HOSP IP/OBS MODERATE 35: CPT

## 2024-01-09 RX ORDER — POTASSIUM CHLORIDE 20 MEQ
20 PACKET (EA) ORAL
Refills: 0 | Status: COMPLETED | OUTPATIENT
Start: 2024-01-09 | End: 2024-01-09

## 2024-01-09 RX ADMIN — Medication 20 MILLIEQUIVALENT(S): at 16:37

## 2024-01-09 RX ADMIN — Medication 20 MILLIEQUIVALENT(S): at 09:40

## 2024-01-09 RX ADMIN — CEFTRIAXONE 100 MILLIGRAM(S): 500 INJECTION, POWDER, FOR SOLUTION INTRAMUSCULAR; INTRAVENOUS at 11:49

## 2024-01-09 RX ADMIN — Medication 20 MILLIEQUIVALENT(S): at 11:49

## 2024-01-09 NOTE — PROGRESS NOTE ADULT - PROBLEM SELECTOR PROBLEM 2
Acute UTI
Adult failure to thrive
Preoperative clearance
Acute UTI
Preoperative clearance
Preoperative clearance

## 2024-01-09 NOTE — PROVIDER CONTACT NOTE (OTHER) - BACKGROUND
Meningioma
pt. s/p PACU and  shunt placement
Meningioma
pt. s/p  shunt placement
pt. admitted for meningioma

## 2024-01-09 NOTE — PROVIDER CONTACT NOTE (OTHER) - ASSESSMENT
pt. a & o x 1-2, denies redness, itching and pain around sites
pt. a & o x 1, not following commands, restless
Pt assessed. Pt AOx2, confused to time and place. Daughter at bedside states she's increasing confused throughout the day but worsening in the past few hours. Provider Norberto Marquis notified and made aware. Provider at bedside to assess pt.
Pt has no BM since 1/1. Pt received a dose of lactulose earlier today. Pt denies pain/ discomfort. Passing Flatus. Bowel sounds normoactive.
pt. a & o x 0

## 2024-01-09 NOTE — PROGRESS NOTE ADULT - PROBLEM SELECTOR PROBLEM 3
Acute UTI
Adult failure to thrive
Closed rib fracture
Acute UTI
Acute UTI
Adult failure to thrive
Adult failure to thrive
Acute UTI
Adult failure to thrive
Acute UTI

## 2024-01-09 NOTE — PROGRESS NOTE ADULT - PROBLEM SELECTOR PROBLEM 5
Prophylactic measure
Closed rib fracture
Prophylactic measure
Prophylactic measure
Closed rib fracture
Prophylactic measure
Closed rib fracture
Closed rib fracture
Prophylactic measure
Closed rib fracture
Closed rib fracture

## 2024-01-09 NOTE — PROGRESS NOTE ADULT - SUBJECTIVE AND OBJECTIVE BOX
Patient is a 78y old  Female who presents with a chief complaint of weakness (07 Jan 2024 16:04)      SUBJECTIVE / OVERNIGHT EVENTS:    feels ok. headache resolved. CTH reviewed with patient and daughter Susana humphreys  declined   ROS:  14 point ROS negative in detail except stated as above    MEDICATIONS  (STANDING):  cefTRIAXone   IVPB 1000 milliGRAM(s) IV Intermittent every 24 hours  influenza  Vaccine (HIGH DOSE) 0.7 milliLiter(s) IntraMuscular once  potassium chloride    Tablet ER 20 milliEquivalent(s) Oral every 2 hours    MEDICATIONS  (PRN):  acetaminophen   IVPB .. 1000 milliGRAM(s) IV Intermittent every 6 hours PRN Severe Pain (7 - 10)      CAPILLARY BLOOD GLUCOSE        I&O's Summary    08 Jan 2024 07:01  -  09 Jan 2024 07:00  --------------------------------------------------------  IN: 168 mL / OUT: 700 mL / NET: -532 mL    09 Jan 2024 07:01  -  09 Jan 2024 14:14  --------------------------------------------------------  IN: 260 mL / OUT: 300 mL / NET: -40 mL        PHYSICAL EXAM:  Vital Signs Last 24 Hrs  T(C): 36.4 (09 Jan 2024 12:00), Max: 36.8 (08 Jan 2024 20:58)  T(F): 97.6 (09 Jan 2024 12:00), Max: 98.3 (08 Jan 2024 20:58)  HR: 82 (09 Jan 2024 12:00) (76 - 84)  BP: 145/66 (09 Jan 2024 12:00) (117/64 - 145/66)  BP(mean): --  RR: 16 (09 Jan 2024 12:00) (16 - 18)  SpO2: 96% (09 Jan 2024 12:00) (93% - 96%)    Parameters below as of 09 Jan 2024 12:00  Patient On (Oxygen Delivery Method): room air    GENERAL: NAD, well-developed,  HEAD:  Atraumatic, Normocephalic, with bandage at the back of head post surgery   CHEST/LUNG: Clear to auscultation bilaterally; No wheeze  HEART: Regular rate and rhythm; No murmurs, rubs, or gallops  ABDOMEN: Soft, Nontender, Nondistended; Bowel sounds present  EXTREMITIES:  2+ Peripheral Pulses, No clubbing, cyanosis, or edema  PSYCH: AAOx 2 to person, states she in [place], year is 1970   NEUROLOGY: non-focal          LABS:                        12.1   8.79  )-----------( 341      ( 09 Jan 2024 06:47 )             37.0     01-09    144  |  107  |  8   ----------------------------<  103<H>  3.4<L>   |  25  |  0.47<L>    Ca    9.3      09 Jan 2024 06:49            Urinalysis Basic - ( 09 Jan 2024 06:49 )    Color: x / Appearance: x / SG: x / pH: x  Gluc: 103 mg/dL / Ketone: x  / Bili: x / Urobili: x   Blood: x / Protein: x / Nitrite: x   Leuk Esterase: x / RBC: x / WBC x   Sq Epi: x / Non Sq Epi: x / Bacteria: x        RADIOLOGY & ADDITIONAL TESTS:    Imaging Personally Reviewed:    Consultant(s) Notes Reviewed:      Care Discussed with Consultants/Other Providers:  kaveh Nunez

## 2024-01-09 NOTE — PROGRESS NOTE ADULT - PROBLEM SELECTOR PLAN 2
-was ordered for CTX in the ED  - Ucx X 2 ( Neg and contaminated urine )   - completed Ceftriaxone TX  since there was plan for surgery --> Neg repeat U/A
Planned for VPshunt   RCRI of 0 with 3.9% chance of  30 days post operative cardiac events   No current medical contraindications to planned intervention
-was ordered for CTX in the ED  - Ucx X 2 ( Neg and contaminated urine )   - completed Ceftriaxone TX  since there was plan for surgery --> Neg repeat U/A
Planned for VPshunt   RCRI of 0 with 3.9% chance of  30 days post operative cardiac events   No current medical contraindications to planned intervention
Planned for VPshunt   RCRI of 0 with 3.9% chance of  30 days post operative cardiac events   No current medical contraindications to planned intervention
-was ordered for CTX in the ED  - Ucx X 2 ( Neg and contaminated urine )   - completed Ceftriaxone TX  since there was plan for surgery --> Neg repeat U/A
-likely d/t decreased activity since fall and rib fx  -PT eval recommends VALERY
Planned for VPshunt   RCRI of 0 with 3.9% chance of  30 days post operative cardiac events   No current medical contraindications to planned intervention
-was ordered for CTX in the ED  - Ucx X 2 ( Neg and contaminated urine )   - completed Ceftriaxone TX  since there was plan for surgery --> Neg repeat U/A
Planned for VPshunt   RCRI of 0 with 3.9% chance of  30 days post operative cardiac events   No current medical contraindications to planned intervention

## 2024-01-09 NOTE — PROGRESS NOTE ADULT - PROBLEM SELECTOR PROBLEM 4
Closed rib fracture
Adult failure to thrive
Adult failure to thrive
Closed rib fracture
Meningioma
Adult failure to thrive
Closed rib fracture
Adult failure to thrive
Adult failure to thrive
Closed rib fracture

## 2024-01-09 NOTE — PROVIDER CONTACT NOTE (OTHER) - SITUATION
Last BM 1/1
pt. confused, pulling at steri-strips, surgical gauze on head, and IV tubing
pt. has redness around abdominal steri-strips
Pt showing signs of altered mental status. Started to become increasingly confused on day shift. Pts daughter at bedside stating shes definitely more confused then early and giving blank stares.
pt. increasingly confused, stating she "forgot her name"

## 2024-01-09 NOTE — PROGRESS NOTE ADULT - PROBLEM SELECTOR PROBLEM 1
Meningioma
Meningioma
Acute UTI
Meningioma

## 2024-01-09 NOTE — PROVIDER CONTACT NOTE (OTHER) - REASON
pt. increasingly confused
Last BM 1/1
pt. restless and confused
Altered mental status
pt. has redness around steri-strips

## 2024-01-09 NOTE — PROVIDER CONTACT NOTE (OTHER) - ACTION/TREATMENT ORDERED:
Pending CT of head
CT scan of the head
mitten restraints
Lactulose
continue to monitor, re-assess in AM

## 2024-01-09 NOTE — PROGRESS NOTE ADULT - PROVIDER SPECIALTY LIST ADULT
Hospitalist
Neurosurgery
Surgery
Neurosurgery
Hospitalist
Neurosurgery
Hospitalist

## 2024-01-09 NOTE — PROGRESS NOTE ADULT - PROBLEM SELECTOR PLAN 1
-known meningioma. CT head w/o vasogenic edema;  - neurosurgery recommended MRI of brain which revealed large left cavernous mass sp VPS placement on 1/4/24  - ophtho was consulted with no acute findings  - s/p VPS shunting on 01/5   -Post procedure got vancomycin 4 doses and was placed on ceftriaxone as well as per NSG  -post procedure patient has remained confused AXO 2-->repeat Head CT x 2 latest 1/8 which showed no acute changes and showed stable shunt; this is baseline  - plan for 7 days of abx- can switch to PO cefpodoxime on DC till 1/11

## 2024-01-10 ENCOUNTER — TRANSCRIPTION ENCOUNTER (OUTPATIENT)
Age: 79
End: 2024-01-10

## 2024-01-10 VITALS
TEMPERATURE: 98 F | SYSTOLIC BLOOD PRESSURE: 120 MMHG | OXYGEN SATURATION: 93 % | HEART RATE: 85 BPM | DIASTOLIC BLOOD PRESSURE: 62 MMHG | RESPIRATION RATE: 18 BRPM

## 2024-01-10 DIAGNOSIS — Z98.2 PRESENCE OF CEREBROSPINAL FLUID DRAINAGE DEVICE: ICD-10-CM

## 2024-01-10 PROCEDURE — 99239 HOSP IP/OBS DSCHRG MGMT >30: CPT

## 2024-01-10 RX ORDER — ACETAMINOPHEN 500 MG
650 TABLET ORAL EVERY 6 HOURS
Refills: 0 | Status: DISCONTINUED | OUTPATIENT
Start: 2024-01-10 | End: 2024-01-10

## 2024-01-10 RX ORDER — CEFPODOXIME PROXETIL 100 MG
2 TABLET ORAL
Qty: 120 | Refills: 0
Start: 2024-01-10 | End: 2024-02-08

## 2024-01-10 RX ADMIN — CEFTRIAXONE 100 MILLIGRAM(S): 500 INJECTION, POWDER, FOR SOLUTION INTRAMUSCULAR; INTRAVENOUS at 11:43

## 2024-01-10 RX ADMIN — Medication 650 MILLIGRAM(S): at 14:00

## 2024-01-10 RX ADMIN — Medication 650 MILLIGRAM(S): at 13:07

## 2024-01-10 NOTE — CHART NOTE - NSCHARTNOTESELECT_GEN_ALL_CORE
Neurology/Event Note
recurrent HA/Event Note
dc note
neurosurgery/Event Note
Brief Progress Note
Event Note

## 2024-01-10 NOTE — CHART NOTE - NSCHARTNOTEFT_GEN_A_CORE
patients fell. daughter Susana feels like her personality is coming back. no headaches. plan for VALERY today. dc time 55 min.

## 2024-01-10 NOTE — DISCHARGE NOTE NURSING/CASE MANAGEMENT/SOCIAL WORK - NSDCPETBCESMAN_GEN_ALL_CORE
----- Message from Inder Dorsey MD sent at 4/19/2017  8:49 AM CDT -----  We can see him at the earliest availability/waiting list. An MRI of TS with STIR sequence will be helpful if can be ordered by the referring provider. That way we will know the situation better and about need to see him sooner. Thanks.    ----- Message -----     From: Lucina Webb     Sent: 4/19/2017   8:20 AM       To: MD Dr. Sunita Adams:  Are we able to schedule this patient in our clinic or refer him to orthopedics?  Below is the order for this patient.    Thank you!  Christina     STU Marques:  Mrn#[[id number]]  YOB: 1935 SSN:   Age: 82 yrs Sex: Male  Home phone: [phone]   Mobile Phone:[phone]    Address: [address] [place] [address]     Referral Information [[id number]]  Patient: STU BYRNE [[id number]] MRN: [id number]      Consult & Treatment    Diagnosis: 733.13 (ICD-9-CM) - M48.54XA (ICD-10-CM) - Non-traumatic compression fracture of T3 thoracic vertebra, initial encounter      Procedures: 9026 - SERVICE TO ORTHOPEDICS    Start: Apr 17, 2017 Expiration: Apr 17, 2018     Referring Location: [facility] [place] :    Referring Department: [facility]          If you are a smoker, it is important for your health to stop smoking. Please be aware that second hand smoke is also harmful.

## 2024-01-10 NOTE — DISCHARGE NOTE NURSING/CASE MANAGEMENT/SOCIAL WORK - PATIENT PORTAL LINK FT
You can access the FollowMyHealth Patient Portal offered by [facility] by registering at the following website: [email or web address]. By joining [facility]’s FollowMyHealth portal, you will also be able to view your health information using other applications (apps) compatible with our system. You can access the FollowMyHealth Patient Portal offered by [facility] by registering at the following website: [email or web address]. By joining [facility]’s FollowMyHealth portal, you will also be able to view your health information using other applications (apps) compatible with our system.

## 2024-01-10 NOTE — DISCHARGE NOTE NURSING/CASE MANAGEMENT/SOCIAL WORK - NSDCPEFALRISK_GEN_ALL_CORE
For information on Fall & Injury Prevention, visit: [email or web address] OR  [email or web address] OR  https://www.cdc.gov/steadi/patient.html For information on Fall & Injury Prevention, visit: [email or web address] OR  [email or web address] OR  https://www.cdc.gov/steadi/patient.html

## 2024-01-16 ENCOUNTER — TRANSCRIPTION ENCOUNTER (OUTPATIENT)
Age: 79
End: 2024-01-16

## 2024-01-19 PROBLEM — D32.9 MENINGIOMA: Status: ACTIVE | Noted: 2024-01-11

## 2024-01-22 ENCOUNTER — APPOINTMENT (OUTPATIENT)
Dept: CT IMAGING | Facility: IMAGING CENTER | Age: 79
End: 2024-01-22
Payer: MEDICARE

## 2024-01-22 ENCOUNTER — OUTPATIENT (OUTPATIENT)
Dept: OUTPATIENT SERVICES | Facility: HOSPITAL | Age: 79
LOS: 1 days | End: 2024-01-22
Payer: MEDICARE

## 2024-01-22 DIAGNOSIS — D32.9 BENIGN NEOPLASM OF MENINGES, UNSPECIFIED: ICD-10-CM

## 2024-01-22 DIAGNOSIS — G91.9 HYDROCEPHALUS, UNSPECIFIED: ICD-10-CM

## 2024-01-22 PROCEDURE — 70450 CT HEAD/BRAIN W/O DYE: CPT | Mod: 26

## 2024-01-22 PROCEDURE — 70450 CT HEAD/BRAIN W/O DYE: CPT

## 2024-01-23 ENCOUNTER — APPOINTMENT (OUTPATIENT)
Dept: NEUROSURGERY | Facility: CLINIC | Age: 79
End: 2024-01-23
Payer: MEDICARE

## 2024-01-23 ENCOUNTER — NON-APPOINTMENT (OUTPATIENT)
Age: 79
End: 2024-01-23

## 2024-01-23 VITALS
HEART RATE: 84 BPM | TEMPERATURE: 98.1 F | BODY MASS INDEX: 19.83 KG/M2 | HEIGHT: 61 IN | WEIGHT: 105 LBS | OXYGEN SATURATION: 96 % | SYSTOLIC BLOOD PRESSURE: 108 MMHG | DIASTOLIC BLOOD PRESSURE: 61 MMHG

## 2024-01-23 DIAGNOSIS — D32.9 BENIGN NEOPLASM OF MENINGES, UNSPECIFIED: ICD-10-CM

## 2024-01-23 DIAGNOSIS — G91.9 HYDROCEPHALUS, UNSPECIFIED: ICD-10-CM

## 2024-01-23 PROCEDURE — 99024 POSTOP FOLLOW-UP VISIT: CPT

## 2024-02-22 PROBLEM — S22.42XD CLOSED FRACTURE OF MULTIPLE RIBS OF LEFT SIDE WITH ROUTINE HEALING, SUBSEQUENT ENCOUNTER: Status: ACTIVE | Noted: 2024-01-02

## 2024-02-22 NOTE — HISTORY OF PRESENT ILLNESS
[FreeTextEntry1] : Ms. KIM PADILLA, 78 year old female, PMHX of OP and a meningioma presented to ED on 11/23/2023 for fall x1 week ago. Found to have multiple rib fractures (Left 4th-7th ribs). CT Surgery called for surgical eval. No Thoracic Surgical intervention indicated. Patient was discharged on 11/29/2023. Patient went back to ED on 12/10/2023 for SOB. Patient was discharged same day.   CT chest on 12/10/2023: - Subacute left third through seventh rib fractures. No associated pneumothorax. - Loss of height of vertebral bodies, most prominent at the level of T8. - Cholelithiasis  CXR today:   Patient is here today for a follow up.

## 2024-02-22 NOTE — ASSESSMENT
[FreeTextEntry1] : Ms. KIM PADILLA, 78 year old female, PMHX of OP and a meningioma presented to ED on 11/23/2023 for fall x1 week ago. Found to have multiple rib fractures (Left 4th-7th ribs). CT Surgery called for surgical eval. No Thoracic Surgical intervention indicated. Patient was discharged on 11/29/2023. Patient went back to ED on 12/10/2023 for SOB. Patient was discharged same day.   I have reviewed the patient's medical records and diagnostic images at time of this office consultation and have made the following recommendation: 1.	  I, JARED Kay, personally performed the evaluation and management (E/M) services for this established patient who follow up today with an existing condition.  That E/M includes conducting the examination, assessing all new/exacerbated/existing conditions, and establishing a plan of care.  Today, my ACP, MEGA TavarezC, was here to observe my evaluation and management services for this existing condition to be followed going forward.

## 2024-02-26 ENCOUNTER — APPOINTMENT (OUTPATIENT)
Dept: THORACIC SURGERY | Facility: CLINIC | Age: 79
End: 2024-02-26

## 2024-02-26 DIAGNOSIS — S22.42XD MULTIPLE FRACTURES OF RIBS, LEFT SIDE, SUBSEQUENT ENCOUNTER FOR FRACTURE WITH ROUTINE HEALING: ICD-10-CM

## 2024-03-05 ENCOUNTER — APPOINTMENT (OUTPATIENT)
Dept: FAMILY MEDICINE | Facility: CLINIC | Age: 79
End: 2024-03-05

## 2024-03-05 DIAGNOSIS — R26.81 UNSTEADINESS ON FEET: ICD-10-CM

## 2024-03-05 PROBLEM — G91.9 HYDROCEPHALUS, UNSPECIFIED TYPE: Status: ACTIVE | Noted: 2024-01-11

## 2024-03-07 PROBLEM — R26.81 UNSTEADY GAIT: Status: ACTIVE | Noted: 2024-03-07

## 2024-03-07 NOTE — HISTORY OF PRESENT ILLNESS
[FreeTextEntry1] : Ms. Worthington is a very pleasant 78-year-old female with a  history of a known left parasellar meningioma diagnosed in 2015 status post stereotactic radiosurgery in 08/2023 by Dr. Lewis who presents with several weeks of difficulty with ambulation  and magnetic gait, memory loss, confusion and an episode of urinary incontinence.  She denies any positional headaches, nausea or vomiting.  Serial MRIs demonstrated a progressive increase in size of her ventricles  since 2019.  The left temporal horn appears larger than the other ventricles.  However, there is, however, all ventricles have enlarged progressively over time and there is some encephalomalacia in the left temporal lobe.  There does not appear to be  any obstruction in the ventricular system.  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  A left ventriculoperitoneal shunt was planned because the ventricular  system was larger of the left side and thus would likely be easier and safe to place the ventricular catheter.  After risks and benefits of the surgery were discussed with the patient and family they wished to proceed.  She is now s/p VPS placement on 1/4/24 (Certas valve set at 6). Today her son reports her memory has improved. She is now able to lift herself up, although he believes she is still having difficulty walking because she is being overly cautious. He states her baseline personality is returning to normal, citing that her sense of humor is back. She completed CTH noncontrast yesterday.

## 2024-03-07 NOTE — ASSESSMENT
[FreeTextEntry1] :  78-year-old female with a  history of a known left parasellar meningioma diagnosed in 2015 status post stereotactic radiosurgery in 08/2023 who presents with several weeks of difficulty with ambulation  and magnetic gait, memory loss, confusion and an episode of urinary incontinence. She is now s/p VPS placement on 1/4/24 (Certas valve set at 6). She is doing well. Per son her memory is improving and personality is returning to baseline. Her gait is still shuffling and magnetic but she is working with PT at rehab. CTH from 1/22/24- stable improving.  - Incision clean, dry, intact. Sutures removed. - Follow up in office at 3 months postop.

## 2024-03-07 NOTE — REASON FOR VISIT
[de-identified] :  Left parietal ventriculoperitoneal shunt (Certas valve set at 6) [de-identified] : 1/4/24

## 2024-03-07 NOTE — PHYSICAL EXAM
[General Appearance - Alert] : alert [General Appearance - In No Acute Distress] : in no acute distress [General Appearance - Well Nourished] : well nourished [General Appearance - Well Developed] : well developed [General Appearance - Well-Appearing] : healthy appearing [Clean] : clean [Dry] : dry [Intact] : intact [Oriented To Time, Place, And Person] : oriented to person, place, and time [Cranial Nerves Optic (II)] : visual acuity intact bilaterally,  pupils equal round and reactive to light [Cranial Nerves Trigeminal (V)] : facial sensation intact symmetrically [Cranial Nerves Oculomotor (III)] : extraocular motion intact [Cranial Nerves Facial (VII)] : face symmetrical [Cranial Nerves Vestibulocochlear (VIII)] : hearing was intact bilaterally [Cranial Nerves Glossopharyngeal (IX)] : tongue and palate midline [Cranial Nerves Accessory (XI - Cranial And Spinal)] : head turning and shoulder shrug symmetric [Cranial Nerves Hypoglossal (XII)] : there was no tongue deviation with protrusion [Motor Tone] : muscle tone was normal in all four extremities [Sensation Tactile Decrease] : light touch was intact [Limited Balance] : the patient's balance was impaired [Sclera] : the sclera and conjunctiva were normal [PERRL With Normal Accommodation] : pupils were equal in size, round, reactive to light, with normal accommodation [Extraocular Movements] : extraocular movements were intact [Outer Ear] : the ears and nose were normal in appearance [Neck Appearance] : the appearance of the neck was normal [] : no respiratory distress [FreeTextEntry1] : SHe is oriented to her self, place with choices, not oriented to time. [FreeTextEntry8] : shuffling, magnetic gait

## 2024-03-26 ENCOUNTER — APPOINTMENT (OUTPATIENT)
Dept: NEUROSURGERY | Facility: CLINIC | Age: 79
End: 2024-03-26
Payer: MEDICARE

## 2024-03-26 DIAGNOSIS — Z09 ENCOUNTER FOR FOLLOW-UP EXAMINATION AFTER COMPLETED TREATMENT FOR CONDITIONS OTHER THAN MALIGNANT NEOPLASM: ICD-10-CM

## 2024-03-26 DIAGNOSIS — G91.2 (IDIOPATHIC) NORMAL PRESSURE HYDROCEPHALUS: ICD-10-CM

## 2024-03-26 DIAGNOSIS — Z98.2 PRESENCE OF CEREBROSPINAL FLUID DRAINAGE DEVICE: ICD-10-CM

## 2024-03-26 PROCEDURE — 99024 POSTOP FOLLOW-UP VISIT: CPT

## 2024-03-26 NOTE — HISTORY OF PRESENT ILLNESS
[FreeTextEntry1] : Ms. Worthington is a very pleasant 78-year-old female with a history of a known left parasellar meningioma diagnosed in 2015 status post stereotactic radiosurgery in 08/2023 by Dr. Lewis who presents with several weeks of difficulty with ambulation and magnetic gait, memory loss, confusion and an episode of urinary incontinence. She denies any positional headaches, nausea or vomiting. Serial MRIs demonstrated a progressive increase in size of her ventricles since 2019. The left temporal horn appears larger than the other ventricles. However, there is, however, all ventricles have enlarged progressively over time and there is some encephalomalacia in the left temporal lobe. There does not appear to be any obstruction in the ventricular system. 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 A left ventriculoperitoneal shunt was planned because the ventricular system was larger of the left side and thus would likely be easier and safe to place the ventricular catheter. After risks and benefits of the surgery were discussed with the patient and family they wished to proceed.  She is now s/p VPS placement on 1/4/24 (Certas valve set at 6). Today her son reports her memory has improved. She is now able to lift herself up, although he believes she is still having difficulty walking because she is being overly cautious. He states her baseline personality is returning to normal, citing that her sense of humor is back. She completed CTH noncontrast yesterday.  3/23/24: Per her daughter via email: "MRI looks good, shunt is working no malfunctions. Neurosurgery did decide to lower it to 4. They believe that the infection in chest caused my mom's body to regress. She is being treated for it and still in hospital. They put in for rehab since she is still struggling with walking and balance. She was accepted into a facility, now we're waiting for insurance to approve it. Since the lowering of shunt, my mom is more aware and almost sounds like her normal self."  3/26/24: Per son via telephone: Patient is still inpatient at [facility] in [place]. He reports she is acting more like herself and he feels like she has improved mentation since the shunt adjustment to 4. Hospital is working on getting her to rehab.

## 2024-03-26 NOTE — ASSESSMENT
[FreeTextEntry1] : 78-year-old female with a history of a known left parasellar meningioma diagnosed in 2015 status post stereotactic radiosurgery in 08/2023 who presents with several weeks of difficulty with ambulation and magnetic gait, memory loss, confusion and an episode of urinary incontinence. She is now s/p VPS placement on 1/4/24. Currently she is inpatient at a hospital in [place]. Neurosurgery adjusted her valve to 4 (Certas) on 3/21/24. Per son she is acting more like herself and he feels like she has improved mentation since the shunt adjustment to 4.   - Recommend CTH two weeks from shunt adjustment (on or around 4/4/24). CTH noncontrast ordered and sent to daughter Susana's email.

## 2024-05-09 ENCOUNTER — NON-APPOINTMENT (OUTPATIENT)
Age: 79
End: 2024-05-09

## 2024-05-22 PROBLEM — Z86.79 PERSONAL HISTORY OF OTHER DISEASES OF THE CIRCULATORY SYSTEM: Chronic | Status: ACTIVE | Noted: 2023-07-04

## 2024-05-22 PROBLEM — D32.9 BENIGN NEOPLASM OF MENINGES, UNSPECIFIED: Chronic | Status: ACTIVE | Noted: 2023-11-24

## 2024-05-22 PROBLEM — M81.0 AGE-RELATED OSTEOPOROSIS WITHOUT CURRENT PATHOLOGICAL FRACTURE: Chronic | Status: ACTIVE | Noted: 2023-07-04

## 2024-05-22 RX ORDER — OXYCODONE HYDROCHLORIDE 5 MG/1
0 TABLET ORAL
Qty: 0 | Refills: 0 | DISCHARGE

## 2024-05-22 RX ORDER — NALOXONE HYDROCHLORIDE 4 MG/.1ML
0 SPRAY NASAL
Qty: 0 | Refills: 0 | DISCHARGE

## 2024-08-15 ENCOUNTER — APPOINTMENT (OUTPATIENT)
Dept: CT IMAGING | Facility: CLINIC | Age: 79
End: 2024-08-15

## 2024-08-15 PROCEDURE — 70450 CT HEAD/BRAIN W/O DYE: CPT | Mod: 26

## 2024-08-21 ENCOUNTER — NON-APPOINTMENT (OUTPATIENT)
Age: 79
End: 2024-08-21

## 2024-08-22 ENCOUNTER — APPOINTMENT (OUTPATIENT)
Dept: NEUROSURGERY | Facility: CLINIC | Age: 79
End: 2024-08-22
Payer: MEDICARE

## 2024-08-22 VITALS
RESPIRATION RATE: 16 BRPM | HEIGHT: 61 IN | DIASTOLIC BLOOD PRESSURE: 79 MMHG | OXYGEN SATURATION: 98 % | SYSTOLIC BLOOD PRESSURE: 149 MMHG | BODY MASS INDEX: 24.17 KG/M2 | WEIGHT: 128 LBS | HEART RATE: 69 BPM

## 2024-08-22 DIAGNOSIS — D32.9 BENIGN NEOPLASM OF MENINGES, UNSPECIFIED: ICD-10-CM

## 2024-08-22 DIAGNOSIS — Z98.2 PRESENCE OF CEREBROSPINAL FLUID DRAINAGE DEVICE: ICD-10-CM

## 2024-08-22 DIAGNOSIS — G91.2 (IDIOPATHIC) NORMAL PRESSURE HYDROCEPHALUS: ICD-10-CM

## 2024-08-22 PROCEDURE — 99205 OFFICE O/P NEW HI 60 MIN: CPT

## 2024-08-22 PROCEDURE — 99215 OFFICE O/P EST HI 40 MIN: CPT

## 2024-08-27 NOTE — PROCEDURE
[FreeTextEntry1] : Ventriculoperitoneal shunt system evaluated-non tender, no swelling, no redness, no impingement. Valve fills easily. Codman Certas VPS set at 4 . No changes made to the setting today. Patient education provided with positive feedback. Patient to contact the office immediately for new or worsening symptoms.

## 2024-08-27 NOTE — PHYSICAL EXAM
[General Appearance - Alert] : alert [General Appearance - In No Acute Distress] : in no acute distress [Oriented To Time, Place, And Person] : oriented to person, place, and time [Affect] : the affect was normal [Person] : oriented to person [Place] : oriented to place [Time] : oriented to time [Balance] : balance was intact [Abnormal Walk] : normal gait [Over the Past 2 Weeks, Have You Felt Down, Depressed, or Hopeless?] : 1.) Over the past 2 weeks, have you felt down, depressed, or hopeless? No [Over the Past 2 Weeks, Have You Felt Little Interest or Pleasure Doing Things?] : 2.) Over the past 2 weeks, have you felt little interest or pleasure doing things? No [Short Term Intact] : short term memory intact [Remote Intact] : remote memory intact [Span Intact] : the attention span was normal [Concentration Intact] : normal concentrating ability [Fluency] : fluency intact [Comprehension] : comprehension intact [Current Events] : adequate knowledge of current events [Past History] : adequate knowledge of personal past history [Vocabulary] : adequate range of vocabulary [Cranial Nerves Optic (II)] : visual acuity intact bilaterally,  pupils equal round and reactive to light [Cranial Nerves Oculomotor (III)] : extraocular motion intact [Cranial Nerves Trigeminal (V)] : facial sensation intact symmetrically [Cranial Nerves Facial (VII)] : face symmetrical [Cranial Nerves Vestibulocochlear (VIII)] : hearing was intact bilaterally [Cranial Nerves Glossopharyngeal (IX)] : tongue and palate midline [Cranial Nerves Accessory (XI - Cranial And Spinal)] : head turning and shoulder shrug symmetric [Cranial Nerves Hypoglossal (XII)] : there was no tongue deviation with protrusion [Motor Tone] : muscle tone was normal in all four extremities [Motor Strength] : muscle strength was normal in all four extremities [No Muscle Atrophy] : normal bulk in all four extremities [Sensation Tactile Decrease] : light touch was intact [Past-pointing] : there was no past-pointing [Tremor] : no tremor present [2+] : Patella left 2+

## 2024-08-27 NOTE — ASSESSMENT
[FreeTextEntry1] : Discussion Ct scan looks stable Clinically doing well    Meningioma is stable in size as per CT scan   Shunt to remain at 4 - clinically improved  MRI followed by office visit in 12/2024 Consult with neuro ophthalmologist Dr. Smith Consult with skull base specialist Dr. Bailon - the meningioma is in a high-risk location- it is close to optic nerve- surgical risk- include but not limited to stroke and complete vision loss in left eye.  Consult with general surgeon Mendez Ramos regarding abdominal pain.   .IZaheer evaluated the patient with the nurse practitioner Joann Barthelemy and established the plan of care. I personally discuss this patient with the nurse practitioner at the time of the visit. I agree with the assessment and plan as written, unless noted below.

## 2024-08-27 NOTE — REASON FOR VISIT
[Follow-Up: _____] : a [unfilled] follow-up visit [FreeTextEntry1] : Ms. Worthington is a very pleasant 78-year-old female with a history of a known left parasellar meningioma diagnosed in 2015 status post stereotactic radiosurgery in 08/2023 by Dr. Lewis who presents with several weeks of difficulty with ambulation and magnetic gait, memory loss, confusion and an episode of urinary incontinence. She denies any positional headaches, nausea or vomiting. Serial MRIs demonstrated a progressive increase in size of her ventricles since 2019. The left temporal horn appears larger than the other ventricles. However, there is, however, all ventricles have enlarged progressively over time and there is some encephalomalacia in the left temporal lobe. There does not appear to be any obstruction in the ventricular system. 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 A left ventriculoperitoneal shunt was planned because the ventricular system was larger of the left side and thus would likely be easier and safe to place the ventricular catheter. After risks and benefits of the surgery were discussed with the patient and family they wished to proceed.   S/P Codman Certas VPS placement on 1/4/24  3/23/24: Per her daughter via email: "MRI looks good, shunt is working no malfunctions. Neurosurgery did decide to lower it to 4. They believe that the infection in chest caused my mom's body to regress. She is being treated for it and still in hospital. They put in for rehab since she is still struggling with walking and balance. She was accepted into a facility, now we're waiting for insurance to approve it. Since the lowering of shunt, my mom is more aware and almost sounds like her normal self."  3/26/24: Per son via telephone: Patient is still inpatient at [facility] in [place]. He reports she is acting more like herself, and he feels like she has improved mentation since the shunt adjustment to 4. Hospital is working on getting her to rehab.  4/5/24- CTH completed in [place] was stable. Shunt remained at 4  Today 8/22/2024- pt reports some left eye blurriness and discomfort and reports intermittent sharp left sided abdominal pain. Otherwise, both patient and son feel like she has improved cognitively and physically.  Certas shunt at 4 Gait steady with walker,  Left eye 20/200

## 2024-08-27 NOTE — REASON FOR VISIT
[Follow-Up: _____] : a [unfilled] follow-up visit [FreeTextEntry1] : Ms. Worthington is a very pleasant 78-year-old female with a history of a known left parasellar meningioma diagnosed in 2015 status post stereotactic radiosurgery in 08/2023 by Dr. Lewis who presents with several weeks of difficulty with ambulation and magnetic gait, memory loss, confusion and an episode of urinary incontinence. She denies any positional headaches, nausea or vomiting. Serial MRIs demonstrated a progressive increase in size of her ventricles since 2019. The left temporal horn appears larger than the other ventricles. However, there is, however, all ventricles have enlarged progressively over time and there is some encephalomalacia in the left temporal lobe. There does not appear to be any obstruction in the ventricular system. 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 A left ventriculoperitoneal shunt was planned because the ventricular system was larger of the left side and thus would likely be easier and safe to place the ventricular catheter. After risks and benefits of the surgery were discussed with the patient and family they wished to proceed.   S/P Codman Certas VPS placement on 1/4/24  3/23/24: Per her daughter via email: "MRI looks good, shunt is working no malfunctions. Neurosurgery did decide to lower it to 4. They believe that the infection in chest caused my mom's body to regress. She is being treated for it and still in hospital. They put in for rehab since she is still struggling with walking and balance. She was accepted into a facility, now we're waiting for insurance to approve it. Since the lowering of shunt, my mom is more aware and almost sounds like her normal self."  3/26/24: Per son via telephone: [facility] [place]. He reports she is acting more like herself, and he feels like she has improved mentation since the shunt adjustment to 4. Hospital is working on getting her to rehab.  4/5/24- CTH completed in [place] was stable. Shunt remained at 4  Today 8/22/2024- pt reports some left eye blurriness and discomfort and reports intermittent sharp left sided abdominal pain. Otherwise, both patient and son feel like she has improved cognitively and physically.  Certas shunt at 4 Gait steady with walker,  Left eye 20/200

## 2024-08-27 NOTE — REASON FOR VISIT
[Follow-Up: _____] : a [unfilled] follow-up visit [FreeTextEntry1] : Ms. Worthington is a very pleasant 78-year-old female with a history of a known left parasellar meningioma diagnosed in 2015 status post stereotactic radiosurgery in 08/2023 by Dr. Lewis who presents with several weeks of difficulty with ambulation and magnetic gait, memory loss, confusion and an episode of urinary incontinence. She denies any positional headaches, nausea or vomiting. Serial MRIs demonstrated a progressive increase in size of her ventricles since 2019. The left temporal horn appears larger than the other ventricles. However, there is, however, all ventricles have enlarged progressively over time and there is some encephalomalacia in the left temporal lobe. There does not appear to be any obstruction in the ventricular system. 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 A left ventriculoperitoneal shunt was planned because the ventricular system was larger of the left side and thus would likely be easier and safe to place the ventricular catheter. After risks and benefits of the surgery were discussed with the patient and family they wished to proceed.   S/P Codman Certas VPS placement on 1/4/24  3/23/24: Per her daughter via email: "MRI looks good, shunt is working no malfunctions. Neurosurgery did decide to lower it to 4. They believe that the infection in chest caused my mom's body to regress. She is being treated for it and still in hospital. They put in for rehab since she is still struggling with walking and balance. She was accepted into a facility, now we're waiting for insurance to approve it. Since the lowering of shunt, my mom is more aware and almost sounds like her normal self."  3/26/24: Per son via telephone: Patient is still inpatient at [facility] in [place]. He reports she is acting more like herself, and he feels like she has improved mentation since the shunt adjustment to 4. Hospital is working on getting her to rehab.  4/5/24- CTH completed in [place] was stable. Shunt remained at 4  Today 8/22/2024- pt reports some left eye blurriness and discomfort and reports intermittent sharp left sided abdominal pain. Otherwise, both patient and son feel like she has improved cognitively and physically.  Certas shunt at 4 Gait steady with walker,  Left eye 20/200

## 2024-09-06 ENCOUNTER — APPOINTMENT (OUTPATIENT)
Dept: NEUROSURGERY | Facility: CLINIC | Age: 79
End: 2024-09-06
Payer: MEDICARE

## 2024-09-06 VITALS
SYSTOLIC BLOOD PRESSURE: 151 MMHG | OXYGEN SATURATION: 96 % | HEIGHT: 61 IN | DIASTOLIC BLOOD PRESSURE: 79 MMHG | HEART RATE: 71 BPM | BODY MASS INDEX: 24.17 KG/M2 | WEIGHT: 128 LBS

## 2024-09-06 DIAGNOSIS — D32.9 BENIGN NEOPLASM OF MENINGES, UNSPECIFIED: ICD-10-CM

## 2024-09-06 PROCEDURE — 99215 OFFICE O/P EST HI 40 MIN: CPT

## 2024-09-09 NOTE — PHYSICAL EXAM
[General Appearance - Alert] : alert [General Appearance - In No Acute Distress] : in no acute distress [Oriented To Time, Place, And Person] : oriented to person, place, and time [Person] : oriented to person [Place] : oriented to place [Time] : oriented to time [Motor Tone] : muscle tone was normal in all four extremities [Sclera] : the sclera and conjunctiva were normal [Outer Ear] : the ears and nose were normal in appearance [Neck Appearance] : the appearance of the neck was normal [] : no respiratory distress [Abnormal Walk] : normal gait [Skin Color & Pigmentation] : normal skin color and pigmentation

## 2024-09-09 NOTE — ASSESSMENT
[FreeTextEntry1] : Impression: Left Parasellar Meningioma s/p SRS 8/2023 Development of Hydrocephalus s/p VPS with Dr. Flores in 1/2024 (Dung Gustafson at 4) Recent Worsening Vision Complaints  I discussed with the patient the location of the meningioma and the pros and cons of treatment versus observation.  Based on her worsening vision, I recommend partial resection of the tumor to decompress the optic nerve.  The risks, benefits and alternatives of surgery were discussed with the patient in detail.  Risks include stroke, brain hemorrhage, any type of disability (facial or extremity weakness, facial or extremity numbness, speech difficulties, blindness) and death. There are also possible complications related to the incisions such as infection, pain, swelling and bleeding.  She would like to proceed.  Plan: MRI Brain w/wo NAVIGATION Protocol for OR Planning Catheter Angiogram/Balloon Test Occlusion with Dr. Marino on 10/21/24 Craniotomy for Resection of Residual Meningioma on 10/22/24 Will Need Medical Clearance with PCP

## 2024-09-09 NOTE — HISTORY OF PRESENT ILLNESS
[de-identified] : Ms. Fuentes is a very pleasant 78-year-old female with a history of a known left parasellar meningioma diagnosed in 2015 status post stereotactic radiosurgery in 08/2023 by Dr. Lewis who presented to Dr. Flores with several weeks of difficulty with ambulation and magnetic gait, memory loss, confusion and an episode of urinary incontinence. Serial MRIs demonstrated a progressive increase in size of her ventricles since 2019, consistent with normal pressure hydrocephalus.  She had a Codman Certas VPS placement on 1/4/24 set at 4.  At their last visit, she c/o left eye blurriness.   CT 8/2024 reveals a left parietal approach shunt catheter with its tip terminating at the septum pellucidum, similar in position compared to prior. Stable ventricular size. Stable size of 2.7 x 3.3 cm left parasellar mass. There is vasogenic edema in the adjacent left temporal lobe. There is a 7 x 6 mm hyperdense left posterior parasagittal vertex extra-axial lesion suggestive of a meningioma, stable compared to MRI dated 11/27/2023.

## 2024-09-09 NOTE — HISTORY OF PRESENT ILLNESS
[de-identified] : Ms. Fuentes is a very pleasant 78-year-old female with a history of a known left parasellar meningioma diagnosed in 2015 status post stereotactic radiosurgery in 08/2023 by Dr. Lewis who presented to Dr. Flores with several weeks of difficulty with ambulation and magnetic gait, memory loss, confusion and an episode of urinary incontinence. Serial MRIs demonstrated a progressive increase in size of her ventricles since 2019, consistent with normal pressure hydrocephalus.  She had a Codman Certas VPS placement on 1/4/24 set at 4.  At their last visit, she c/o left eye blurriness.   CT 8/2024 reveals a left parietal approach shunt catheter with its tip terminating at the septum pellucidum, similar in position compared to prior. Stable ventricular size. Stable size of 2.7 x 3.3 cm left parasellar mass. There is vasogenic edema in the adjacent left temporal lobe. There is a 7 x 6 mm hyperdense left posterior parasagittal vertex extra-axial lesion suggestive of a meningioma, stable compared to MRI dated 11/27/2023.

## 2024-09-09 NOTE — HISTORY OF PRESENT ILLNESS
[de-identified] : Ms. Fuentes is a very pleasant 78-year-old female with a history of a known left parasellar meningioma diagnosed in 2015 status post stereotactic radiosurgery in 08/2023 by Dr. Lewis who presented to Dr. Flores with several weeks of difficulty with ambulation and magnetic gait, memory loss, confusion and an episode of urinary incontinence. Serial MRIs demonstrated a progressive increase in size of her ventricles since 2019, consistent with normal pressure hydrocephalus.  She had a Codman Certas VPS placement on 1/4/24 set at 4.  At their last visit, she c/o left eye blurriness.   CT 8/2024 reveals a left parietal approach shunt catheter with its tip terminating at the septum pellucidum, similar in position compared to prior. Stable ventricular size. Stable size of 2.7 x 3.3 cm left parasellar mass. There is vasogenic edema in the adjacent left temporal lobe. There is a 7 x 6 mm hyperdense left posterior parasagittal vertex extra-axial lesion suggestive of a meningioma, stable compared to MRI dated 11/27/2023.

## 2024-09-30 ENCOUNTER — APPOINTMENT (OUTPATIENT)
Dept: OPHTHALMOLOGY | Facility: CLINIC | Age: 79
End: 2024-09-30
Payer: MEDICARE

## 2024-09-30 ENCOUNTER — NON-APPOINTMENT (OUTPATIENT)
Age: 79
End: 2024-09-30

## 2024-09-30 PROCEDURE — 99214 OFFICE O/P EST MOD 30 MIN: CPT

## 2024-09-30 PROCEDURE — 92083 EXTENDED VISUAL FIELD XM: CPT

## 2024-09-30 PROCEDURE — 99204 OFFICE O/P NEW MOD 45 MIN: CPT

## 2024-11-19 NOTE — ED ADULT NURSE NOTE - NS ED NURSE REPORT GIVEN TO FT
Any chance we can get a photo before I call her? Improved po intake for lunch.  1:1 assist, consumed 100% of boost, 1/2 serving of fruits, 1cup sugar free jello and 120 cc of h20. Turned/repositioned routinely.    LISETH Darby

## 2025-02-25 NOTE — SWALLOW BEDSIDE ASSESSMENT ADULT - H & P REVIEW
72-year-old female with h/o hypertension (HTN), peripheral vascular disease (PVD), chronic lymphedema, sleep apnea, asthma, lap-band surgery, and appendectomy was admitted on 2/24 for worsening leg pain. The patient reports a history of vascular disease and chronic lower extremity pain. She has not followed up with a vascular doctor since her last doctor passed away. The patient describes her LE pain as throbbing and "muscle pain," extending from her groin down to her feet. The pain worsens with sitting and walking. When walking, the pain radiates from her foot upwards to her groin. Typically, she manages her pain with meloxicam, ibuprofen, hot showers, and leg massages, which usually provide relief. However, she reports that her pain worsened over the weekend and became unbearable today, rated as "12/10," with no relief from her usual methods. The pain is worse in her left leg compared to her right, reports pain even when touching her skin. The patient also reports a recent change in the smell of her urine, but denies fevers, chills, dysuria, frequency, diarrhea, cough, shortness of breath (SOB), chest pain, palpitations. In ER she received doxycycline.    #Severe PVD  #Left lower leg pain likely due to ischemia  -no fever or leukocytosis     72-year-old female with h/o hypertension (HTN), peripheral vascular disease (PVD), chronic lymphedema, sleep apnea, asthma, lap-band surgery, and appendectomy was admitted on 2/24 for worsening leg pain. The patient reports a history of vascular disease and chronic lower extremity pain. She has not followed up with a vascular doctor since her last doctor passed away. The patient describes her LE pain as throbbing and "muscle pain," extending from her groin down to her feet. The pain worsens with sitting and walking. When walking, the pain radiates from her foot upwards to her groin. Typically, she manages her pain with meloxicam, ibuprofen, hot showers, and leg massages, which usually provide relief. However, she reports that her pain worsened over the weekend and became unbearable today, rated as "12/10," with no relief from her usual methods. The pain is worse in her left leg compared to her right, reports pain even when touching her skin. The patient also reports a recent change in the smell of her urine, but denies fevers, chills, dysuria, frequency, diarrhea, cough, shortness of breath (SOB), chest pain, palpitations. In ER she received doxycycline.    #Severe PVD  #B/l chronic stasis dermatitis   #Chronic lower legs lymphedema   #Left lower leg pain likely due to ischemia  -no fever or leukocytosis  -doubt cellulitis  -vascular evaluation in progress  -no need for abx therapy at present time  -pain management per medicine  -old chart reviewed to assess prior cultures  -monitor temps  -f/u CBC  -supportive care  2. Other issues:   -care per medicine    d/w medicine team     yes

## 2025-04-23 NOTE — ASSESSMENT
[FreeTextEntry1] : 1. Chronic constipation with abdominal pain, bloating, stable on Miralax.  Colonoscopy in 2015 with internal hemorrhoids.\par 2. Occasional heartburn, without alarm symptoms. \par 3. History of carbon monoxide poisoning , without residual symptoms. \par 4. Status post , breast cyst excision, rhinoplasty. \par 5. Penicillin allergy.\par \par Recs:\par - Recent labs reviewed.\par - High fiber diet and with adequate fluid intake.  Continue Miralax as needed.\par - Prior colonoscopy results reviewed- repeat colonoscopy recommended in 5-10 years.  Patient will plan to repeat colonoscopy at age 77.  Patient was advised to follow up sooner if she develops any new GI symptoms. Detail Level: Detailed

## 2025-06-09 NOTE — PATIENT PROFILE ADULT - DEAF OR HARD OF HEARING?
[facility] at [facility]  [phone]    06/09/25- Patient's wife stated she just got out of the hospital with an infection and noticed patient is pale and weak.  Doesn't feel he can wait another week for a blood transfusion.  Patient added to [facility] today at 1:30 pm, with possible transfusion tomorrow.  Patient's wife verbalized understanding and was appreciative.   no

## (undated) DEVICE — ELCTR BOVIE TIP BLADE INSULATED 2.75" EDGE

## (undated) DEVICE — CODMAN PERFORATOR 14MM (BLUE)

## (undated) DEVICE — INTRO SHEATH INTEGRA PD 61CM

## (undated) DEVICE — ELCTR PEDICLE SCREW PROBE 3MM BALL 1.8MM X 100MM

## (undated) DEVICE — VISITEC 4X4

## (undated) DEVICE — GLV 8.5 PROTEXIS (WHITE)

## (undated) DEVICE — PREP DURAPREP 26CC

## (undated) DEVICE — ELCTR SUBDERMAL NDL 27G X 1/2" WITH TWISTED PAIR

## (undated) DEVICE — SUT SOFSILK 2-0 18" TIES

## (undated) DEVICE — ELCTR SUBDERMAL CORKSCREW NDL 1.2MM

## (undated) DEVICE — BIPOLAR FORCEP STRYKER STANDARD 7" X 0.5MM (YELLOW)

## (undated) DEVICE — SOL IRR POUR NS 0.9% 500ML

## (undated) DEVICE — MEDTRONIC TRACKER BUNDLE NI

## (undated) DEVICE — WOUND IRR SURGIPHOR

## (undated) DEVICE — ELCTR 4-DISC 20MM 49" (RED, BLUE, GREEN, BLACK)

## (undated) DEVICE — DRAPE CAMERA VIDEO 7"X96"

## (undated) DEVICE — VENODYNE/SCD SLEEVE CALF LARGE

## (undated) DEVICE — SUT VICRYL 2-0 18" CP-2 UNDYED (POP-OFF)

## (undated) DEVICE — MEDTRONIC AXIEM STYLET 23CM

## (undated) DEVICE — PREP CHLORAPREP HI-LITE ORANGE 26ML

## (undated) DEVICE — POSITIONER FOAM EGG CRATE ULNAR 2PCS (PINK)

## (undated) DEVICE — STAPLER SKIN VISI-STAT 35 WIDE

## (undated) DEVICE — GOWN TRIMAX LG

## (undated) DEVICE — PACK VP SHUNT

## (undated) DEVICE — DRSG TELFA 3 X 8

## (undated) DEVICE — DRSG XEROFORM 1 X 8"

## (undated) DEVICE — DRAPE TOWEL BLUE 17" X 24"

## (undated) DEVICE — WARMING BLANKET LOWER ADULT

## (undated) DEVICE — SUT VICRYL 3-0 18" X-1 (POP-OFF)

## (undated) DEVICE — ELCTR MONOPOLAR STIMULATOR PROBE FLUSH-TIP

## (undated) DEVICE — MEDTRONIC AXIEM PATIENT TRACKER NON-INVASIVE

## (undated) DEVICE — DRSG STERISTRIPS 0.5 X 4"

## (undated) DEVICE — GLV 7.5 PROTEXIS (WHITE)

## (undated) DEVICE — MEDICATION LABELS W MARKER

## (undated) DEVICE — TUBING BIPOLAR IRRIGATOR AND CORD SET

## (undated) DEVICE — ELCTR SUBDERMAL NDL CLASSIC 1.5M X 59" (6 COLOR)

## (undated) DEVICE — GLV 7.5 PROTEXIS (BLUE)

## (undated) DEVICE — SOLIDIFIER ISOLYZER 2000 CC

## (undated) DEVICE — SUT BIOSYN 4-0 18" P-12

## (undated) DEVICE — MARKING PEN W RULER

## (undated) DEVICE — DRAPE 1/2 SHEET 40X57"

## (undated) DEVICE — GLV 6.5 PROTEXIS (WHITE)

## (undated) DEVICE — SUT CHROMIC 3-0 30" V-20

## (undated) DEVICE — SNAP ON SPHERZ 5 PACK

## (undated) DEVICE — DRAPE LIGHT HANDLE COVER (BLUE)

## (undated) DEVICE — MIDAS REX LEGEND LUBRICANT DIFFUSER CARTRIDGE

## (undated) DEVICE — SOL IRR POUR H2O 250ML

## (undated) DEVICE — DRSG MASTISOL

## (undated) DEVICE — Device

## (undated) DEVICE — GLV 7 PROTEXIS (WHITE)

## (undated) DEVICE — DRAPE 3/4 SHEET W REINFORCEMENT 56X77"

## (undated) DEVICE — MEDTRONIC AXIEM TRACER POINTER

## (undated) DEVICE — DRAPE MAYO STAND 30"

## (undated) DEVICE — DRSG CURITY GAUZE SPONGE 4 X 4" 12-PLY

## (undated) DEVICE — SPECIMEN CONTAINER 100ML

## (undated) DEVICE — DRSG TAPE HYPAFIX 4"

## (undated) DEVICE — GLV 8 PROTEXIS (WHITE)

## (undated) DEVICE — ELCTR BIPOLAR PROBE